# Patient Record
Sex: FEMALE | Race: WHITE | NOT HISPANIC OR LATINO | URBAN - METROPOLITAN AREA
[De-identification: names, ages, dates, MRNs, and addresses within clinical notes are randomized per-mention and may not be internally consistent; named-entity substitution may affect disease eponyms.]

---

## 2018-01-03 ENCOUNTER — INPATIENT (INPATIENT)
Facility: HOSPITAL | Age: 83
LOS: 9 days | Discharge: HOME | DRG: 25 | End: 2018-01-13
Attending: NEUROLOGICAL SURGERY | Admitting: NEUROLOGICAL SURGERY
Payer: COMMERCIAL

## 2018-01-03 VITALS
RESPIRATION RATE: 18 BRPM | HEART RATE: 70 BPM | SYSTOLIC BLOOD PRESSURE: 167 MMHG | OXYGEN SATURATION: 95 % | DIASTOLIC BLOOD PRESSURE: 75 MMHG | TEMPERATURE: 98 F

## 2018-01-03 DIAGNOSIS — I62.00 NONTRAUMATIC SUBDURAL HEMORRHAGE, UNSPECIFIED: ICD-10-CM

## 2018-01-03 LAB
ALBUMIN SERPL ELPH-MCNC: 3.9 G/DL — SIGNIFICANT CHANGE UP (ref 3.3–5)
ALP SERPL-CCNC: 116 U/L — SIGNIFICANT CHANGE UP (ref 40–120)
ALT FLD-CCNC: 10 U/L RC — SIGNIFICANT CHANGE UP (ref 10–45)
ANION GAP SERPL CALC-SCNC: 12 MMOL/L — SIGNIFICANT CHANGE UP (ref 5–17)
APTT BLD: 32.2 SEC — SIGNIFICANT CHANGE UP (ref 27.5–37.4)
AST SERPL-CCNC: 15 U/L — SIGNIFICANT CHANGE UP (ref 10–40)
BASOPHILS # BLD AUTO: 0 K/UL — SIGNIFICANT CHANGE UP (ref 0–0.2)
BASOPHILS NFR BLD AUTO: 0.4 % — SIGNIFICANT CHANGE UP (ref 0–2)
BILIRUB SERPL-MCNC: 0.6 MG/DL — SIGNIFICANT CHANGE UP (ref 0.2–1.2)
BLD GP AB SCN SERPL QL: NEGATIVE — SIGNIFICANT CHANGE UP
BUN SERPL-MCNC: 12 MG/DL — SIGNIFICANT CHANGE UP (ref 7–23)
CALCIUM SERPL-MCNC: 10 MG/DL — SIGNIFICANT CHANGE UP (ref 8.4–10.5)
CHLORIDE SERPL-SCNC: 100 MMOL/L — SIGNIFICANT CHANGE UP (ref 96–108)
CO2 SERPL-SCNC: 26 MMOL/L — SIGNIFICANT CHANGE UP (ref 22–31)
CREAT SERPL-MCNC: 0.75 MG/DL — SIGNIFICANT CHANGE UP (ref 0.5–1.3)
EOSINOPHIL # BLD AUTO: 0.1 K/UL — SIGNIFICANT CHANGE UP (ref 0–0.5)
EOSINOPHIL NFR BLD AUTO: 2.5 % — SIGNIFICANT CHANGE UP (ref 0–6)
GLUCOSE SERPL-MCNC: 228 MG/DL — HIGH (ref 70–99)
HCT VFR BLD CALC: 41.8 % — SIGNIFICANT CHANGE UP (ref 34.5–45)
HGB BLD-MCNC: 14 G/DL — SIGNIFICANT CHANGE UP (ref 11.5–15.5)
INR BLD: 1.05 RATIO — SIGNIFICANT CHANGE UP (ref 0.88–1.16)
LYMPHOCYTES # BLD AUTO: 1.6 K/UL — SIGNIFICANT CHANGE UP (ref 1–3.3)
LYMPHOCYTES # BLD AUTO: 33.8 % — SIGNIFICANT CHANGE UP (ref 13–44)
MCHC RBC-ENTMCNC: 31 PG — SIGNIFICANT CHANGE UP (ref 27–34)
MCHC RBC-ENTMCNC: 33.6 GM/DL — SIGNIFICANT CHANGE UP (ref 32–36)
MCV RBC AUTO: 92.4 FL — SIGNIFICANT CHANGE UP (ref 80–100)
MONOCYTES # BLD AUTO: 0.5 K/UL — SIGNIFICANT CHANGE UP (ref 0–0.9)
MONOCYTES NFR BLD AUTO: 10.6 % — SIGNIFICANT CHANGE UP (ref 2–14)
NEUTROPHILS # BLD AUTO: 2.6 K/UL — SIGNIFICANT CHANGE UP (ref 1.8–7.4)
NEUTROPHILS NFR BLD AUTO: 52.8 % — SIGNIFICANT CHANGE UP (ref 43–77)
PLATELET # BLD AUTO: 178 K/UL — SIGNIFICANT CHANGE UP (ref 150–400)
POTASSIUM SERPL-MCNC: 3.8 MMOL/L — SIGNIFICANT CHANGE UP (ref 3.5–5.3)
POTASSIUM SERPL-SCNC: 3.8 MMOL/L — SIGNIFICANT CHANGE UP (ref 3.5–5.3)
PROT SERPL-MCNC: 8 G/DL — SIGNIFICANT CHANGE UP (ref 6–8.3)
PROTHROM AB SERPL-ACNC: 11.4 SEC — SIGNIFICANT CHANGE UP (ref 9.8–12.7)
RBC # BLD: 4.53 M/UL — SIGNIFICANT CHANGE UP (ref 3.8–5.2)
RBC # FLD: 13.9 % — SIGNIFICANT CHANGE UP (ref 10.3–14.5)
RH IG SCN BLD-IMP: POSITIVE — SIGNIFICANT CHANGE UP
SODIUM SERPL-SCNC: 138 MMOL/L — SIGNIFICANT CHANGE UP (ref 135–145)
WBC # BLD: 4.8 K/UL — SIGNIFICANT CHANGE UP (ref 3.8–10.5)
WBC # FLD AUTO: 4.8 K/UL — SIGNIFICANT CHANGE UP (ref 3.8–10.5)

## 2018-01-03 PROCEDURE — 99285 EMERGENCY DEPT VISIT HI MDM: CPT

## 2018-01-03 PROCEDURE — 71045 X-RAY EXAM CHEST 1 VIEW: CPT | Mod: 26

## 2018-01-03 PROCEDURE — 70450 CT HEAD/BRAIN W/O DYE: CPT | Mod: 26

## 2018-01-03 RX ORDER — LACOSAMIDE 50 MG/1
250 TABLET ORAL EVERY 12 HOURS
Qty: 0 | Refills: 0 | Status: DISCONTINUED | OUTPATIENT
Start: 2018-01-03 | End: 2018-01-03

## 2018-01-03 RX ORDER — DESMOPRESSIN ACETATE 0.1 MG/1
0.2 TABLET ORAL ONCE
Qty: 0 | Refills: 0 | Status: COMPLETED | OUTPATIENT
Start: 2018-01-03 | End: 2018-01-03

## 2018-01-03 RX ORDER — LACOSAMIDE 50 MG/1
250 TABLET ORAL EVERY 12 HOURS
Qty: 0 | Refills: 0 | Status: DISCONTINUED | OUTPATIENT
Start: 2018-01-03 | End: 2018-01-04

## 2018-01-03 RX ORDER — DEXTROSE MONOHYDRATE, SODIUM CHLORIDE, AND POTASSIUM CHLORIDE 50; .745; 4.5 G/1000ML; G/1000ML; G/1000ML
1000 INJECTION, SOLUTION INTRAVENOUS
Qty: 0 | Refills: 0 | Status: DISCONTINUED | OUTPATIENT
Start: 2018-01-03 | End: 2018-01-03

## 2018-01-03 RX ORDER — SENNA PLUS 8.6 MG/1
2 TABLET ORAL AT BEDTIME
Qty: 0 | Refills: 0 | Status: DISCONTINUED | OUTPATIENT
Start: 2018-01-03 | End: 2018-01-04

## 2018-01-03 RX ORDER — INSULIN LISPRO 100/ML
VIAL (ML) SUBCUTANEOUS EVERY 6 HOURS
Qty: 0 | Refills: 0 | Status: DISCONTINUED | OUTPATIENT
Start: 2018-01-03 | End: 2018-01-04

## 2018-01-03 RX ORDER — DOCUSATE SODIUM 100 MG
100 CAPSULE ORAL DAILY
Qty: 0 | Refills: 0 | Status: DISCONTINUED | OUTPATIENT
Start: 2018-01-03 | End: 2018-01-04

## 2018-01-03 RX ADMIN — DESMOPRESSIN ACETATE 0.2 MILLIGRAM(S): 0.1 TABLET ORAL at 22:21

## 2018-01-03 NOTE — ED PROVIDER NOTE - NS ED ROS FT
No fever, no chills, no change in vision, no chest pain, no SOB, no cough, no abdominal pain, no nausea, no vomiting, no joint pain, no rashes, bl le weakness, no other focal neurologic complaints, no psychiatric issues, otherwise as HPI or negative

## 2018-01-03 NOTE — ED PROVIDER NOTE - PHYSICAL EXAMINATION
NAD, NCAT, MMM, Trachea midline, No midline spine  ttp throughout.  PERRL b/l, CTAB, Non-tachy, Normal perfusion, soft, NTND, No edema, No deformity of extremities,  CN grossly intact,, No focal motor or sensory deficits.

## 2018-01-03 NOTE — H&P ADULT - PMH
Atrial fibrillation  on aspirin (last taken 1/2/2018)  Cervical cancer  Stage I per the daughter s/p hysterectomy april 2000 (complicated by: adhesions / pna)  Diabetes mellitus    Endocarditis  2010 (treated w/ antibiotics in Q)  Hypertension

## 2018-01-03 NOTE — ED PROVIDER NOTE - OBJECTIVE STATEMENT
89F hx afib on asa and metoprolol transferred from [facility] for SDH right sided 2cm with 1.2cm rightward shift and mass effect.  Neuro and HD stable.  Pt initially c/b bl le weakness.  CTH and C-spine performed with above findings.

## 2018-01-03 NOTE — H&P ADULT - NSHPPHYSICALEXAM_GEN_ALL_CORE
Neuro: Awake, alert, oriented to self, place and month, pupils 2mm reactive bilaterally, following commands, moving all extremities x 4, no drift, lower extermities 4/5 secondary to effort  Cardiac: RRR on telemetry  Respiratory: No wheezing, no rhonchi  GI: abd soft, non-tender, mildly distended, positive bowel sounds  Skin: Intact Neuro: Awake, alert, oriented to self, place and month, pupils 2mm reactive bilaterally (rt pupil is surgical), following commands, moving all extremities x 4, no drift, upper/lower extermities 4/5 secondary to effort  Cardiac: RRR on telemetry  Respiratory: No wheezing, no rhonchi  GI: abd soft, non-tender, mildly distended, positive bowel sounds  Skin: Intact

## 2018-01-03 NOTE — ED PROVIDER NOTE - ATTENDING CONTRIBUTION TO CARE
89F hx afib on asa and metoprolol transferred from [facility] for SDH rigth sided 2cm with 1.2cm rightward shift and mass effect.  Neuro and HD stable.  Pt initially c/b bl le weakness. 89F hx afib on asa and metoprolol transferred from [facility] for SDH right sided 2cm with 1.2cm rightward shift and mass effect.  Neuro and HD stable.  Pt initially c/b bl le weakness.  CTH and C-spine performed with above findings.  VSS.  Primary survey intact.  GCS 15  (Attending - Adele) NAD, NCAT, MMM, Trachea midline, No midline cervical tpp throughout.  PERRL b/l, CTAB, Non-tachy, Normal perfusion, soft, NTND, No edema, No deformity of extremities,  CN grossly intact,, No focal motor or sensory deficits.     NSx consulted, wll give DDAVP for asa use, CXR, repeat CTH, ekg, reassess 89F hx afib on asa and metoprolol transferred from [facility] for SDH right sided 2cm with 1.2cm rightward shift and mass effect.  Neuro and HD stable.  Pt initially c/b bl le weakness.  CTH and C-spine performed with above findings.  VSS.  Primary survey intact.  GCS 15  (Attending - Adele) NAD, NCAT, MMM, Trachea midline, No midline cervical ttp throughout.  PERRL b/l, CTAB, Non-tachy, Normal perfusion, soft, NTND, No edema, No deformity of extremities,  CN grossly intact,, No focal motor or sensory deficits.     NSx consulted, wll give DDAVP for asa use, CXR, repeat CTH, ekg, reassess

## 2018-01-03 NOTE — ED PROVIDER NOTE - MEDICAL DECISION MAKING DETAILS
89F hx afib on asa and metoprolol transferred from [facility] for SDH right sided 2cm with 1.2cm rightward shift and mass effect.  Neuro and HD stable.  Pt initially c/b bl le weakness.  CTH and C-spine performed with above findings.  VSS.  Primary survey intact.  GCS 15  (Attending - Adele) NAD, NCAT, MMM, Trachea midline, No midline cervical tpp throughout.  PERRL b/l, CTAB, Non-tachy, Normal perfusion, soft, NTND, No edema, No deformity of extremities,  CN grossly intact,, No focal motor or sensory deficits.     NSx consulted, wll give DDAVP for asa use, CXR, repeat CTH, ekg, reassess
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
## 2018-01-03 NOTE — H&P ADULT - PSH
H/O: hysterectomy    History of tracheostomy  april 2000 as a complication from her hysterectomy - developed pna and was unable to be weaned off the vent

## 2018-01-03 NOTE — H&P ADULT - PROBLEM SELECTOR PLAN 1
1. CT Head stat  2. Admit to NSCU  3. DDAVP / Platelets  4. Keep NPO for pre-op for burrhole in am  5. Pre-op labs  6. Anti-epileptics

## 2018-01-04 DIAGNOSIS — Z90.710 ACQUIRED ABSENCE OF BOTH CERVIX AND UTERUS: Chronic | ICD-10-CM

## 2018-01-04 DIAGNOSIS — Z98.890 OTHER SPECIFIED POSTPROCEDURAL STATES: Chronic | ICD-10-CM

## 2018-01-04 DIAGNOSIS — I62.00 NONTRAUMATIC SUBDURAL HEMORRHAGE, UNSPECIFIED: ICD-10-CM

## 2018-01-04 LAB
ANION GAP SERPL CALC-SCNC: 12 MMOL/L — SIGNIFICANT CHANGE UP (ref 5–17)
ANION GAP SERPL CALC-SCNC: 14 MMOL/L — SIGNIFICANT CHANGE UP (ref 5–17)
BUN SERPL-MCNC: 10 MG/DL — SIGNIFICANT CHANGE UP (ref 7–23)
BUN SERPL-MCNC: 11 MG/DL — SIGNIFICANT CHANGE UP (ref 7–23)
CALCIUM SERPL-MCNC: 9 MG/DL — SIGNIFICANT CHANGE UP (ref 8.4–10.5)
CALCIUM SERPL-MCNC: 9.9 MG/DL — SIGNIFICANT CHANGE UP (ref 8.4–10.5)
CHLORIDE SERPL-SCNC: 102 MMOL/L — SIGNIFICANT CHANGE UP (ref 96–108)
CHLORIDE SERPL-SCNC: 107 MMOL/L — SIGNIFICANT CHANGE UP (ref 96–108)
CHOLEST SERPL-MCNC: 126 MG/DL — SIGNIFICANT CHANGE UP (ref 10–199)
CK MB CFR SERPL CALC: 2.4 NG/ML — SIGNIFICANT CHANGE UP (ref 0–3.8)
CK SERPL-CCNC: 60 U/L — SIGNIFICANT CHANGE UP (ref 25–170)
CO2 SERPL-SCNC: 24 MMOL/L — SIGNIFICANT CHANGE UP (ref 22–31)
CO2 SERPL-SCNC: 25 MMOL/L — SIGNIFICANT CHANGE UP (ref 22–31)
CREAT SERPL-MCNC: 0.62 MG/DL — SIGNIFICANT CHANGE UP (ref 0.5–1.3)
CREAT SERPL-MCNC: 0.87 MG/DL — SIGNIFICANT CHANGE UP (ref 0.5–1.3)
GLUCOSE BLDC GLUCOMTR-MCNC: 195 MG/DL — HIGH (ref 70–99)
GLUCOSE BLDC GLUCOMTR-MCNC: 210 MG/DL — HIGH (ref 70–99)
GLUCOSE BLDC GLUCOMTR-MCNC: 215 MG/DL — HIGH (ref 70–99)
GLUCOSE BLDC GLUCOMTR-MCNC: 87 MG/DL — SIGNIFICANT CHANGE UP (ref 70–99)
GLUCOSE SERPL-MCNC: 231 MG/DL — HIGH (ref 70–99)
GLUCOSE SERPL-MCNC: 95 MG/DL — SIGNIFICANT CHANGE UP (ref 70–99)
HBA1C BLD-MCNC: 6.2 % — HIGH (ref 4–5.6)
HCT VFR BLD CALC: 38.2 % — SIGNIFICANT CHANGE UP (ref 34.5–45)
HCT VFR BLD CALC: 39 % — SIGNIFICANT CHANGE UP (ref 34.5–45)
HDLC SERPL-MCNC: 68 MG/DL — SIGNIFICANT CHANGE UP (ref 40–125)
HGB BLD-MCNC: 12.9 G/DL — SIGNIFICANT CHANGE UP (ref 11.5–15.5)
HGB BLD-MCNC: 14.1 G/DL — SIGNIFICANT CHANGE UP (ref 11.5–15.5)
LIPID PNL WITH DIRECT LDL SERPL: 45 MG/DL — SIGNIFICANT CHANGE UP
MAGNESIUM SERPL-MCNC: 2 MG/DL — SIGNIFICANT CHANGE UP (ref 1.6–2.6)
MAGNESIUM SERPL-MCNC: 2.1 MG/DL — SIGNIFICANT CHANGE UP (ref 1.6–2.6)
MCHC RBC-ENTMCNC: 31.4 PG — SIGNIFICANT CHANGE UP (ref 27–34)
MCHC RBC-ENTMCNC: 33.5 PG — SIGNIFICANT CHANGE UP (ref 27–34)
MCHC RBC-ENTMCNC: 33.8 GM/DL — SIGNIFICANT CHANGE UP (ref 32–36)
MCHC RBC-ENTMCNC: 36.2 GM/DL — HIGH (ref 32–36)
MCV RBC AUTO: 92.6 FL — SIGNIFICANT CHANGE UP (ref 80–100)
MCV RBC AUTO: 92.8 FL — SIGNIFICANT CHANGE UP (ref 80–100)
PHOSPHATE SERPL-MCNC: 2.4 MG/DL — LOW (ref 2.5–4.5)
PHOSPHATE SERPL-MCNC: 2.8 MG/DL — SIGNIFICANT CHANGE UP (ref 2.5–4.5)
PLATELET # BLD AUTO: 171 K/UL — SIGNIFICANT CHANGE UP (ref 150–400)
PLATELET # BLD AUTO: 192 K/UL — SIGNIFICANT CHANGE UP (ref 150–400)
POTASSIUM SERPL-MCNC: 3.2 MMOL/L — LOW (ref 3.5–5.3)
POTASSIUM SERPL-MCNC: 3.7 MMOL/L — SIGNIFICANT CHANGE UP (ref 3.5–5.3)
POTASSIUM SERPL-SCNC: 3.2 MMOL/L — LOW (ref 3.5–5.3)
POTASSIUM SERPL-SCNC: 3.7 MMOL/L — SIGNIFICANT CHANGE UP (ref 3.5–5.3)
RBC # BLD: 4.12 M/UL — SIGNIFICANT CHANGE UP (ref 3.8–5.2)
RBC # BLD: 4.21 M/UL — SIGNIFICANT CHANGE UP (ref 3.8–5.2)
RBC # FLD: 13.6 % — SIGNIFICANT CHANGE UP (ref 10.3–14.5)
RBC # FLD: 14 % — SIGNIFICANT CHANGE UP (ref 10.3–14.5)
RH IG SCN BLD-IMP: POSITIVE — SIGNIFICANT CHANGE UP
SODIUM SERPL-SCNC: 140 MMOL/L — SIGNIFICANT CHANGE UP (ref 135–145)
SODIUM SERPL-SCNC: 144 MMOL/L — SIGNIFICANT CHANGE UP (ref 135–145)
TOTAL CHOLESTEROL/HDL RATIO MEASUREMENT: 1.9 RATIO — LOW (ref 3.3–7.1)
TRIGL SERPL-MCNC: 64 MG/DL — SIGNIFICANT CHANGE UP (ref 10–149)
TROPONIN T SERPL-MCNC: <0.01 NG/ML — SIGNIFICANT CHANGE UP (ref 0–0.06)
WBC # BLD: 4.4 K/UL — SIGNIFICANT CHANGE UP (ref 3.8–10.5)
WBC # BLD: 9.3 K/UL — SIGNIFICANT CHANGE UP (ref 3.8–10.5)
WBC # FLD AUTO: 4.4 K/UL — SIGNIFICANT CHANGE UP (ref 3.8–10.5)
WBC # FLD AUTO: 9.3 K/UL — SIGNIFICANT CHANGE UP (ref 3.8–10.5)

## 2018-01-04 PROCEDURE — 61312 CRNEC/CRNOT STTL XDRL/SDRL: CPT

## 2018-01-04 PROCEDURE — 99291 CRITICAL CARE FIRST HOUR: CPT

## 2018-01-04 PROCEDURE — 93010 ELECTROCARDIOGRAM REPORT: CPT | Mod: 76

## 2018-01-04 PROCEDURE — 99292 CRITICAL CARE ADDL 30 MIN: CPT

## 2018-01-04 RX ORDER — INSULIN LISPRO 100/ML
VIAL (ML) SUBCUTANEOUS EVERY 6 HOURS
Qty: 0 | Refills: 0 | Status: DISCONTINUED | OUTPATIENT
Start: 2018-01-04 | End: 2018-01-04

## 2018-01-04 RX ORDER — SENNA PLUS 8.6 MG/1
2 TABLET ORAL AT BEDTIME
Qty: 0 | Refills: 0 | Status: DISCONTINUED | OUTPATIENT
Start: 2018-01-04 | End: 2018-01-13

## 2018-01-04 RX ORDER — PIPERACILLIN AND TAZOBACTAM 4; .5 G/20ML; G/20ML
3.38 INJECTION, POWDER, LYOPHILIZED, FOR SOLUTION INTRAVENOUS EVERY 8 HOURS
Qty: 0 | Refills: 0 | Status: DISCONTINUED | OUTPATIENT
Start: 2018-01-04 | End: 2018-01-05

## 2018-01-04 RX ORDER — PIPERACILLIN AND TAZOBACTAM 4; .5 G/20ML; G/20ML
3.38 INJECTION, POWDER, LYOPHILIZED, FOR SOLUTION INTRAVENOUS EVERY 8 HOURS
Qty: 0 | Refills: 0 | Status: DISCONTINUED | OUTPATIENT
Start: 2018-01-04 | End: 2018-01-04

## 2018-01-04 RX ORDER — METOPROLOL TARTRATE 50 MG
50 TABLET ORAL DAILY
Qty: 0 | Refills: 0 | Status: DISCONTINUED | OUTPATIENT
Start: 2018-01-04 | End: 2018-01-04

## 2018-01-04 RX ORDER — DOCUSATE SODIUM 100 MG
100 CAPSULE ORAL DAILY
Qty: 0 | Refills: 0 | Status: DISCONTINUED | OUTPATIENT
Start: 2018-01-04 | End: 2018-01-13

## 2018-01-04 RX ORDER — VANCOMYCIN HCL 1 G
500 VIAL (EA) INTRAVENOUS EVERY 12 HOURS
Qty: 0 | Refills: 0 | Status: DISCONTINUED | OUTPATIENT
Start: 2018-01-04 | End: 2018-01-04

## 2018-01-04 RX ORDER — AMLODIPINE BESYLATE 2.5 MG/1
10 TABLET ORAL DAILY
Qty: 0 | Refills: 0 | Status: DISCONTINUED | OUTPATIENT
Start: 2018-01-04 | End: 2018-01-04

## 2018-01-04 RX ORDER — PANTOPRAZOLE SODIUM 20 MG/1
40 TABLET, DELAYED RELEASE ORAL
Qty: 0 | Refills: 0 | Status: DISCONTINUED | OUTPATIENT
Start: 2018-01-04 | End: 2018-01-09

## 2018-01-04 RX ORDER — LABETALOL HCL 100 MG
10 TABLET ORAL ONCE
Qty: 0 | Refills: 0 | Status: COMPLETED | OUTPATIENT
Start: 2018-01-04 | End: 2018-01-04

## 2018-01-04 RX ORDER — HYDRALAZINE HCL 50 MG
5 TABLET ORAL ONCE
Qty: 0 | Refills: 0 | Status: COMPLETED | OUTPATIENT
Start: 2018-01-04 | End: 2018-01-04

## 2018-01-04 RX ORDER — INSULIN LISPRO 100/ML
VIAL (ML) SUBCUTANEOUS
Qty: 0 | Refills: 0 | Status: DISCONTINUED | OUTPATIENT
Start: 2018-01-04 | End: 2018-01-13

## 2018-01-04 RX ORDER — ACETAMINOPHEN 500 MG
1000 TABLET ORAL ONCE
Qty: 0 | Refills: 0 | Status: COMPLETED | OUTPATIENT
Start: 2018-01-04 | End: 2018-01-04

## 2018-01-04 RX ORDER — FUROSEMIDE 40 MG
10 TABLET ORAL ONCE
Qty: 0 | Refills: 0 | Status: COMPLETED | OUTPATIENT
Start: 2018-01-04 | End: 2018-01-04

## 2018-01-04 RX ORDER — FUROSEMIDE 40 MG
40 TABLET ORAL
Qty: 0 | Refills: 0 | Status: DISCONTINUED | OUTPATIENT
Start: 2018-01-04 | End: 2018-01-04

## 2018-01-04 RX ORDER — HYDRALAZINE HCL 50 MG
10 TABLET ORAL ONCE
Qty: 0 | Refills: 0 | Status: COMPLETED | OUTPATIENT
Start: 2018-01-04 | End: 2018-01-04

## 2018-01-04 RX ORDER — POTASSIUM CHLORIDE 20 MEQ
10 PACKET (EA) ORAL
Qty: 0 | Refills: 0 | Status: COMPLETED | OUTPATIENT
Start: 2018-01-04 | End: 2018-01-05

## 2018-01-04 RX ORDER — SODIUM CHLORIDE 9 MG/ML
1000 INJECTION INTRAMUSCULAR; INTRAVENOUS; SUBCUTANEOUS
Qty: 0 | Refills: 0 | Status: DISCONTINUED | OUTPATIENT
Start: 2018-01-04 | End: 2018-01-04

## 2018-01-04 RX ORDER — METOPROLOL TARTRATE 50 MG
50 TABLET ORAL DAILY
Qty: 0 | Refills: 0 | Status: DISCONTINUED | OUTPATIENT
Start: 2018-01-04 | End: 2018-01-13

## 2018-01-04 RX ORDER — FUROSEMIDE 40 MG
40 TABLET ORAL
Qty: 0 | Refills: 0 | Status: DISCONTINUED | OUTPATIENT
Start: 2018-01-04 | End: 2018-01-05

## 2018-01-04 RX ORDER — PIPERACILLIN AND TAZOBACTAM 4; .5 G/20ML; G/20ML
3.38 INJECTION, POWDER, LYOPHILIZED, FOR SOLUTION INTRAVENOUS EVERY 12 HOURS
Qty: 0 | Refills: 0 | Status: DISCONTINUED | OUTPATIENT
Start: 2018-01-04 | End: 2018-01-04

## 2018-01-04 RX ORDER — VANCOMYCIN HCL 1 G
500 VIAL (EA) INTRAVENOUS EVERY 12 HOURS
Qty: 0 | Refills: 0 | Status: DISCONTINUED | OUTPATIENT
Start: 2018-01-04 | End: 2018-01-05

## 2018-01-04 RX ORDER — AMLODIPINE BESYLATE 2.5 MG/1
10 TABLET ORAL DAILY
Qty: 0 | Refills: 0 | Status: DISCONTINUED | OUTPATIENT
Start: 2018-01-04 | End: 2018-01-13

## 2018-01-04 RX ORDER — LOSARTAN POTASSIUM 100 MG/1
100 TABLET, FILM COATED ORAL DAILY
Qty: 0 | Refills: 0 | Status: DISCONTINUED | OUTPATIENT
Start: 2018-01-04 | End: 2018-01-13

## 2018-01-04 RX ORDER — LEVETIRACETAM 250 MG/1
500 TABLET, FILM COATED ORAL EVERY 12 HOURS
Qty: 0 | Refills: 0 | Status: DISCONTINUED | OUTPATIENT
Start: 2018-01-04 | End: 2018-01-05

## 2018-01-04 RX ORDER — LOSARTAN POTASSIUM 100 MG/1
100 TABLET, FILM COATED ORAL DAILY
Qty: 0 | Refills: 0 | Status: DISCONTINUED | OUTPATIENT
Start: 2018-01-04 | End: 2018-01-04

## 2018-01-04 RX ORDER — POTASSIUM PHOSPHATE, MONOBASIC POTASSIUM PHOSPHATE, DIBASIC 236; 224 MG/ML; MG/ML
15 INJECTION, SOLUTION INTRAVENOUS ONCE
Qty: 0 | Refills: 0 | Status: COMPLETED | OUTPATIENT
Start: 2018-01-04 | End: 2018-01-04

## 2018-01-04 RX ADMIN — PIPERACILLIN AND TAZOBACTAM 25 GRAM(S): 4; .5 INJECTION, POWDER, LYOPHILIZED, FOR SOLUTION INTRAVENOUS at 13:45

## 2018-01-04 RX ADMIN — Medication 400 MILLIGRAM(S): at 23:00

## 2018-01-04 RX ADMIN — POTASSIUM PHOSPHATE, MONOBASIC POTASSIUM PHOSPHATE, DIBASIC 62.5 MILLIMOLE(S): 236; 224 INJECTION, SOLUTION INTRAVENOUS at 02:17

## 2018-01-04 RX ADMIN — Medication 2: at 18:12

## 2018-01-04 RX ADMIN — Medication 10 MILLIGRAM(S): at 02:21

## 2018-01-04 RX ADMIN — PIPERACILLIN AND TAZOBACTAM 25 GRAM(S): 4; .5 INJECTION, POWDER, LYOPHILIZED, FOR SOLUTION INTRAVENOUS at 23:41

## 2018-01-04 RX ADMIN — Medication 40 MILLIGRAM(S): at 18:17

## 2018-01-04 RX ADMIN — SODIUM CHLORIDE 50 MILLILITER(S): 9 INJECTION INTRAMUSCULAR; INTRAVENOUS; SUBCUTANEOUS at 18:18

## 2018-01-04 RX ADMIN — Medication 4: at 00:49

## 2018-01-04 RX ADMIN — Medication 10 MILLIGRAM(S): at 13:43

## 2018-01-04 RX ADMIN — LOSARTAN POTASSIUM 100 MILLIGRAM(S): 100 TABLET, FILM COATED ORAL at 18:17

## 2018-01-04 RX ADMIN — Medication 100 MILLIGRAM(S): at 22:12

## 2018-01-04 RX ADMIN — Medication 10 MILLIGRAM(S): at 15:00

## 2018-01-04 RX ADMIN — LEVETIRACETAM 400 MILLIGRAM(S): 250 TABLET, FILM COATED ORAL at 17:47

## 2018-01-04 RX ADMIN — Medication 4: at 05:25

## 2018-01-04 RX ADMIN — Medication 5 MILLIGRAM(S): at 03:23

## 2018-01-04 RX ADMIN — PANTOPRAZOLE SODIUM 40 MILLIGRAM(S): 20 TABLET, DELAYED RELEASE ORAL at 17:47

## 2018-01-04 RX ADMIN — Medication 100 MILLIGRAM(S): at 17:46

## 2018-01-04 RX ADMIN — Medication 100 MILLIEQUIVALENT(S): at 23:30

## 2018-01-04 RX ADMIN — SENNA PLUS 2 TABLET(S): 8.6 TABLET ORAL at 22:12

## 2018-01-04 RX ADMIN — LACOSAMIDE 150 MILLIGRAM(S): 50 TABLET ORAL at 05:22

## 2018-01-04 NOTE — PROGRESS NOTE ADULT - ASSESSMENT
ASSESSMENT/PLAN:  Pt with h/o afb on ASA now with acute on subacute left SDH.   Neuro checks q 1hr. CT in am   VPA for sz prophylaxis  DDAVP/platelets  NPO for OR in am      [x] Patient is at high risk of neurologic deterioration/death due to: SDH, brain compression    Time seen:  Time spent: _35__ [x] critical care minutes

## 2018-01-04 NOTE — BRIEF OPERATIVE NOTE - CONDITION POST OP
----- Message from Marilyn K Vermeesch, MD sent at 12/22/2017  7:48 AM EST -----  Please call pt with labs.  Cholesterol panel is elevated, but so is HDL and this protects her heart. Her calcium level is high and I have ordered special labs that she needs to return for, cannot repeat on same blood.  Thyroid is in good range.  All   other labs good.  
Pt notified.  
guarded
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
## 2018-01-04 NOTE — PROGRESS NOTE ADULT - SUBJECTIVE AND OBJECTIVE BOX
Neurosurgery Postop  Patient seen and examined  Awake, alert, oriented to self    Follows commands  Left pupil reactive right surgical  BERGER, effort limited, with RUE at least 4/5   Drain holding suction     -Will continue subdural drain  -CTH in AM   -Trend H/H given coffee ground emesis in the OR
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
## 2018-01-04 NOTE — PROGRESS NOTE ADULT - SUBJECTIVE AND OBJECTIVE BOX
NSCU ATTENDING -- ADDITIONAL PROGRESS NOTE    Nighttime rounds were performed -- please refer to earlier Progress Note for HPI details.    T(C): 36.8 (01-04-18 @ 19:00), Max: 36.9 (01-04-18 @ 04:00)  HR: 86 (01-04-18 @ 22:00) (68 - 86)  BP: 134/57 (01-04-18 @ 22:00) (95/77 - 182/82)  RR: 22 (01-04-18 @ 22:00) (13 - 30)  SpO2: 100% (01-04-18 @ 22:00) (95% - 100%)  Wt(kg): --    Relevant labwork and imaging reviewed.    Patient remains critically ill.    SD drain in, post op day 0 from SDH evacaution.  Hypokalemic 3.2, potassium ordered.  Post-op H/H slightly down but unremarkable otherwise.  CT brain in AM, b/l duplex ordered due to history of malignancy.      Additional 30 minutes of critical care time.

## 2018-01-04 NOTE — BRIEF OPERATIVE NOTE - PROCEDURE
<<-----Click on this checkbox to enter Procedure Craniotomy and evacuation of blood clot  01/04/2018    Active  [id number]
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
## 2018-01-04 NOTE — PATIENT PROFILE ADULT. - TEACHING/LEARNING LEARNING PREFERENCES
skill demonstration/written material/verbal instruction/group instruction/audio/individual instruction
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
## 2018-01-04 NOTE — PROGRESS NOTE ADULT - ASSESSMENT
Summary:     NEURO:  q1h neuro checks  Vimpat for seizure ppx  CT this AM    CARDS:  Afib rate controlled, HTN on home meds  Goal -150  Hold Aspirin    PULM:  sat > 92%    RENAL:  continue lasix 40mg bid; goal net negative    GASTRO:  NPO for OR today.    HEME:  monitor H/H    Follow-up DVT US  DVT prophylaxis: SCDs, hold anticoagulation    ENDO:  blood glucose trending high  ISS, monitor fsg    ID:  afebrile    Code status:  Full code  Disposition:  ICU    This patient was at high risk of neurologic deterioration and/or death due to: herniation    Time spent:  45 minutes Summary:     NEURO: acute on chronic SDH  pre-op for denia hole this morning  Vimpat for seizure ppx      CARDS:  Afib rate controlled, HTN on home meds  Goal -150  Hold Aspirin    PULM:  sat > 92%    RENAL:  continue lasix 40mg bid; goal net negative    GASTRO:  NPO for OR today.    HEME:  monitor H/H    Follow-up DVT US  DVT prophylaxis: SCDs, hold anticoagulation    ENDO:  blood glucose trending high  ISS, monitor fsg    ID:  afebrile    Code status:  Full code  Disposition:  ICU    This patient was at high risk of neurologic deterioration and/or death due to: herniation    Time spent:  45 minutes
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
## 2018-01-05 LAB
GLUCOSE BLDC GLUCOMTR-MCNC: 164 MG/DL — HIGH (ref 70–99)
GLUCOSE BLDC GLUCOMTR-MCNC: 180 MG/DL — HIGH (ref 70–99)
GLUCOSE BLDC GLUCOMTR-MCNC: 243 MG/DL — HIGH (ref 70–99)
GLUCOSE BLDC GLUCOMTR-MCNC: 265 MG/DL — HIGH (ref 70–99)

## 2018-01-05 PROCEDURE — 93306 TTE W/DOPPLER COMPLETE: CPT | Mod: 26

## 2018-01-05 PROCEDURE — 71045 X-RAY EXAM CHEST 1 VIEW: CPT | Mod: 26

## 2018-01-05 PROCEDURE — 70450 CT HEAD/BRAIN W/O DYE: CPT | Mod: 26

## 2018-01-05 PROCEDURE — 99291 CRITICAL CARE FIRST HOUR: CPT

## 2018-01-05 RX ORDER — SODIUM CHLORIDE 9 MG/ML
1000 INJECTION, SOLUTION INTRAVENOUS
Qty: 0 | Refills: 0 | Status: DISCONTINUED | OUTPATIENT
Start: 2018-01-05 | End: 2018-01-13

## 2018-01-05 RX ORDER — INSULIN NPH HUM/REG INSULIN HM 70-30/ML
6 VIAL (ML) SUBCUTANEOUS
Qty: 0 | Refills: 0 | COMMUNITY

## 2018-01-05 RX ORDER — ENOXAPARIN SODIUM 100 MG/ML
40 INJECTION SUBCUTANEOUS
Qty: 0 | Refills: 0 | Status: DISCONTINUED | OUTPATIENT
Start: 2018-01-05 | End: 2018-01-10

## 2018-01-05 RX ORDER — DEXTROSE 50 % IN WATER 50 %
12.5 SYRINGE (ML) INTRAVENOUS ONCE
Qty: 0 | Refills: 0 | Status: DISCONTINUED | OUTPATIENT
Start: 2018-01-05 | End: 2018-01-13

## 2018-01-05 RX ORDER — GLUCAGON INJECTION, SOLUTION 0.5 MG/.1ML
1 INJECTION, SOLUTION SUBCUTANEOUS ONCE
Qty: 0 | Refills: 0 | Status: DISCONTINUED | OUTPATIENT
Start: 2018-01-05 | End: 2018-01-13

## 2018-01-05 RX ORDER — FUROSEMIDE 40 MG
10 TABLET ORAL ONCE
Qty: 0 | Refills: 0 | Status: COMPLETED | OUTPATIENT
Start: 2018-01-05 | End: 2018-01-05

## 2018-01-05 RX ORDER — AMLODIPINE BESYLATE 2.5 MG/1
40 TABLET ORAL
Qty: 0 | Refills: 0 | COMMUNITY

## 2018-01-05 RX ORDER — LACOSAMIDE 50 MG/1
100 TABLET ORAL
Qty: 0 | Refills: 0 | Status: DISCONTINUED | OUTPATIENT
Start: 2018-01-05 | End: 2018-01-07

## 2018-01-05 RX ORDER — FUROSEMIDE 40 MG
20 TABLET ORAL EVERY 12 HOURS
Qty: 0 | Refills: 0 | Status: DISCONTINUED | OUTPATIENT
Start: 2018-01-05 | End: 2018-01-13

## 2018-01-05 RX ORDER — LEVETIRACETAM 250 MG/1
500 TABLET, FILM COATED ORAL
Qty: 0 | Refills: 0 | Status: DISCONTINUED | OUTPATIENT
Start: 2018-01-05 | End: 2018-01-05

## 2018-01-05 RX ORDER — INSULIN GLARGINE 100 [IU]/ML
5 INJECTION, SOLUTION SUBCUTANEOUS AT BEDTIME
Qty: 0 | Refills: 0 | Status: DISCONTINUED | OUTPATIENT
Start: 2018-01-05 | End: 2018-01-06

## 2018-01-05 RX ORDER — INSULIN NPH HUM/REG INSULIN HM 70-30/ML
30 VIAL (ML) SUBCUTANEOUS
Qty: 0 | Refills: 0 | COMMUNITY

## 2018-01-05 RX ORDER — DEXTROSE 50 % IN WATER 50 %
25 SYRINGE (ML) INTRAVENOUS ONCE
Qty: 0 | Refills: 0 | Status: DISCONTINUED | OUTPATIENT
Start: 2018-01-05 | End: 2018-01-13

## 2018-01-05 RX ORDER — DEXTROSE 50 % IN WATER 50 %
1 SYRINGE (ML) INTRAVENOUS ONCE
Qty: 0 | Refills: 0 | Status: DISCONTINUED | OUTPATIENT
Start: 2018-01-05 | End: 2018-01-13

## 2018-01-05 RX ADMIN — LEVETIRACETAM 400 MILLIGRAM(S): 250 TABLET, FILM COATED ORAL at 05:46

## 2018-01-05 RX ADMIN — Medication 100 MILLIEQUIVALENT(S): at 00:40

## 2018-01-05 RX ADMIN — LEVETIRACETAM 500 MILLIGRAM(S): 250 TABLET, FILM COATED ORAL at 18:30

## 2018-01-05 RX ADMIN — LOSARTAN POTASSIUM 100 MILLIGRAM(S): 100 TABLET, FILM COATED ORAL at 05:50

## 2018-01-05 RX ADMIN — PANTOPRAZOLE SODIUM 40 MILLIGRAM(S): 20 TABLET, DELAYED RELEASE ORAL at 17:34

## 2018-01-05 RX ADMIN — Medication 100 MILLIGRAM(S): at 12:14

## 2018-01-05 RX ADMIN — Medication 40 MILLIGRAM(S): at 05:50

## 2018-01-05 RX ADMIN — Medication 2: at 17:34

## 2018-01-05 RX ADMIN — Medication 10 MILLIGRAM(S): at 03:25

## 2018-01-05 RX ADMIN — ENOXAPARIN SODIUM 40 MILLIGRAM(S): 100 INJECTION SUBCUTANEOUS at 17:33

## 2018-01-05 RX ADMIN — Medication 20 MILLIGRAM(S): at 17:34

## 2018-01-05 RX ADMIN — Medication 2: at 22:13

## 2018-01-05 RX ADMIN — PANTOPRAZOLE SODIUM 40 MILLIGRAM(S): 20 TABLET, DELAYED RELEASE ORAL at 06:40

## 2018-01-05 RX ADMIN — AMLODIPINE BESYLATE 10 MILLIGRAM(S): 2.5 TABLET ORAL at 05:50

## 2018-01-05 RX ADMIN — Medication 50 MILLIGRAM(S): at 05:50

## 2018-01-05 RX ADMIN — Medication 6: at 12:14

## 2018-01-05 RX ADMIN — Medication 4: at 09:25

## 2018-01-05 RX ADMIN — PIPERACILLIN AND TAZOBACTAM 25 GRAM(S): 4; .5 INJECTION, POWDER, LYOPHILIZED, FOR SOLUTION INTRAVENOUS at 06:40

## 2018-01-05 RX ADMIN — Medication 1000 MILLIGRAM(S): at 00:00

## 2018-01-05 RX ADMIN — SENNA PLUS 2 TABLET(S): 8.6 TABLET ORAL at 22:13

## 2018-01-05 RX ADMIN — Medication 100 MILLIGRAM(S): at 05:47

## 2018-01-05 RX ADMIN — INSULIN GLARGINE 5 UNIT(S): 100 INJECTION, SOLUTION SUBCUTANEOUS at 22:14

## 2018-01-05 RX ADMIN — Medication 100 MILLIEQUIVALENT(S): at 01:55

## 2018-01-05 NOTE — PROGRESS NOTE ADULT - SUBJECTIVE AND OBJECTIVE BOX
SUBJECTIVE:   Patient feels well overall, some headaches. more interactive with family    OVERNIGHT EVENTS:   drain put out 170 o/n and 50 cc during day     Vital Signs Last 24 Hrs  T(C): 37 (05 Jan 2018 15:00), Max: 37.7 (05 Jan 2018 07:00)  T(F): 98.6 (05 Jan 2018 15:00), Max: 99.9 (05 Jan 2018 07:00)  HR: 82 (05 Jan 2018 18:00) (77 - 91)  BP: 162/64 (05 Jan 2018 18:00) (102/42 - 185/68)  BP(mean): 92 (05 Jan 2018 18:00) (59 - 123)  RR: 22 (05 Jan 2018 18:00) (11 - 36)  SpO2: 100% (05 Jan 2018 18:00) (90% - 100%)    PHYSICAL EXAM:    General: No Acute Distress     Neurological: Awake, alert oriented to person, hospital, and year  Following Commands, left pupil reactive and right surgical, EOMI, Face Symmetrical, Speech Fluent, Moving all extremities, Muscle Strength UE > LE (will bend knees, wiggle toes), No Drift, Sensation to Light Touch Intact    Extremities: No calf tenderness     Incision: dressed     LABS:                        12.9   9.3   )-----------( 192      ( 04 Jan 2018 21:44 )             38.2    01-04    144  |  107  |  11  ----------------------------<  95  3.2<L>   |  25  |  0.87    Ca    9.0      04 Jan 2018 21:44  Phos  2.8     01-04  Mg     2.0     01-04    TPro  8.0  /  Alb  3.9  /  TBili  0.6  /  DBili  x   /  AST  15  /  ALT  10  /  AlkPhos  116  01-03  PT/INR - ( 03 Jan 2018 21:57 )   PT: 11.4 sec;   INR: 1.05 ratio         PTT - ( 03 Jan 2018 21:57 )  PTT:32.2 sec  Hemoglobin A1C, Whole Blood: 6.2 % (01-04 @ 07:22)      01-04 @ 07:01  -  01-05 @ 07:00  --------------------------------------------------------  IN: 1450 mL / OUT: 1045 mL / NET: 405 mL    01-05 @ 07:01  - 01-05 @ 18:44  --------------------------------------------------------  IN: 980 mL / OUT: 1125 mL / NET: -145 mL      DRAINS:     MEDICATIONS:  Antibiotics:    Neuro:  levETIRAcetam 500 milliGRAM(s) Oral two times a day    Cardiac:  amLODIPine   Tablet 10 milliGRAM(s) Oral daily  furosemide    Tablet 20 milliGRAM(s) Oral every 12 hours  losartan 100 milliGRAM(s) Oral daily  metoprolol succinate ER 50 milliGRAM(s) Oral daily    Pulm:    GI/:  docusate sodium 100 milliGRAM(s) Oral daily  pantoprazole  Injectable 40 milliGRAM(s) IV Push two times a day  senna 2 Tablet(s) Oral at bedtime    Other:   dextrose 5%. 1000 milliLiter(s) IV Continuous <Continuous>  dextrose 50% Injectable 12.5 Gram(s) IV Push once  dextrose 50% Injectable 25 Gram(s) IV Push once  dextrose 50% Injectable 25 Gram(s) IV Push once  dextrose Gel 1 Dose(s) Oral once PRN Blood Glucose LESS THAN 70 milliGRAM(s)/deciliter  enoxaparin Injectable 40 milliGRAM(s) SubCutaneous <User Schedule>  glucagon  Injectable 1 milliGRAM(s) IntraMuscular once PRN Glucose LESS THAN 70 milligrams/deciliter  insulin glargine Injectable (LANTUS) 5 Unit(s) SubCutaneous at bedtime  insulin lispro (HumaLOG) corrective regimen sliding scale   SubCutaneous Before meals and at bedtime    DIET: [] Regular [] CCD [] Renal [] Puree [] Dysphagia [] Tube Feeds:     IMAGING:   CTH shows decreased SDH and good placement of drain. Decreased shift.

## 2018-01-05 NOTE — DIETITIAN INITIAL EVALUATION ADULT. - ADHERENCE
per patient's son at bedside, patient "eats everything", unclear if patient followed any therapeutic diet PTA

## 2018-01-05 NOTE — DIETITIAN INITIAL EVALUATION ADULT. - OTHER INFO
Patient seen for initial nutrition assessment. Per patient's son at bedside, patient with no food allergies and was tolerating regular food PTA. No reports of GI distress, BM 1//4 and 1/5 per documentation. Patient's son reports patient took insulin PTA, but unsure of what type of insulin or typical blood glucose level readings. Patient consumed 100% of breakfast tray.
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
## 2018-01-05 NOTE — DIETITIAN INITIAL EVALUATION ADULT. - NS AS NUTRI INTERV MEALS SNACK
encourage PO Intake, RD to provide patient food preferences when requested, recommend changing to consistent CHO with evening snack

## 2018-01-05 NOTE — PROGRESS NOTE ADULT - ASSESSMENT
· Assessment		  Summary: 89y F with acute on chronic SDH now POD 1 s/p left craniotomy    NEURO: acute on chronic SDH   Keppra for seizure ppx  CT this AM    CARDS:  Afib rate controlled, HTN on home meds  Goal -150  Hold Aspirin    PULM:  sat > 92%    RENAL:  continue lasix 40mg bid; goal net negative    GASTRO:  continue protonix bid for possible coffee ground emesis yesterday  DASH diet    HEME:  monitor H/H    Follow-up DVT US  DVT prophylaxis: SCDs, lovenox    ENDO:  ISS, monitor fsg    ID:  afebrile; on vanc/zosyn    Code status:  Full code  Disposition:  ICU    This patient was at high risk of neurologic deterioration and/or death due to: herniation    Time spent:  45 minutes

## 2018-01-05 NOTE — PROGRESS NOTE ADULT - ASSESSMENT
Postop day 1 from craniotomy for loculated cSDH, doing well  -continue AEDs  -continue subdural drain, consider d/cing in AM depending on drain output
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
## 2018-01-05 NOTE — DIETITIAN INITIAL EVALUATION ADULT. - ENERGY NEEDS
height: 5'2" weight: 159 pounds BMI: 29 IBW: 110 pounds (+/-10%)  pertinent info: patient admitted with chronic subdural hematoma, s/p craniotomy for evacuation  skin: no edema, no pressure injuries per documentation

## 2018-01-05 NOTE — PROGRESS NOTE ADULT - ASSESSMENT
89F POD 1 s/p L craniotomy for SDH.   - CTH in AM  - Monitor drain output   - q. 1 neuro checks   - BLE dopplers pending

## 2018-01-05 NOTE — PROGRESS NOTE ADULT - SUBJECTIVE AND OBJECTIVE BOX
Patient Seen and Examined.     Overnight Events:   None    T(C): 37 (01-05-18 @ 04:00), Max: 37 (01-05-18 @ 04:00)  HR: 88 (01-05-18 @ 04:00) (68 - 88)  BP: 113/46 (01-05-18 @ 04:00) (95/77 - 175/64)  RR: 26 (01-05-18 @ 04:00) (13 - 36)  SpO2: 99% (01-05-18 @ 04:00) (97% - 100%)    Exam:   Awake, Alert, AOX3  PERRL, EOMI, Face equal, Tongue m/l  5/5 throughout, no drift  SILT    amLODIPine   Tablet 10 milliGRAM(s) Oral daily  docusate sodium 100 milliGRAM(s) Oral daily  furosemide    Tablet 40 milliGRAM(s) Oral two times a day  furosemide   Injectable 10 milliGRAM(s) IntraMuscular once  insulin lispro (HumaLOG) corrective regimen sliding scale   SubCutaneous Before meals and at bedtime  levETIRAcetam  IVPB 500 milliGRAM(s) IV Intermittent every 12 hours  losartan 100 milliGRAM(s) Oral daily  metoprolol succinate ER 50 milliGRAM(s) Oral daily  pantoprazole  Injectable 40 milliGRAM(s) IV Push two times a day  piperacillin/tazobactam IVPB. 3.375 Gram(s) IV Intermittent every 8 hours  senna 2 Tablet(s) Oral at bedtime  vancomycin  IVPB 500 milliGRAM(s) IV Intermittent every 12 hours                            12.9   9.3   )-----------( 192      ( 04 Jan 2018 21:44 )             38.2     01-04    144  |  107  |  11  ----------------------------<  95  3.2<L>   |  25  |  0.87    Ca    9.0      04 Jan 2018 21:44  Phos  2.8     01-04  Mg     2.0     01-04    TPro  8.0  /  Alb  3.9  /  TBili  0.6  /  DBili  x   /  AST  15  /  ALT  10  /  AlkPhos  116  01-03    PT/INR - ( 03 Jan 2018 21:57 )   PT: 11.4 sec;   INR: 1.05 ratio         PTT - ( 03 Jan 2018 21:57 )  PTT:32.2 sec    Imaging:   < from: CT Head No Cont (01.03.18 @ 22:54) >  IMPRESSION:   Acute/subacute on chronic left holohemispheric subdural hemorrhage with   1.2 cm midline shift to the right. Tiny right parietal convexity subacute   subdural hemorrhage. No parenchymal hemorrhage or evidence of acute   territorial infarct.    < end of copied text >

## 2018-01-06 LAB
ANION GAP SERPL CALC-SCNC: 12 MMOL/L — SIGNIFICANT CHANGE UP (ref 5–17)
BUN SERPL-MCNC: 10 MG/DL — SIGNIFICANT CHANGE UP (ref 7–23)
CALCIUM SERPL-MCNC: 8.9 MG/DL — SIGNIFICANT CHANGE UP (ref 8.4–10.5)
CHLORIDE SERPL-SCNC: 101 MMOL/L — SIGNIFICANT CHANGE UP (ref 96–108)
CO2 SERPL-SCNC: 26 MMOL/L — SIGNIFICANT CHANGE UP (ref 22–31)
CREAT SERPL-MCNC: 0.77 MG/DL — SIGNIFICANT CHANGE UP (ref 0.5–1.3)
GLUCOSE BLDC GLUCOMTR-MCNC: 156 MG/DL — HIGH (ref 70–99)
GLUCOSE BLDC GLUCOMTR-MCNC: 174 MG/DL — HIGH (ref 70–99)
GLUCOSE BLDC GLUCOMTR-MCNC: 182 MG/DL — HIGH (ref 70–99)
GLUCOSE BLDC GLUCOMTR-MCNC: 254 MG/DL — HIGH (ref 70–99)
GLUCOSE SERPL-MCNC: 171 MG/DL — HIGH (ref 70–99)
HCT VFR BLD CALC: 35.1 % — SIGNIFICANT CHANGE UP (ref 34.5–45)
HGB BLD-MCNC: 12.1 G/DL — SIGNIFICANT CHANGE UP (ref 11.5–15.5)
MAGNESIUM SERPL-MCNC: 1.8 MG/DL — SIGNIFICANT CHANGE UP (ref 1.6–2.6)
MCHC RBC-ENTMCNC: 32.3 PG — SIGNIFICANT CHANGE UP (ref 27–34)
MCHC RBC-ENTMCNC: 34.5 GM/DL — SIGNIFICANT CHANGE UP (ref 32–36)
MCV RBC AUTO: 93.5 FL — SIGNIFICANT CHANGE UP (ref 80–100)
PHOSPHATE SERPL-MCNC: 2.2 MG/DL — LOW (ref 2.5–4.5)
PLATELET # BLD AUTO: 173 K/UL — SIGNIFICANT CHANGE UP (ref 150–400)
POTASSIUM SERPL-MCNC: 3.3 MMOL/L — LOW (ref 3.5–5.3)
POTASSIUM SERPL-SCNC: 3.3 MMOL/L — LOW (ref 3.5–5.3)
RBC # BLD: 3.76 M/UL — LOW (ref 3.8–5.2)
RBC # FLD: 13.9 % — SIGNIFICANT CHANGE UP (ref 10.3–14.5)
SODIUM SERPL-SCNC: 139 MMOL/L — SIGNIFICANT CHANGE UP (ref 135–145)
WBC # BLD: 9 K/UL — SIGNIFICANT CHANGE UP (ref 3.8–10.5)
WBC # FLD AUTO: 9 K/UL — SIGNIFICANT CHANGE UP (ref 3.8–10.5)

## 2018-01-06 PROCEDURE — 70450 CT HEAD/BRAIN W/O DYE: CPT | Mod: 26

## 2018-01-06 PROCEDURE — 99232 SBSQ HOSP IP/OBS MODERATE 35: CPT

## 2018-01-06 RX ORDER — ACETAMINOPHEN 500 MG
1000 TABLET ORAL ONCE
Qty: 0 | Refills: 0 | Status: COMPLETED | OUTPATIENT
Start: 2018-01-06 | End: 2018-01-06

## 2018-01-06 RX ORDER — POTASSIUM CHLORIDE 20 MEQ
20 PACKET (EA) ORAL
Qty: 0 | Refills: 0 | Status: COMPLETED | OUTPATIENT
Start: 2018-01-06 | End: 2018-01-06

## 2018-01-06 RX ORDER — LABETALOL HCL 100 MG
10 TABLET ORAL ONCE
Qty: 0 | Refills: 0 | Status: COMPLETED | OUTPATIENT
Start: 2018-01-06 | End: 2018-01-06

## 2018-01-06 RX ORDER — INSULIN NPH HUM/REG INSULIN HM 70-30/ML
25 VIAL (ML) SUBCUTANEOUS
Qty: 0 | Refills: 0 | Status: DISCONTINUED | OUTPATIENT
Start: 2018-01-06 | End: 2018-01-09

## 2018-01-06 RX ORDER — POTASSIUM PHOSPHATE, MONOBASIC POTASSIUM PHOSPHATE, DIBASIC 236; 224 MG/ML; MG/ML
15 INJECTION, SOLUTION INTRAVENOUS ONCE
Qty: 0 | Refills: 0 | Status: COMPLETED | OUTPATIENT
Start: 2018-01-06 | End: 2018-01-06

## 2018-01-06 RX ORDER — INSULIN NPH HUM/REG INSULIN HM 70-30/ML
6 VIAL (ML) SUBCUTANEOUS
Qty: 0 | Refills: 0 | Status: DISCONTINUED | OUTPATIENT
Start: 2018-01-06 | End: 2018-01-09

## 2018-01-06 RX ADMIN — Medication 2: at 09:21

## 2018-01-06 RX ADMIN — Medication 6: at 17:51

## 2018-01-06 RX ADMIN — Medication 6 UNIT(S): at 17:51

## 2018-01-06 RX ADMIN — Medication 20 MILLIGRAM(S): at 17:17

## 2018-01-06 RX ADMIN — PANTOPRAZOLE SODIUM 40 MILLIGRAM(S): 20 TABLET, DELAYED RELEASE ORAL at 17:17

## 2018-01-06 RX ADMIN — Medication 100 MILLIGRAM(S): at 11:56

## 2018-01-06 RX ADMIN — LOSARTAN POTASSIUM 100 MILLIGRAM(S): 100 TABLET, FILM COATED ORAL at 05:24

## 2018-01-06 RX ADMIN — ENOXAPARIN SODIUM 40 MILLIGRAM(S): 100 INJECTION SUBCUTANEOUS at 17:17

## 2018-01-06 RX ADMIN — PANTOPRAZOLE SODIUM 40 MILLIGRAM(S): 20 TABLET, DELAYED RELEASE ORAL at 05:24

## 2018-01-06 RX ADMIN — AMLODIPINE BESYLATE 10 MILLIGRAM(S): 2.5 TABLET ORAL at 05:25

## 2018-01-06 RX ADMIN — Medication 20 MILLIEQUIVALENT(S): at 01:23

## 2018-01-06 RX ADMIN — Medication 400 MILLIGRAM(S): at 01:30

## 2018-01-06 RX ADMIN — Medication 1000 MILLIGRAM(S): at 01:45

## 2018-01-06 RX ADMIN — Medication 20 MILLIEQUIVALENT(S): at 04:02

## 2018-01-06 RX ADMIN — LACOSAMIDE 100 MILLIGRAM(S): 50 TABLET ORAL at 17:17

## 2018-01-06 RX ADMIN — LACOSAMIDE 100 MILLIGRAM(S): 50 TABLET ORAL at 05:25

## 2018-01-06 RX ADMIN — Medication 2: at 11:56

## 2018-01-06 RX ADMIN — Medication 2: at 22:50

## 2018-01-06 RX ADMIN — POTASSIUM PHOSPHATE, MONOBASIC POTASSIUM PHOSPHATE, DIBASIC 62.5 MILLIMOLE(S): 236; 224 INJECTION, SOLUTION INTRAVENOUS at 01:18

## 2018-01-06 RX ADMIN — Medication 10 MILLIGRAM(S): at 02:15

## 2018-01-06 RX ADMIN — Medication 20 MILLIEQUIVALENT(S): at 05:25

## 2018-01-06 RX ADMIN — Medication 50 MILLIGRAM(S): at 05:25

## 2018-01-06 RX ADMIN — Medication 20 MILLIGRAM(S): at 05:25

## 2018-01-06 NOTE — OCCUPATIONAL THERAPY INITIAL EVALUATION ADULT - PLANNED THERAPY INTERVENTIONS, OT EVAL
parent/caregiver training.../cognitive, visual perceptual/fine motor coordination training/neuromuscular re-education/transfer training/ADL retraining/balance training/bed mobility training/strengthening

## 2018-01-06 NOTE — PHYSICAL THERAPY INITIAL EVALUATION ADULT - DIAGNOSIS, PT EVAL
decreased functional mobility secondary to decreased strength, impaired balance, decreased endurance

## 2018-01-06 NOTE — PROGRESS NOTE ADULT - SUBJECTIVE AND OBJECTIVE BOX
Patient Seen and Examined.     Overnight Events:   None    T(C): 37.1 (01-06-18 @ 03:00), Max: 37.7 (01-05-18 @ 07:00)  HR: 88 (01-06-18 @ 03:00) (76 - 92)  BP: 139/61 (01-06-18 @ 03:00) (113/46 - 185/68)  RR: 22 (01-06-18 @ 03:00) (11 - 26)  SpO2: 100% (01-06-18 @ 03:00) (90% - 100%)    Exam:   Awake, Alert, AOX3  PERRL, EOMI, Face equal, Tongue m/l  5/5 throughout, no drift  SILT    amLODIPine   Tablet 10 milliGRAM(s) Oral daily  dextrose 5%. 1000 milliLiter(s) IV Continuous <Continuous>  dextrose 50% Injectable 12.5 Gram(s) IV Push once  dextrose 50% Injectable 25 Gram(s) IV Push once  dextrose 50% Injectable 25 Gram(s) IV Push once  dextrose Gel 1 Dose(s) Oral once PRN  docusate sodium 100 milliGRAM(s) Oral daily  enoxaparin Injectable 40 milliGRAM(s) SubCutaneous <User Schedule>  furosemide    Tablet 20 milliGRAM(s) Oral every 12 hours  glucagon  Injectable 1 milliGRAM(s) IntraMuscular once PRN  insulin glargine Injectable (LANTUS) 5 Unit(s) SubCutaneous at bedtime  insulin lispro (HumaLOG) corrective regimen sliding scale   SubCutaneous Before meals and at bedtime  lacosamide 100 milliGRAM(s) Oral two times a day  losartan 100 milliGRAM(s) Oral daily  metoprolol succinate ER 50 milliGRAM(s) Oral daily  pantoprazole  Injectable 40 milliGRAM(s) IV Push two times a day  potassium chloride    Tablet ER 20 milliEquivalent(s) Oral every 2 hours  senna 2 Tablet(s) Oral at bedtime                            12.1   9.0   )-----------( 173      ( 06 Jan 2018 00:08 )             35.1     01-06    139  |  101  |  10  ----------------------------<  171<H>  3.3<L>   |  26  |  0.77    Ca    8.9      06 Jan 2018 00:08  Phos  2.2     01-06  Mg     1.8     01-06          Imaging:
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
## 2018-01-06 NOTE — CHART NOTE - NSCHARTNOTEFT_GEN_A_CORE
SD VIRGIL drain was taken off of suction and removed using standard technique. Two staples were placed. no complications, patient tolerated well.

## 2018-01-06 NOTE — PHYSICAL THERAPY INITIAL EVALUATION ADULT - CRITERIA FOR SKILLED THERAPEUTIC INTERVENTIONS
rehab potential/therapy frequency/predicted duration of therapy intervention/functional limitations in following categories/anticipated discharge recommendation/impairments found
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
## 2018-01-06 NOTE — PHYSICAL THERAPY INITIAL EVALUATION ADULT - ADDITIONAL COMMENTS
1/5 CT head:  Since 1/3/2018 a left posterior frontal small craniotomy defect has been placed with a subdural drain. There is a small amount of residual subdural collection which is significantly smaller when compared with the prior exam with almost complete resolution of midline shift.  1/5 XR chest: The heart is enlarged. No pleural effusion could be identified on the current study. Prominent right hilum the left lung is clear. Calcified aortic knob. Degenerative changes of the thoracic spine. The bones appear to be demineralized.

## 2018-01-06 NOTE — PROGRESS NOTE ADULT - ASSESSMENT
· Assessment		  Summary: 89y F with acute on chronic SDH now POD 2 s/p left craniotomy    NEURO: acute on chronic SDH s/p crainotomy, VIRGIL drain out  Keppra for seizure ppx  Out of bed to chair, dressing changes  Stable for transfer to the floors.  Monitor Na - BMP in the AM      CARDS:  Afib rate controlled, HTN on home meds  Goal -150  Hold Aspirin    PULM:  sat > 92%    RENAL:  continue lasix 40mg bid; goal net negative    GASTRO:  continue protonix bid for possible coffee ground emesis yesterday  DASH diet    HEME:  monitor H/H    Follow-up DVT US  DVT prophylaxis: SCDs, lovenox    ENDO:  ISS, switch to her home dose of Novalin 25 in AM today (since she recived 5 units of lantus last night), and the 30 units standing daily from the following day after) and 6 units qHS.     ID:  afebrile; on vanc/zosyn    Code status:  Full code  Disposition:  ICU    This patient was at high risk of neurologic deterioration and/or death due to: herniation    Time spent:  45 minutes · Assessment		  Summary: 89y F with acute on chronic SDH now POD 2 s/p left craniotomy    NEURO: acute on chronic SDH s/p crainotomy, VIRGIL drain removed today  Keppra for seizure ppx x7 days  Out of bed to chair, dressing changes  Stable for transfer to the floors.  Monitor Na - BMP in the AM, avoid drop in sodium      CARDS:  Afib rate controlled, HTN on home meds  Goal -150  Hold Aspirin for now    PULM:  sat > 92%    RENAL:  continue lasix 20mg bid; her home medication, moniotr for hypokalemia    GASTRO:  continue protonix bid for possible coffee ground emesis yesterday, if hgb remains stable, and no recurent episodes can DC tomorrow  DASH diet    HEME:  monitor H/H    Follow-up DVT US  DVT prophylaxis: SCDs, lovenox 40 qhs starting tomorrow    ENDO:  ISS, switch to her home dose of Novalin 25 in AM today (since she recived 5 units of lantus last night), and the 30 units standing daily from the following day after) and 6 units qHS.     ID:  afebrile; no abx     Code status:  Full code  Disposition:  ICU    high complexity

## 2018-01-07 DIAGNOSIS — I48.91 UNSPECIFIED ATRIAL FIBRILLATION: ICD-10-CM

## 2018-01-07 DIAGNOSIS — E11.9 TYPE 2 DIABETES MELLITUS WITHOUT COMPLICATIONS: ICD-10-CM

## 2018-01-07 DIAGNOSIS — I10 ESSENTIAL (PRIMARY) HYPERTENSION: ICD-10-CM

## 2018-01-07 DIAGNOSIS — I38 ENDOCARDITIS, VALVE UNSPECIFIED: ICD-10-CM

## 2018-01-07 DIAGNOSIS — Z29.9 ENCOUNTER FOR PROPHYLACTIC MEASURES, UNSPECIFIED: ICD-10-CM

## 2018-01-07 DIAGNOSIS — I62.03 NONTRAUMATIC CHRONIC SUBDURAL HEMORRHAGE: ICD-10-CM

## 2018-01-07 LAB
ANION GAP SERPL CALC-SCNC: 10 MMOL/L — SIGNIFICANT CHANGE UP (ref 5–17)
BUN SERPL-MCNC: 8 MG/DL — SIGNIFICANT CHANGE UP (ref 7–23)
CALCIUM SERPL-MCNC: 10.1 MG/DL — SIGNIFICANT CHANGE UP (ref 8.4–10.5)
CHLORIDE SERPL-SCNC: 99 MMOL/L — SIGNIFICANT CHANGE UP (ref 96–108)
CO2 SERPL-SCNC: 28 MMOL/L — SIGNIFICANT CHANGE UP (ref 22–31)
CREAT SERPL-MCNC: 0.71 MG/DL — SIGNIFICANT CHANGE UP (ref 0.5–1.3)
GLUCOSE BLDC GLUCOMTR-MCNC: 113 MG/DL — HIGH (ref 70–99)
GLUCOSE BLDC GLUCOMTR-MCNC: 122 MG/DL — HIGH (ref 70–99)
GLUCOSE BLDC GLUCOMTR-MCNC: 163 MG/DL — HIGH (ref 70–99)
GLUCOSE BLDC GLUCOMTR-MCNC: 207 MG/DL — HIGH (ref 70–99)
GLUCOSE SERPL-MCNC: 231 MG/DL — HIGH (ref 70–99)
HCT VFR BLD CALC: 37.1 % — SIGNIFICANT CHANGE UP (ref 34.5–45)
HGB BLD-MCNC: 12.7 G/DL — SIGNIFICANT CHANGE UP (ref 11.5–15.5)
MCHC RBC-ENTMCNC: 32.3 PG — SIGNIFICANT CHANGE UP (ref 27–34)
MCHC RBC-ENTMCNC: 34.4 GM/DL — SIGNIFICANT CHANGE UP (ref 32–36)
MCV RBC AUTO: 94 FL — SIGNIFICANT CHANGE UP (ref 80–100)
PLATELET # BLD AUTO: 160 K/UL — SIGNIFICANT CHANGE UP (ref 150–400)
POTASSIUM SERPL-MCNC: 4.2 MMOL/L — SIGNIFICANT CHANGE UP (ref 3.5–5.3)
POTASSIUM SERPL-SCNC: 4.2 MMOL/L — SIGNIFICANT CHANGE UP (ref 3.5–5.3)
RBC # BLD: 3.94 M/UL — SIGNIFICANT CHANGE UP (ref 3.8–5.2)
RBC # FLD: 13.4 % — SIGNIFICANT CHANGE UP (ref 10.3–14.5)
SODIUM SERPL-SCNC: 137 MMOL/L — SIGNIFICANT CHANGE UP (ref 135–145)
WBC # BLD: 7.4 K/UL — SIGNIFICANT CHANGE UP (ref 3.8–10.5)
WBC # FLD AUTO: 7.4 K/UL — SIGNIFICANT CHANGE UP (ref 3.8–10.5)

## 2018-01-07 PROCEDURE — 99233 SBSQ HOSP IP/OBS HIGH 50: CPT

## 2018-01-07 PROCEDURE — 71045 X-RAY EXAM CHEST 1 VIEW: CPT | Mod: 26

## 2018-01-07 PROCEDURE — 70450 CT HEAD/BRAIN W/O DYE: CPT | Mod: 26

## 2018-01-07 RX ORDER — HYDRALAZINE HCL 50 MG
5 TABLET ORAL
Qty: 0 | Refills: 0 | Status: DISCONTINUED | OUTPATIENT
Start: 2018-01-07 | End: 2018-01-13

## 2018-01-07 RX ORDER — FUROSEMIDE 40 MG
10 TABLET ORAL ONCE
Qty: 0 | Refills: 0 | Status: COMPLETED | OUTPATIENT
Start: 2018-01-07 | End: 2018-01-08

## 2018-01-07 RX ORDER — INSULIN NPH HUM/REG INSULIN HM 70-30/ML
12 VIAL (ML) SUBCUTANEOUS
Qty: 0 | Refills: 0 | Status: COMPLETED | OUTPATIENT
Start: 2018-01-07 | End: 2018-01-07

## 2018-01-07 RX ORDER — LACOSAMIDE 50 MG/1
50 TABLET ORAL ONCE
Qty: 0 | Refills: 0 | Status: DISCONTINUED | OUTPATIENT
Start: 2018-01-07 | End: 2018-01-07

## 2018-01-07 RX ORDER — LACOSAMIDE 50 MG/1
150 TABLET ORAL
Qty: 0 | Refills: 0 | Status: DISCONTINUED | OUTPATIENT
Start: 2018-01-07 | End: 2018-01-13

## 2018-01-07 RX ORDER — OXYCODONE AND ACETAMINOPHEN 5; 325 MG/1; MG/1
1 TABLET ORAL EVERY 4 HOURS
Qty: 0 | Refills: 0 | Status: DISCONTINUED | OUTPATIENT
Start: 2018-01-07 | End: 2018-01-08

## 2018-01-07 RX ADMIN — OXYCODONE AND ACETAMINOPHEN 1 TABLET(S): 5; 325 TABLET ORAL at 05:30

## 2018-01-07 RX ADMIN — Medication 20 MILLIGRAM(S): at 17:42

## 2018-01-07 RX ADMIN — ENOXAPARIN SODIUM 40 MILLIGRAM(S): 100 INJECTION SUBCUTANEOUS at 17:41

## 2018-01-07 RX ADMIN — Medication 12 UNIT(S): at 10:00

## 2018-01-07 RX ADMIN — Medication 4: at 08:30

## 2018-01-07 RX ADMIN — AMLODIPINE BESYLATE 10 MILLIGRAM(S): 2.5 TABLET ORAL at 05:33

## 2018-01-07 RX ADMIN — SENNA PLUS 2 TABLET(S): 8.6 TABLET ORAL at 21:12

## 2018-01-07 RX ADMIN — LACOSAMIDE 100 MILLIGRAM(S): 50 TABLET ORAL at 17:42

## 2018-01-07 RX ADMIN — LOSARTAN POTASSIUM 100 MILLIGRAM(S): 100 TABLET, FILM COATED ORAL at 05:32

## 2018-01-07 RX ADMIN — PANTOPRAZOLE SODIUM 40 MILLIGRAM(S): 20 TABLET, DELAYED RELEASE ORAL at 05:33

## 2018-01-07 RX ADMIN — LACOSAMIDE 50 MILLIGRAM(S): 50 TABLET ORAL at 21:12

## 2018-01-07 RX ADMIN — Medication 5 MILLIGRAM(S): at 23:23

## 2018-01-07 RX ADMIN — LACOSAMIDE 100 MILLIGRAM(S): 50 TABLET ORAL at 05:33

## 2018-01-07 RX ADMIN — Medication 50 MILLIGRAM(S): at 05:33

## 2018-01-07 RX ADMIN — Medication 20 MILLIGRAM(S): at 05:33

## 2018-01-07 RX ADMIN — Medication 100 MILLIGRAM(S): at 11:46

## 2018-01-07 RX ADMIN — Medication 6 UNIT(S): at 17:42

## 2018-01-07 RX ADMIN — Medication 5 MILLIGRAM(S): at 00:23

## 2018-01-07 RX ADMIN — Medication 2: at 11:50

## 2018-01-07 RX ADMIN — OXYCODONE AND ACETAMINOPHEN 1 TABLET(S): 5; 325 TABLET ORAL at 15:36

## 2018-01-07 RX ADMIN — PANTOPRAZOLE SODIUM 40 MILLIGRAM(S): 20 TABLET, DELAYED RELEASE ORAL at 17:45

## 2018-01-07 NOTE — PROGRESS NOTE ADULT - SUBJECTIVE AND OBJECTIVE BOX
SUBJECTIVE: Patient resting comfortably in chair    OVERNIGHT EVENTS: none    Vital Signs Last 24 Hrs  T(C): 36.6 (07 Jan 2018 11:00), Max: 37.7 (07 Jan 2018 05:13)  T(F): 97.9 (07 Jan 2018 11:00), Max: 99.9 (07 Jan 2018 05:13)  HR: 80 (07 Jan 2018 11:00) (76 - 108)  BP: 151/72 (07 Jan 2018 11:00) (129/66 - 164/76)  BP(mean): --  RR: 18 (07 Jan 2018 11:00) (18 - 18)  SpO2: 98% (07 Jan 2018 11:00) (98% - 100%)                          12.7   7.4   )-----------( 160      ( 07 Jan 2018 05:50 )             37.1    01-07    137  |  99  |  8   ----------------------------<  231<H>  4.2   |  28  |  0.71    Ca    10.1      07 Jan 2018 05:50  Phos  2.2     01-06  Mg     1.8     01-06    PHYSICAL EXAM:    General: No Acute Distress     Neurological: Awake, alert oriented to person, place, expressive aphasia, Following Commands, PERRL, EOMI, Face Symmetrical, Speech Fluent, Moving all extremities, Right drift, right upper and lower extremity 4/5, left upper and lower extremity 5/5, Sensation to Light Touch Intact    Pulmonary: Clear to Auscultation, No Rales, No Rhonchi, No Wheezes     Cardiovascular: S1, S2, Regular Rate and Rhythm     Gastrointestinal: Soft, Nontender, Nondistended     Extremities: No calf tenderness
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
## 2018-01-07 NOTE — CONSULT NOTE ADULT - PROBLEM SELECTOR RECOMMENDATION 5
s/p treatment with antibiotics in 2010, no active issues at present  - continue monitoring clinically

## 2018-01-07 NOTE — PROGRESS NOTE ADULT - ASSESSMENT
89 year old female, with past medical history of Atrial fibrillation on Aspirin 81 mg (last taken 1/2/2018) brought to [facility] by her daughter (Juana Manuel) due progressive headaches that began 6 days prior and found to have left chronic subdural hematoma with 1.2cm MLS.     s/p Left craniotomy for evacuation of subdural hematoma on 1/4/18    -Neuro: exam change aphasia and right drift. Will obtain urgent CT head. Neurochecks q4h. Continue vimpat.  -Glucose control NPH 70/30 25 units qAM, 6 units QHS  -HTN control hydralazine, norvasc, toprol  -Diet CCD  -DVT prophylaxis : Lovenox  -PT: SHERITA

## 2018-01-07 NOTE — CONSULT NOTE ADULT - PROBLEM SELECTOR RECOMMENDATION 4
Continue toprol xl 50mg po daily, losartan 100mg po daily, lasix 20-mg po bid  - continue hydralazine pushes PRN

## 2018-01-07 NOTE — CONSULT NOTE ADULT - PROBLEM SELECTOR RECOMMENDATION 6
- ppx: hold given SDH, may continue SCDs  - diet: consistent carb w evening snack  - dispo: neurosurgery floor - ppx: continue lovenox per neurosurgery  - diet: consistent carb w evening snack  - dispo: neurosurgery floor

## 2018-01-07 NOTE — CONSULT NOTE ADULT - PROBLEM SELECTOR RECOMMENDATION 9
s/p L burrhole evacuation and drain placement. Drain now removed, patient stable.  - continue AEDs for seizure prophylaxis lacosamide 100mg po bid  - continue care as per neurosurgery  - continue neurochecks

## 2018-01-07 NOTE — CONSULT NOTE ADULT - PROBLEM SELECTOR RECOMMENDATION 3
Continue NPH insulin 70/30 25 units QAm w breakfast, 6 units QHS  - continue sliding scale insulin with fingerstick monitoring

## 2018-01-08 DIAGNOSIS — R47.01 APHASIA: ICD-10-CM

## 2018-01-08 DIAGNOSIS — R50.9 FEVER, UNSPECIFIED: ICD-10-CM

## 2018-01-08 DIAGNOSIS — G93.40 ENCEPHALOPATHY, UNSPECIFIED: ICD-10-CM

## 2018-01-08 DIAGNOSIS — R41.82 ALTERED MENTAL STATUS, UNSPECIFIED: ICD-10-CM

## 2018-01-08 DIAGNOSIS — R53.1 WEAKNESS: ICD-10-CM

## 2018-01-08 LAB
ANION GAP SERPL CALC-SCNC: 13 MMOL/L — SIGNIFICANT CHANGE UP (ref 5–17)
ANION GAP SERPL CALC-SCNC: 16 MMOL/L — SIGNIFICANT CHANGE UP (ref 5–17)
APPEARANCE UR: ABNORMAL
BILIRUB UR-MCNC: NEGATIVE — SIGNIFICANT CHANGE UP
BUN SERPL-MCNC: 12 MG/DL — SIGNIFICANT CHANGE UP (ref 7–23)
BUN SERPL-MCNC: 15 MG/DL — SIGNIFICANT CHANGE UP (ref 7–23)
CALCIUM SERPL-MCNC: 10.2 MG/DL — SIGNIFICANT CHANGE UP (ref 8.4–10.5)
CALCIUM SERPL-MCNC: 10.6 MG/DL — HIGH (ref 8.4–10.5)
CHLORIDE SERPL-SCNC: 95 MMOL/L — LOW (ref 96–108)
CHLORIDE SERPL-SCNC: 96 MMOL/L — SIGNIFICANT CHANGE UP (ref 96–108)
CO2 SERPL-SCNC: 25 MMOL/L — SIGNIFICANT CHANGE UP (ref 22–31)
CO2 SERPL-SCNC: 26 MMOL/L — SIGNIFICANT CHANGE UP (ref 22–31)
COLOR SPEC: YELLOW — SIGNIFICANT CHANGE UP
CREAT SERPL-MCNC: 0.75 MG/DL — SIGNIFICANT CHANGE UP (ref 0.5–1.3)
CREAT SERPL-MCNC: 0.86 MG/DL — SIGNIFICANT CHANGE UP (ref 0.5–1.3)
DIFF PNL FLD: ABNORMAL
GLUCOSE BLDC GLUCOMTR-MCNC: 151 MG/DL — HIGH (ref 70–99)
GLUCOSE BLDC GLUCOMTR-MCNC: 171 MG/DL — HIGH (ref 70–99)
GLUCOSE BLDC GLUCOMTR-MCNC: 176 MG/DL — HIGH (ref 70–99)
GLUCOSE BLDC GLUCOMTR-MCNC: 188 MG/DL — HIGH (ref 70–99)
GLUCOSE SERPL-MCNC: 133 MG/DL — HIGH (ref 70–99)
GLUCOSE SERPL-MCNC: 181 MG/DL — HIGH (ref 70–99)
GLUCOSE UR QL: NEGATIVE — SIGNIFICANT CHANGE UP
HCT VFR BLD CALC: 36.4 % — SIGNIFICANT CHANGE UP (ref 34.5–45)
HCT VFR BLD CALC: 38 % — SIGNIFICANT CHANGE UP (ref 34.5–45)
HGB BLD-MCNC: 12.3 G/DL — SIGNIFICANT CHANGE UP (ref 11.5–15.5)
HGB BLD-MCNC: 12.4 G/DL — SIGNIFICANT CHANGE UP (ref 11.5–15.5)
KETONES UR-MCNC: ABNORMAL
LEUKOCYTE ESTERASE UR-ACNC: NEGATIVE — SIGNIFICANT CHANGE UP
MCHC RBC-ENTMCNC: 30 PG — SIGNIFICANT CHANGE UP (ref 27–34)
MCHC RBC-ENTMCNC: 31.4 PG — SIGNIFICANT CHANGE UP (ref 27–34)
MCHC RBC-ENTMCNC: 32.6 GM/DL — SIGNIFICANT CHANGE UP (ref 32–36)
MCHC RBC-ENTMCNC: 33.7 GM/DL — SIGNIFICANT CHANGE UP (ref 32–36)
MCV RBC AUTO: 91.8 FL — SIGNIFICANT CHANGE UP (ref 80–100)
MCV RBC AUTO: 93.1 FL — SIGNIFICANT CHANGE UP (ref 80–100)
NITRITE UR-MCNC: NEGATIVE — SIGNIFICANT CHANGE UP
PH UR: 5.5 — SIGNIFICANT CHANGE UP (ref 5–8)
PLATELET # BLD AUTO: 165 K/UL — SIGNIFICANT CHANGE UP (ref 150–400)
PLATELET # BLD AUTO: 175 K/UL — SIGNIFICANT CHANGE UP (ref 150–400)
POTASSIUM SERPL-MCNC: 3.9 MMOL/L — SIGNIFICANT CHANGE UP (ref 3.5–5.3)
POTASSIUM SERPL-MCNC: 4.2 MMOL/L — SIGNIFICANT CHANGE UP (ref 3.5–5.3)
POTASSIUM SERPL-SCNC: 3.9 MMOL/L — SIGNIFICANT CHANGE UP (ref 3.5–5.3)
POTASSIUM SERPL-SCNC: 4.2 MMOL/L — SIGNIFICANT CHANGE UP (ref 3.5–5.3)
PROT UR-MCNC: 30 MG/DL
RBC # BLD: 3.91 M/UL — SIGNIFICANT CHANGE UP (ref 3.8–5.2)
RBC # BLD: 4.14 M/UL — SIGNIFICANT CHANGE UP (ref 3.8–5.2)
RBC # FLD: 13.6 % — SIGNIFICANT CHANGE UP (ref 10.3–14.5)
RBC # FLD: 14.5 % — SIGNIFICANT CHANGE UP (ref 10.3–14.5)
SODIUM SERPL-SCNC: 134 MMOL/L — LOW (ref 135–145)
SODIUM SERPL-SCNC: 137 MMOL/L — SIGNIFICANT CHANGE UP (ref 135–145)
SP GR SPEC: 1.01 — SIGNIFICANT CHANGE UP (ref 1.01–1.02)
UROBILINOGEN FLD QL: NEGATIVE — SIGNIFICANT CHANGE UP
WBC # BLD: 5.66 K/UL — SIGNIFICANT CHANGE UP (ref 3.8–10.5)
WBC # BLD: 7.3 K/UL — SIGNIFICANT CHANGE UP (ref 3.8–10.5)
WBC # FLD AUTO: 5.66 K/UL — SIGNIFICANT CHANGE UP (ref 3.8–10.5)
WBC # FLD AUTO: 7.3 K/UL — SIGNIFICANT CHANGE UP (ref 3.8–10.5)

## 2018-01-08 PROCEDURE — 99233 SBSQ HOSP IP/OBS HIGH 50: CPT

## 2018-01-08 PROCEDURE — 93970 EXTREMITY STUDY: CPT | Mod: 26

## 2018-01-08 RX ORDER — TRAMADOL HYDROCHLORIDE 50 MG/1
25 TABLET ORAL EVERY 4 HOURS
Qty: 0 | Refills: 0 | Status: DISCONTINUED | OUTPATIENT
Start: 2018-01-08 | End: 2018-01-13

## 2018-01-08 RX ORDER — DEXTROSE MONOHYDRATE, SODIUM CHLORIDE, AND POTASSIUM CHLORIDE 50; .745; 4.5 G/1000ML; G/1000ML; G/1000ML
1000 INJECTION, SOLUTION INTRAVENOUS
Qty: 0 | Refills: 0 | Status: DISCONTINUED | OUTPATIENT
Start: 2018-01-08 | End: 2018-01-08

## 2018-01-08 RX ORDER — ACETAMINOPHEN 500 MG
1000 TABLET ORAL ONCE
Qty: 0 | Refills: 0 | Status: COMPLETED | OUTPATIENT
Start: 2018-01-08 | End: 2018-01-08

## 2018-01-08 RX ADMIN — Medication 10 MILLIGRAM(S): at 01:11

## 2018-01-08 RX ADMIN — LOSARTAN POTASSIUM 100 MILLIGRAM(S): 100 TABLET, FILM COATED ORAL at 06:35

## 2018-01-08 RX ADMIN — LACOSAMIDE 150 MILLIGRAM(S): 50 TABLET ORAL at 17:52

## 2018-01-08 RX ADMIN — Medication 100 MILLIGRAM(S): at 12:47

## 2018-01-08 RX ADMIN — Medication: at 12:47

## 2018-01-08 RX ADMIN — Medication: at 08:59

## 2018-01-08 RX ADMIN — ENOXAPARIN SODIUM 40 MILLIGRAM(S): 100 INJECTION SUBCUTANEOUS at 17:52

## 2018-01-08 RX ADMIN — SENNA PLUS 2 TABLET(S): 8.6 TABLET ORAL at 21:30

## 2018-01-08 RX ADMIN — PANTOPRAZOLE SODIUM 40 MILLIGRAM(S): 20 TABLET, DELAYED RELEASE ORAL at 17:53

## 2018-01-08 RX ADMIN — Medication 20 MILLIGRAM(S): at 06:33

## 2018-01-08 RX ADMIN — PANTOPRAZOLE SODIUM 40 MILLIGRAM(S): 20 TABLET, DELAYED RELEASE ORAL at 06:32

## 2018-01-08 RX ADMIN — LACOSAMIDE 150 MILLIGRAM(S): 50 TABLET ORAL at 06:30

## 2018-01-08 RX ADMIN — Medication 2: at 22:02

## 2018-01-08 RX ADMIN — Medication 20 MILLIGRAM(S): at 17:52

## 2018-01-08 RX ADMIN — Medication 2: at 17:53

## 2018-01-08 RX ADMIN — Medication 50 MILLIGRAM(S): at 06:32

## 2018-01-08 RX ADMIN — Medication 400 MILLIGRAM(S): at 23:46

## 2018-01-08 RX ADMIN — AMLODIPINE BESYLATE 10 MILLIGRAM(S): 2.5 TABLET ORAL at 06:35

## 2018-01-08 RX ADMIN — Medication 6 UNIT(S): at 17:52

## 2018-01-08 NOTE — PROGRESS NOTE ADULT - SUBJECTIVE AND OBJECTIVE BOX
Patient seen and examined  Was aphasic w/ acute R weakness overnight  Started after patient got Percocet    Now awake, alert, still somewhat aphasic  Oriented x 2 w/ choices  FC  RUE 4/5     Planning for MRI tonight  Neurology consult appreciated   Possible VEEG tomorrow  Continue Vimpat in interim   No UTI

## 2018-01-08 NOTE — PROVIDER CONTACT NOTE (OTHER) - SITUATION
Pt.'s neuro status changed, followed only one command, drift on the right arm, right leg no effort against gravity
89 y/o F. Adm 1/3/18 for H/A, confusion
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
## 2018-01-08 NOTE — CONSULT NOTE ADULT - PROBLEM SELECTOR RECOMMENDATION 2
AC currently contraindicated given SDH s/p evacuation. EKG with sinus rhythm this admission  - continue monitoring clinically  - continue toprol xl 50mg po daily  -
PT/OT

## 2018-01-08 NOTE — PROVIDER CONTACT NOTE (OTHER) - REASON
Pt has new onset of expressive aphasia and r. drift
Pt.'s neuro status changed, followed only one command, drift on the right arm, right leg no effort against gravity

## 2018-01-08 NOTE — CONSULT NOTE ADULT - ATTENDING COMMENTS
Seen and examined on rounds with housestaff.  L SDH s/p drainage with R hemiparesis and aphasia.  C/w Vimpat 150 mg BID as AED.  F/u EEG to ensure no underlying seizures.

## 2018-01-08 NOTE — PROGRESS NOTE ADULT - SUBJECTIVE AND OBJECTIVE BOX
CHIEF COMPLAINT: patient in NAD, alert, making grunting noises but non verbal (daughter at bedside reports this as baseline)   following some simple commands and grossly BERGER but overall week and poor effort    Vital Signs Last 24 Hrs  T(C): 37.6 (2018 15:29), Max: 38 (2018 23:23)  T(F): 99.6 (2018 15:29), Max: 100.4 (2018 23:23)  HR: 78 (2018 15:29) (60 - 100)  BP: 168/68 (2018 15:29) (139/70 - 177/77)  BP(mean): --  RR: 18 (2018 15:29) (18 - 22)  SpO2: 100% (2018 15:29) (97% - 100%)    PHYSICAL EXAM:    General: No Acute Distress     Neurological: Awake, alert, in NAD, making grunting noises but non verbal (daughter at bedside reports this as baseline)   following some simple commands and grossly BERGER but overall weak and poor effort    Pulmonary: Clear to Auscultation, No Rales, No Rhonchi, No Wheezes     Cardiovascular: S1, S2, Regular Rate and Rhythm     Gastrointestinal: Soft, Nontender, Nondistended     Extremities: No calf tenderness     Incision: +staples c/d/i, thick bloody scab over incision    LABS:                        12.4   5.66  )-----------( 175      ( 2018 07:16 )             38.0        137  |  96  |  15  ----------------------------<  181<H>  4.2   |  25  |  0.86    Ca    10.6<H>      2018 07:22  Urine Microscopic-Add On (NC) (18 @ 07:03)    Bacteria: Few /HPF    Epithelial Cells: Occasional /HPF    White Blood Cell - Urine: 2-5 /HPF    Hyaline Casts: 2-5    Hemoglobin A1C, Whole Blood: 6.2 % ( @ 07:22)       @ 07:01  -   @ 07:00  --------------------------------------------------------  IN: 540 mL / OUT: 0 mL / NET: 540 mL     @ 07: @ 16:39  --------------------------------------------------------  IN: 0 mL / OUT: 0 mL / NET: 0 mL        MEDICATIONS:  Anticoagulation:  enoxaparin Injectable 40 milliGRAM(s) SubCutaneous <User Schedule>    Endo:  dextrose 50% Injectable 12.5 Gram(s) IV Push once  dextrose 50% Injectable 25 Gram(s) IV Push once  dextrose 50% Injectable 25 Gram(s) IV Push once  dextrose Gel 1 Dose(s) Oral once PRN  glucagon  Injectable 1 milliGRAM(s) IntraMuscular once PRN  insulin lispro (HumaLOG) corrective regimen sliding scale   SubCutaneous Before meals and at bedtime  insulin NPH/regular 70/30 Injectable 25 Unit(s) SubCutaneous before breakfast  insulin NPH/regular 70/30 Injectable 6 Unit(s) SubCutaneous before dinner    Neuro:  acetaminophen  IVPB. 1000 milliGRAM(s) IV Intermittent once PRN Moderate Pain (4 - 6)  lacosamide 150 milliGRAM(s) Oral two times a day  traMADol 25 milliGRAM(s) Oral every 4 hours PRN Moderate Pain (4 - 6)    Cardiac:  amLODIPine   Tablet 10 milliGRAM(s) Oral daily  furosemide    Tablet 20 milliGRAM(s) Oral every 12 hours  hydrALAZINE Injectable 5 milliGRAM(s) IV Push every 3 hours PRN HTN  losartan 100 milliGRAM(s) Oral daily  metoprolol succinate ER 50 milliGRAM(s) Oral daily    GI/:  docusate sodium 100 milliGRAM(s) Oral daily  pantoprazole  Injectable 40 milliGRAM(s) IV Push two times a day  senna 2 Tablet(s) Oral at bedtime    Other:   dextrose 5%. 1000 milliLiter(s) IV Continuous <Continuous>    DIET: [] Regular [x] CCD [] Renal [] Puree [] Dysphagia [] Tube Feeds:     IMAGIN < from: [facility] Duplex Lower Ext Vein Scan, Bilat (18 @ 10:10) >  No evidence of bilateral lower extremity deep venous thrombosis.  < end of copied text >\    2< from: Xray Chest 1 View AP- PORTABLE-Urgent (18 @ 22:38) >     Cardiomegaly  < end of copied text >    3< from: CT Head No Cont (18 @ 16:56) >    Residual subdural is again seen and unchanged when underlying     for differences in technique..  < end of copied text >

## 2018-01-08 NOTE — PROGRESS NOTE ADULT - PROBLEM SELECTOR PLAN 7
h/o infectious endocarditis s/p treatment with antibiotics in 2010, no active issues at present  - continue monitoring clinically.

## 2018-01-08 NOTE — PROVIDER CONTACT NOTE (OTHER) - ASSESSMENT
Pt. 's speech is aphasic, garbled and hyphonic
Pt has worsening expressive aphasia, and a slight RUE/RLE drift

## 2018-01-08 NOTE — CONSULT NOTE ADULT - PROBLEM SELECTOR RECOMMENDATION 9
MRI brain w/o contrast  VEEG  Vimpat recently increased from 100 mg BID to 150 mg BID by primary team; continue current dose for now pending EEG results MRI brain w/o contrast  VEEG  Vimpat recently increased from 100 mg BID to 150 mg BID by primary team; continue current dose for now pending EEG results  Would recommend labs- LFTs, ammonia, infectious work up as per primary team. Consider resending UA/culture

## 2018-01-08 NOTE — PROGRESS NOTE ADULT - ASSESSMENT
89m hx afib not on AC, HTN, DM, endocarditis treated in 2010, cervical ca s/p hysterectomy in 2000, prior PNA unable to be weaned off ventilator s/p trach that is now removed, transferred from OSH for chronic subdural hematoma evacuation

## 2018-01-08 NOTE — PROGRESS NOTE ADULT - SUBJECTIVE AND OBJECTIVE BOX
Patient is a 89y old  Female who presents with a chief complaint of left sided chronic SDH (2018 23:56)      SUBJECTIVE / OVERNIGHT EVENTS: As per daughter, patient was very confused last night, not following commands. Today, patient more awake, following simple commands. PT with low grade temp overnight.     MEDICATIONS  (STANDING):  amLODIPine   Tablet 10 milliGRAM(s) Oral daily  dextrose 5%. 1000 milliLiter(s) (50 mL/Hr) IV Continuous <Continuous>  dextrose 50% Injectable 12.5 Gram(s) IV Push once  dextrose 50% Injectable 25 Gram(s) IV Push once  dextrose 50% Injectable 25 Gram(s) IV Push once  docusate sodium 100 milliGRAM(s) Oral daily  enoxaparin Injectable 40 milliGRAM(s) SubCutaneous <User Schedule>  furosemide    Tablet 20 milliGRAM(s) Oral every 12 hours  insulin lispro (HumaLOG) corrective regimen sliding scale   SubCutaneous Before meals and at bedtime  insulin NPH/regular 70/30 Injectable 25 Unit(s) SubCutaneous before breakfast  insulin NPH/regular 70/30 Injectable 6 Unit(s) SubCutaneous before dinner  lacosamide 150 milliGRAM(s) Oral two times a day  losartan 100 milliGRAM(s) Oral daily  metoprolol succinate ER 50 milliGRAM(s) Oral daily  pantoprazole  Injectable 40 milliGRAM(s) IV Push two times a day  senna 2 Tablet(s) Oral at bedtime    MEDICATIONS  (PRN):  acetaminophen  IVPB. 1000 milliGRAM(s) IV Intermittent once PRN Moderate Pain (4 - 6)  dextrose Gel 1 Dose(s) Oral once PRN Blood Glucose LESS THAN 70 milliGRAM(s)/deciliter  glucagon  Injectable 1 milliGRAM(s) IntraMuscular once PRN Glucose LESS THAN 70 milligrams/deciliter  hydrALAZINE Injectable 5 milliGRAM(s) IV Push every 3 hours PRN HTN  traMADol 25 milliGRAM(s) Oral every 4 hours PRN Moderate Pain (4 - 6)      Vital Signs Last 24 Hrs  T(C): 36.6 (2018 08:57), Max: 38 (2018 23:23)  T(F): 97.9 (2018 08:57), Max: 100.4 (2018 23:23)  HR: 60 (2018 08:57) (60 - 100)  BP: 169/66 (2018 08:57) (139/70 - 177/77)  BP(mean): --  RR: 18 (2018 08:57) (18 - 22)  SpO2: 98% (2018 08:57) (97% - 100%)  CAPILLARY BLOOD GLUCOSE      POCT Blood Glucose.: 188 mg/dL (2018 08:48)  POCT Blood Glucose.: 113 mg/dL (2018 21:13)  POCT Blood Glucose.: 122 mg/dL (2018 17:31)    I&O's Summary    2018 07:01  -  2018 07:00  --------------------------------------------------------  IN: 540 mL / OUT: 0 mL / NET: 540 mL        PHYSICAL EXAM:  GENERAL: NAD   HEAD:  Atraumatic, Normocephalic  EYES: PERRLA, conjunctiva and sclera clear  NECK: Supple, No JVD  CHEST/LUNG: Clear to auscultation bilaterally; No wheeze  HEART: Regular rate and rhythm; +SM.   ABDOMEN: Soft, Nontender, Nondistended; Bowel sounds present  EXTREMITIES:  2+ Peripheral Pulses, No clubbing, cyanosis, or edema  NEUROLOGY: Opens eyes, non-verbal, follows simple commands.   SKIN: No rashes or lesions    LABS:                        12.4   5.66  )-----------( 175      ( 2018 07:16 )             38.0     -    137  |  96  |  15  ----------------------------<  181<H>  4.2   |  25  |  0.86    Ca    10.6<H>      2018 07:22            Urinalysis Basic - ( 2018 07:03 )    Color: Yellow / Appearance: SL Turbid / S.013 / pH: x  Gluc: x / Ketone: Trace  / Bili: Negative / Urobili: Negative   Blood: x / Protein: 30 mg/dL / Nitrite: Negative   Leuk Esterase: Negative / RBC: x / WBC 2-5 /HPF   Sq Epi: x / Non Sq Epi: Occasional /HPF / Bacteria: Few /HPF        RADIOLOGY & ADDITIONAL TESTS:    Imaging Personally Reviewed:  CXR reviewed: clear lungs     Consultant(s) Notes Reviewed:      Care Discussed with Consultants/Other Providers: Spoke with neurosurgery team, will follow up infectious w/up

## 2018-01-08 NOTE — CONSULT NOTE ADULT - ASSESSMENT
88 yo right handed female with history of atrial fibrillation, insulin-dependent DM, hypertension presenting with HA, confusion, and right sided weakness who was found to have a left chronic subdural hematoma s/p evacuation on 1/4/18.  Neurology consulted for aphasia noted since her surgery.  On exam is poorly cooperative, appears confused, globally aphasic with no focal CN deficits.  Unable to assess strength but appears that RUE is weaker than LUE.  CTH shows stable residual subdural hematoma s/p evacuation.  Differential- stroke vs seizure (subclinical) vs metabolic 89 yo right handed female with history of atrial fibrillation, insulin-dependent DM, hypertension presenting with HA, confusion, and right sided weakness who was found to have a left chronic subdural hematoma s/p left craniotomy with SDH evacuation on 1/4/18 and removal of subdural drain on 1/6/18.  Neurology consulted for aphasia noted since her surgery.  On exam is poorly cooperative, appears confused, globally aphasic with no focal CN deficits.  Unable to assess strength but RUE appears weaker than LUE.  CTH on 1/7 shows stable residual subdural hematoma s/p evacuation.  Differential- stroke vs seizure (subclinical) vs metabolic 88 yo right handed female with history of atrial fibrillation, insulin-dependent DM, hypertension presenting with HA, confusion, and right sided weakness who was found to have a left chronic subdural hematoma s/p left craniotomy with SDH evacuation on 1/4/18 and removal of subdural drain on 1/6/18.  Neurology consulted for aphasia noted since her surgery.  On exam is poorly cooperative, appears confused, globally aphasic with no focal CN deficits.  Unable to assess strength but RUE appears weaker than LUE.  CTH on 1/7 shows stable residual subdural hematoma s/p evacuation.  Differential- stroke vs seizure (subclinical) vs metabolic 88 yo right handed female with history of atrial fibrillation (on aspirin), insulin-dependent DM, hypertension presenting with HA, confusion, and right sided weakness who was found to have a left subdural hematoma s/p left craniotomy with SDH evacuation on 1/4/18 and removal of subdural drain on 1/6/18.  Neurology consulted for aphasia noted since her surgery.  On exam is poorly cooperative, appears confused, globally aphasic with no focal CN deficits.  Unable to assess strength but RUE appears weaker than LUE.  CTH on 1/7 shows stable residual subdural hematoma s/p evacuation.  Differential- stroke vs seizure (subclinical) 89 yo right handed female with history of atrial fibrillation (on aspirin), insulin-dependent DM, hypertension presenting with HA, confusion, and right sided weakness who was found to have a left subdural hematoma s/p left craniotomy with SDH evacuation on 1/4/18 and removal of subdural drain on 1/6/18.  Neurology consulted for aphasia noted since her surgery.  On exam is poorly cooperative, appears confused, globally aphasic with no focal CN deficits.  Motor exam limited but RUE appears weaker than LUE.  CTH on 1/7 shows stable residual subdural hematoma s/p evacuation.  Differential- stroke vs seizure (subclinical) vs metabolic vs infectious (+Fever last night)

## 2018-01-08 NOTE — CONSULT NOTE ADULT - SUBJECTIVE AND OBJECTIVE BOX
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 100.4 (07 Jan 2018 23:23)  HR: 60 (08 Jan 2018 08:57) (60 - 100)  BP: 169/66 (08 Jan 2018 08:57) (139/70 - 177/77)  BP(mean): --  RR: 18 (08 Jan 2018 08:57) (18 - 22)  SpO2: 98% (08 Jan 2018 08:57) (97% - 100%)    General Exam:   General appearance: Sleeping, requires frequent stimulation to wake up but poorly cooperative with exam    Neurological Exam:  Mental Status: No verbal output; does not respond to yes or no questions      Cranial Nerves: PERRL, EOMI, no nystagmus.  No facial asymmetry.  Tongue midline.    Motor:   Tone: normal.                  Strength:   Limited secondary to cooperation; can hold arms against gravity but not legs; moves RUE less than LUE           Dysmetria: Unable to cooperate with finger to nose or heel to shin testing  Tremor: No resting tremor.  No myoclonus.    Sensation: Unable to assess fully; withdraws to pain    Deep Tendon Reflexes: 1+ bilateral biceps, triceps, brachioradialis, knee and ankle  Toes flexor bilaterally    Gait: Deferred      01-08    137  |  96  |  15  ----------------------------<  181<H>  4.2   |  25  |  0.86    Ca    10.6<H>      08 Jan 2018 07:22      01-08    137  |  96  |  15  ----------------------------<  181<H>  4.2   |  25  |  0.86    Ca    10.6<H>      08 Jan 2018 07:22                            12.4   5.66  )-----------( 175      ( 08 Jan 2018 07:16 )             38.0       Radiology    < from: CT Head No Cont (01.07.18 @ 16:56) >  EXAM:  CT BRAIN                            PROCEDURE DATE:  01/07/2018            INTERPRETATION:  History: Status post surgery for subdural evacuation.    Multiple axial sections were performed from base of skull to vertex   without contrast enhancement. Coronal and sagittal reconstructions were   performed.    This exam is compared with noncontrast head CT performed on January 6, 2018.    Postop changes compatible high left posterior frontal parietal anatomy is   again seen.    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 This finding measures   approximately 2.1 cm and widest diameter and previously measured   approximately 2.2 cm and widest diameter. There is decreased extra-axial   air identified in this region. Localized mass effect is again seen. Mild   mass effect on the left lateral ventricle is again seen. No significant   shift or herniation is seen.    Parenchymal volume loss and chronic microvascular changes are identified.    The visualized paranasal sinuses mastoid and middle ear regions appear   clear.    Impression: Residual subdural is again seen and unchanged when underlying   for differences in technique..                    LARRY AMIN M.D., ATTENDING RADIOLOGIST  This document has been electronically signed. Jan 7 2018  5:39PM    < end of copied text >

## 2018-01-09 LAB
ALBUMIN SERPL ELPH-MCNC: 3.2 G/DL — LOW (ref 3.3–5)
ALP SERPL-CCNC: 86 U/L — SIGNIFICANT CHANGE UP (ref 40–120)
ALT FLD-CCNC: 11 U/L RC — SIGNIFICANT CHANGE UP (ref 10–45)
AMMONIA BLD-MCNC: 38 UMOL/L — SIGNIFICANT CHANGE UP (ref 11–55)
ANION GAP SERPL CALC-SCNC: 10 MMOL/L — SIGNIFICANT CHANGE UP (ref 5–17)
AST SERPL-CCNC: 16 U/L — SIGNIFICANT CHANGE UP (ref 10–40)
BASOPHILS # BLD AUTO: 0 K/UL — SIGNIFICANT CHANGE UP (ref 0–0.2)
BASOPHILS NFR BLD AUTO: 0.4 % — SIGNIFICANT CHANGE UP (ref 0–2)
BILIRUB SERPL-MCNC: 0.6 MG/DL — SIGNIFICANT CHANGE UP (ref 0.2–1.2)
BUN SERPL-MCNC: 16 MG/DL — SIGNIFICANT CHANGE UP (ref 7–23)
CALCIUM SERPL-MCNC: 10.1 MG/DL — SIGNIFICANT CHANGE UP (ref 8.4–10.5)
CHLORIDE SERPL-SCNC: 99 MMOL/L — SIGNIFICANT CHANGE UP (ref 96–108)
CO2 SERPL-SCNC: 30 MMOL/L — SIGNIFICANT CHANGE UP (ref 22–31)
CREAT SERPL-MCNC: 0.76 MG/DL — SIGNIFICANT CHANGE UP (ref 0.5–1.3)
EOSINOPHIL # BLD AUTO: 0.1 K/UL — SIGNIFICANT CHANGE UP (ref 0–0.5)
EOSINOPHIL NFR BLD AUTO: 2.6 % — SIGNIFICANT CHANGE UP (ref 0–6)
GLUCOSE BLDC GLUCOMTR-MCNC: 121 MG/DL — HIGH (ref 70–99)
GLUCOSE BLDC GLUCOMTR-MCNC: 138 MG/DL — HIGH (ref 70–99)
GLUCOSE BLDC GLUCOMTR-MCNC: 174 MG/DL — HIGH (ref 70–99)
GLUCOSE BLDC GLUCOMTR-MCNC: 48 MG/DL — LOW (ref 70–99)
GLUCOSE BLDC GLUCOMTR-MCNC: 52 MG/DL — LOW (ref 70–99)
GLUCOSE BLDC GLUCOMTR-MCNC: 75 MG/DL — SIGNIFICANT CHANGE UP (ref 70–99)
GLUCOSE SERPL-MCNC: 147 MG/DL — HIGH (ref 70–99)
HCT VFR BLD CALC: 36.4 % — SIGNIFICANT CHANGE UP (ref 34.5–45)
HGB BLD-MCNC: 12.2 G/DL — SIGNIFICANT CHANGE UP (ref 11.5–15.5)
LYMPHOCYTES # BLD AUTO: 1.7 K/UL — SIGNIFICANT CHANGE UP (ref 1–3.3)
LYMPHOCYTES # BLD AUTO: 31.7 % — SIGNIFICANT CHANGE UP (ref 13–44)
MCHC RBC-ENTMCNC: 31.4 PG — SIGNIFICANT CHANGE UP (ref 27–34)
MCHC RBC-ENTMCNC: 33.6 GM/DL — SIGNIFICANT CHANGE UP (ref 32–36)
MCV RBC AUTO: 93.4 FL — SIGNIFICANT CHANGE UP (ref 80–100)
MONOCYTES # BLD AUTO: 0.9 K/UL — SIGNIFICANT CHANGE UP (ref 0–0.9)
MONOCYTES NFR BLD AUTO: 16.2 % — HIGH (ref 2–14)
NEUTROPHILS # BLD AUTO: 2.7 K/UL — SIGNIFICANT CHANGE UP (ref 1.8–7.4)
NEUTROPHILS NFR BLD AUTO: 49.1 % — SIGNIFICANT CHANGE UP (ref 43–77)
PLAT MORPH BLD: NORMAL — SIGNIFICANT CHANGE UP
PLATELET # BLD AUTO: 163 K/UL — SIGNIFICANT CHANGE UP (ref 150–400)
POTASSIUM SERPL-MCNC: 3.7 MMOL/L — SIGNIFICANT CHANGE UP (ref 3.5–5.3)
POTASSIUM SERPL-SCNC: 3.7 MMOL/L — SIGNIFICANT CHANGE UP (ref 3.5–5.3)
PROT SERPL-MCNC: 7 G/DL — SIGNIFICANT CHANGE UP (ref 6–8.3)
RBC # BLD: 3.89 M/UL — SIGNIFICANT CHANGE UP (ref 3.8–5.2)
RBC # FLD: 13.4 % — SIGNIFICANT CHANGE UP (ref 10.3–14.5)
RBC BLD AUTO: SIGNIFICANT CHANGE UP
SODIUM SERPL-SCNC: 139 MMOL/L — SIGNIFICANT CHANGE UP (ref 135–145)
WBC # BLD: 5.4 K/UL — SIGNIFICANT CHANGE UP (ref 3.8–10.5)
WBC # FLD AUTO: 5.4 K/UL — SIGNIFICANT CHANGE UP (ref 3.8–10.5)

## 2018-01-09 PROCEDURE — 99233 SBSQ HOSP IP/OBS HIGH 50: CPT

## 2018-01-09 PROCEDURE — 70450 CT HEAD/BRAIN W/O DYE: CPT | Mod: 26

## 2018-01-09 PROCEDURE — 70551 MRI BRAIN STEM W/O DYE: CPT | Mod: 26

## 2018-01-09 RX ORDER — POLYETHYLENE GLYCOL 3350 17 G/17G
17 POWDER, FOR SOLUTION ORAL EVERY 12 HOURS
Qty: 0 | Refills: 0 | Status: DISCONTINUED | OUTPATIENT
Start: 2018-01-09 | End: 2018-01-13

## 2018-01-09 RX ORDER — DEXTROSE 50 % IN WATER 50 %
12.5 SYRINGE (ML) INTRAVENOUS ONCE
Qty: 0 | Refills: 0 | Status: COMPLETED | OUTPATIENT
Start: 2018-01-09 | End: 2018-01-09

## 2018-01-09 RX ORDER — POTASSIUM CHLORIDE 20 MEQ
20 PACKET (EA) ORAL ONCE
Qty: 0 | Refills: 0 | Status: COMPLETED | OUTPATIENT
Start: 2018-01-09 | End: 2018-01-09

## 2018-01-09 RX ADMIN — SENNA PLUS 2 TABLET(S): 8.6 TABLET ORAL at 21:57

## 2018-01-09 RX ADMIN — Medication 20 MILLIGRAM(S): at 17:55

## 2018-01-09 RX ADMIN — Medication 2: at 09:08

## 2018-01-09 RX ADMIN — ENOXAPARIN SODIUM 40 MILLIGRAM(S): 100 INJECTION SUBCUTANEOUS at 17:55

## 2018-01-09 RX ADMIN — LOSARTAN POTASSIUM 100 MILLIGRAM(S): 100 TABLET, FILM COATED ORAL at 05:23

## 2018-01-09 RX ADMIN — AMLODIPINE BESYLATE 10 MILLIGRAM(S): 2.5 TABLET ORAL at 05:23

## 2018-01-09 RX ADMIN — Medication 25 UNIT(S): at 09:08

## 2018-01-09 RX ADMIN — Medication 50 MILLIGRAM(S): at 05:22

## 2018-01-09 RX ADMIN — Medication 20 MILLIGRAM(S): at 05:23

## 2018-01-09 RX ADMIN — PANTOPRAZOLE SODIUM 40 MILLIGRAM(S): 20 TABLET, DELAYED RELEASE ORAL at 05:22

## 2018-01-09 RX ADMIN — Medication 20 MILLIEQUIVALENT(S): at 13:11

## 2018-01-09 RX ADMIN — LACOSAMIDE 150 MILLIGRAM(S): 50 TABLET ORAL at 05:23

## 2018-01-09 RX ADMIN — LACOSAMIDE 150 MILLIGRAM(S): 50 TABLET ORAL at 17:55

## 2018-01-09 RX ADMIN — Medication 12.5 GRAM(S): at 17:34

## 2018-01-09 NOTE — PROGRESS NOTE ADULT - SUBJECTIVE AND OBJECTIVE BOX
Patient is a 89y old  Female who presents with a chief complaint of left sided chronic SDH (2018 23:56)      SUBJECTIVE / OVERNIGHT EVENTS: Pt more awake, following commands, does not appear to be in pain.     MEDICATIONS  (STANDING):  amLODIPine   Tablet 10 milliGRAM(s) Oral daily  dextrose 5%. 1000 milliLiter(s) (50 mL/Hr) IV Continuous <Continuous>  dextrose 50% Injectable 12.5 Gram(s) IV Push once  dextrose 50% Injectable 25 Gram(s) IV Push once  dextrose 50% Injectable 25 Gram(s) IV Push once  docusate sodium 100 milliGRAM(s) Oral daily  enoxaparin Injectable 40 milliGRAM(s) SubCutaneous <User Schedule>  furosemide    Tablet 20 milliGRAM(s) Oral every 12 hours  insulin lispro (HumaLOG) corrective regimen sliding scale   SubCutaneous Before meals and at bedtime  insulin NPH/regular 70/30 Injectable 25 Unit(s) SubCutaneous before breakfast  insulin NPH/regular 70/30 Injectable 6 Unit(s) SubCutaneous before dinner  lacosamide 150 milliGRAM(s) Oral two times a day  losartan 100 milliGRAM(s) Oral daily  metoprolol succinate ER 50 milliGRAM(s) Oral daily  potassium chloride    Tablet ER 20 milliEquivalent(s) Oral once  senna 2 Tablet(s) Oral at bedtime    MEDICATIONS  (PRN):  dextrose Gel 1 Dose(s) Oral once PRN Blood Glucose LESS THAN 70 milliGRAM(s)/deciliter  glucagon  Injectable 1 milliGRAM(s) IntraMuscular once PRN Glucose LESS THAN 70 milligrams/deciliter  hydrALAZINE Injectable 5 milliGRAM(s) IV Push every 3 hours PRN HTN  traMADol 25 milliGRAM(s) Oral every 4 hours PRN Moderate Pain (4 - 6)      Vital Signs Last 24 Hrs  T(C): 37.1 (2018 07:30), Max: 37.6 (2018 15:29)  T(F): 98.8 (2018 07:30), Max: 99.6 (2018 15:29)  HR: 66 (2018 07:30) (63 - 85)  BP: 165/74 (2018 07:30) (123/55 - 168/68)  BP(mean): --  RR: 18 (2018 07:30) (18 - 20)  SpO2: 100% (2018 07:30) (98% - 100%)  CAPILLARY BLOOD GLUCOSE      POCT Blood Glucose.: 174 mg/dL (2018 08:39)  POCT Blood Glucose.: 176 mg/dL (2018 21:20)  POCT Blood Glucose.: 171 mg/dL (2018 17:23)  POCT Blood Glucose.: 151 mg/dL (2018 12:42)    I&O's Summary    2018 07:01  -  2018 07:00  --------------------------------------------------------  IN: 120 mL / OUT: 0 mL / NET: 120 mL        PHYSICAL EXAM:  GENERAL: NAD   HEAD:  Atraumatic, Normocephalic  EYES: conjunctiva and sclera clear  NECK: Supple, No JVD  CHEST/LUNG: Clear to auscultation bilaterally; No wheeze  HEART: Regular rate and rhythm; +SM.   ABDOMEN: Soft, Nontender, Nondistended; Bowel sounds present  EXTREMITIES:  2+ Peripheral Pulses, No clubbing, cyanosis, or edema  NEUROLOGY: Awake and alert, aphasic, follows commands.   SKIN: No rashes or lesions    LABS:                        12.2   5.4   )-----------( 163      ( 2018 05:23 )             36.4     01-09    139  |  99  |  16  ----------------------------<  147<H>  3.7   |  30  |  0.76    Ca    10.1      2018 05:23    TPro  7.0  /  Alb  3.2<L>  /  TBili  0.6  /  DBili  x   /  AST  16  /  ALT  11  /  AlkPhos  86  01-09          Urinalysis Basic - ( 2018 07:03 )    Color: Yellow / Appearance: SL Turbid / S.013 / pH: x  Gluc: x / Ketone: Trace  / Bili: Negative / Urobili: Negative   Blood: x / Protein: 30 mg/dL / Nitrite: Negative   Leuk Esterase: Negative / RBC: x / WBC 2-5 /HPF   Sq Epi: x / Non Sq Epi: Occasional /HPF / Bacteria: Few /HPF        RADIOLOGY & ADDITIONAL TESTS:    Imaging Personally Reviewed:    Consultant(s) Notes Reviewed:      Care Discussed with Consultants/Other Providers:

## 2018-01-09 NOTE — CHART NOTE - NSCHARTNOTEFT_GEN_A_CORE
patient hypoglycemic.  1 amp of d50 given, monitor fs until above 100.  hold nph as patient is not eating well.
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
## 2018-01-09 NOTE — PROGRESS NOTE ADULT - SUBJECTIVE AND OBJECTIVE BOX
SUBJECTIVE: patient improved slightly per daughter who is at bedside.  much more alert and fc but still has impaired speech.    Vital Signs Last 24 Hrs  T(C): 37.1 (2018 07:30), Max: 37.6 (2018 15:29)  T(F): 98.8 (2018 07:30), Max: 99.6 (2018 15:29)  HR: 66 (2018 07:30) (63 - 85)  BP: 165/74 (2018 07:30) (123/55 - 168/68)  BP(mean): --  RR: 18 (2018 07:30) (18 - 20)  SpO2: 100% (2018 07:30) (98% - 100%)    PHYSICAL EXAM:    Constitutional: No Acute Distress     Neurological: AOx1, garbled speech, fc on all 4, right 4/5 left 5/5 with right facial                                                   Pulmonary: Clear to Auscultation, No rales, No rhonchi, No wheezes     Cardiovascular: S1, S2, Regular rate and rhythm     Gastrointestinal: Soft, Non-tender, Non-distended     Extremities: No calf tenderness     Incision: c/d/i   LABS:                        12.2   5.4   )-----------( 163      ( 2018 05:23 )             36.4        139  |  99  |  16  ----------------------------<  147<H>  3.7   |  30  |  0.76    Ca    10.1      2018 05:23    TPro  7.0  /  Alb  3.2<L>  /  TBili  0.6  /  DBili  x   /  AST  16  /  ALT  11  /  AlkPhos  86    Hemoglobin A1C, Whole Blood: 6.2 % (18 @ 07:22)       @ 07:01  -   @ 07:00  --------------------------------------------------------  IN: 120 mL / OUT: 0 mL / NET: 120 mL      DRAINS:     MEDICATIONS:  Anticoagulation:   enoxaparin Injectable 40 milliGRAM(s) SubCutaneous <User Schedule>    Antibiotics:    Endo:  dextrose 50% Injectable 12.5 Gram(s) IV Push once  dextrose 50% Injectable 25 Gram(s) IV Push once  dextrose 50% Injectable 25 Gram(s) IV Push once  dextrose Gel 1 Dose(s) Oral once PRN  glucagon  Injectable 1 milliGRAM(s) IntraMuscular once PRN  insulin lispro (HumaLOG) corrective regimen sliding scale   SubCutaneous Before meals and at bedtime  insulin NPH/regular 70/30 Injectable 25 Unit(s) SubCutaneous before breakfast  insulin NPH/regular 70/30 Injectable 6 Unit(s) SubCutaneous before dinner    Neuro:  lacosamide 150 milliGRAM(s) Oral two times a day  traMADol 25 milliGRAM(s) Oral every 4 hours PRN Moderate Pain (4 - 6)    Cardiac:  amLODIPine   Tablet 10 milliGRAM(s) Oral daily  furosemide    Tablet 20 milliGRAM(s) Oral every 12 hours  hydrALAZINE Injectable 5 milliGRAM(s) IV Push every 3 hours PRN HTN  losartan 100 milliGRAM(s) Oral daily  metoprolol succinate ER 50 milliGRAM(s) Oral daily    Pulm:    GI/:  docusate sodium 100 milliGRAM(s) Oral daily  senna 2 Tablet(s) Oral at bedtime    Other:   dextrose 5%. 1000 milliLiter(s) IV Continuous <Continuous>  potassium chloride    Tablet ER 20 milliEquivalent(s) Oral once    DIET: ccd with snack     IMAGIN/8 le doppler negative

## 2018-01-10 DIAGNOSIS — R13.10 DYSPHAGIA, UNSPECIFIED: ICD-10-CM

## 2018-01-10 LAB
ALBUMIN SERPL ELPH-MCNC: 3.6 G/DL — SIGNIFICANT CHANGE UP (ref 3.3–5)
ALP SERPL-CCNC: 94 U/L — SIGNIFICANT CHANGE UP (ref 40–120)
ALT FLD-CCNC: 10 U/L RC — SIGNIFICANT CHANGE UP (ref 10–45)
ANION GAP SERPL CALC-SCNC: 14 MMOL/L — SIGNIFICANT CHANGE UP (ref 5–17)
AST SERPL-CCNC: 18 U/L — SIGNIFICANT CHANGE UP (ref 10–40)
BILIRUB SERPL-MCNC: 0.5 MG/DL — SIGNIFICANT CHANGE UP (ref 0.2–1.2)
BUN SERPL-MCNC: 14 MG/DL — SIGNIFICANT CHANGE UP (ref 7–23)
CALCIUM SERPL-MCNC: 10.5 MG/DL — SIGNIFICANT CHANGE UP (ref 8.4–10.5)
CHLORIDE SERPL-SCNC: 97 MMOL/L — SIGNIFICANT CHANGE UP (ref 96–108)
CO2 SERPL-SCNC: 28 MMOL/L — SIGNIFICANT CHANGE UP (ref 22–31)
CREAT SERPL-MCNC: 0.75 MG/DL — SIGNIFICANT CHANGE UP (ref 0.5–1.3)
GLUCOSE BLDC GLUCOMTR-MCNC: 181 MG/DL — HIGH (ref 70–99)
GLUCOSE BLDC GLUCOMTR-MCNC: 207 MG/DL — HIGH (ref 70–99)
GLUCOSE BLDC GLUCOMTR-MCNC: 215 MG/DL — HIGH (ref 70–99)
GLUCOSE BLDC GLUCOMTR-MCNC: 217 MG/DL — HIGH (ref 70–99)
GLUCOSE SERPL-MCNC: 156 MG/DL — HIGH (ref 70–99)
HCT VFR BLD CALC: 38.6 % — SIGNIFICANT CHANGE UP (ref 34.5–45)
HGB BLD-MCNC: 12.8 G/DL — SIGNIFICANT CHANGE UP (ref 11.5–15.5)
MCHC RBC-ENTMCNC: 30.7 PG — SIGNIFICANT CHANGE UP (ref 27–34)
MCHC RBC-ENTMCNC: 33.1 GM/DL — SIGNIFICANT CHANGE UP (ref 32–36)
MCV RBC AUTO: 93 FL — SIGNIFICANT CHANGE UP (ref 80–100)
PLATELET # BLD AUTO: 167 K/UL — SIGNIFICANT CHANGE UP (ref 150–400)
POTASSIUM SERPL-MCNC: 4.2 MMOL/L — SIGNIFICANT CHANGE UP (ref 3.5–5.3)
POTASSIUM SERPL-SCNC: 4.2 MMOL/L — SIGNIFICANT CHANGE UP (ref 3.5–5.3)
PROT SERPL-MCNC: 7.8 G/DL — SIGNIFICANT CHANGE UP (ref 6–8.3)
RBC # BLD: 4.15 M/UL — SIGNIFICANT CHANGE UP (ref 3.8–5.2)
RBC # FLD: 13.4 % — SIGNIFICANT CHANGE UP (ref 10.3–14.5)
SODIUM SERPL-SCNC: 139 MMOL/L — SIGNIFICANT CHANGE UP (ref 135–145)
TSH SERPL-MCNC: 1.3 UIU/ML — SIGNIFICANT CHANGE UP (ref 0.27–4.2)
WBC # BLD: 5.9 K/UL — SIGNIFICANT CHANGE UP (ref 3.8–10.5)
WBC # FLD AUTO: 5.9 K/UL — SIGNIFICANT CHANGE UP (ref 3.8–10.5)

## 2018-01-10 PROCEDURE — 95951: CPT | Mod: 26

## 2018-01-10 PROCEDURE — 99233 SBSQ HOSP IP/OBS HIGH 50: CPT

## 2018-01-10 RX ADMIN — Medication 4: at 13:55

## 2018-01-10 RX ADMIN — Medication 4: at 22:45

## 2018-01-10 RX ADMIN — LOSARTAN POTASSIUM 100 MILLIGRAM(S): 100 TABLET, FILM COATED ORAL at 05:00

## 2018-01-10 RX ADMIN — Medication 20 MILLIGRAM(S): at 17:24

## 2018-01-10 RX ADMIN — LACOSAMIDE 150 MILLIGRAM(S): 50 TABLET ORAL at 05:00

## 2018-01-10 RX ADMIN — LACOSAMIDE 150 MILLIGRAM(S): 50 TABLET ORAL at 17:24

## 2018-01-10 RX ADMIN — Medication 2: at 09:06

## 2018-01-10 RX ADMIN — Medication 20 MILLIGRAM(S): at 05:01

## 2018-01-10 RX ADMIN — POLYETHYLENE GLYCOL 3350 17 GRAM(S): 17 POWDER, FOR SOLUTION ORAL at 05:07

## 2018-01-10 RX ADMIN — Medication 4: at 17:23

## 2018-01-10 RX ADMIN — AMLODIPINE BESYLATE 10 MILLIGRAM(S): 2.5 TABLET ORAL at 05:01

## 2018-01-10 RX ADMIN — Medication 50 MILLIGRAM(S): at 05:00

## 2018-01-10 NOTE — PROGRESS NOTE ADULT - SUBJECTIVE AND OBJECTIVE BOX
CHIEF COMPLAINT: patient in NAD, much more alert, conversed with family earlier on phone, more verbal today however garbled speech  following some simple commands and grossly BERGER with right side weakness.     Vital Signs Last 24 Hrs  T(C): 37.5 (10 Chuck 2018 07:50), Max: 37.7 (2018 23:08)  T(F): 99.5 (10 Chuck 2018 07:50), Max: 99.8 (2018 23:08)  HR: 88 (10 Chuck 2018 07:50) (69 - 88)  BP: 144/67 (10 Chuck 2018 07:50) (137/71 - 169/77)  BP(mean): --  RR: 20 (10 Chuck 2018 07:50) (18 - 20)  SpO2: 100% (10 Chuck 2018 07:50) (99% - 100%)      PHYSICAL EXAM:    General: No Acute Distress     Neurological: patient in NAD, much more alert, tells me her name but nothing else, conversed with family earlier on phone per son at bedside; more verbal today however garbled speech  following some simple commands and grossly BERGER with right side weakness; left side 5/5 +right facial droop    Pulmonary: Clear to Auscultation, No Rales, No Rhonchi, No Wheezes     Cardiovascular: S1, S2, Regular Rate and Rhythm     Gastrointestinal: Soft, Nontender, Nondistended     Extremities: No calf tenderness     Incision: +staples c/d/i, thick bloody scab over incision    LABS:                                    12.8   5.9   )-----------( 167      ( 10 Chuck 2018 06:14 )             38.6   01-10    139  |  97  |  14  ----------------------------<  156<H>  4.2   |  28  |  0.75    Ca    10.5      10 Chuck 2018 06:14    TPro  7.8  /  Alb  3.6  /  TBili  0.5  /  DBili  x   /  AST  18  /  ALT  10  /  AlkPhos  94  01-10      MEDICATIONS  (STANDING):  amLODIPine   Tablet 10 milliGRAM(s) Oral daily  dextrose 5%. 1000 milliLiter(s) (50 mL/Hr) IV Continuous <Continuous>  dextrose 50% Injectable 12.5 Gram(s) IV Push once  dextrose 50% Injectable 25 Gram(s) IV Push once  dextrose 50% Injectable 25 Gram(s) IV Push once  docusate sodium 100 milliGRAM(s) Oral daily  enoxaparin Injectable 40 milliGRAM(s) SubCutaneous <User Schedule>  furosemide    Tablet 20 milliGRAM(s) Oral every 12 hours  insulin lispro (HumaLOG) corrective regimen sliding scale   SubCutaneous Before meals and at bedtime  lacosamide 150 milliGRAM(s) Oral two times a day  losartan 100 milliGRAM(s) Oral daily  metoprolol succinate ER 50 milliGRAM(s) Oral daily  polyethylene glycol 3350 17 Gram(s) Oral every 12 hours  senna 2 Tablet(s) Oral at bedtime    MEDICATIONS  (PRN):  dextrose Gel 1 Dose(s) Oral once PRN Blood Glucose LESS THAN 70 milliGRAM(s)/deciliter  glucagon  Injectable 1 milliGRAM(s) IntraMuscular once PRN Glucose LESS THAN 70 milligrams/deciliter  hydrALAZINE Injectable 5 milliGRAM(s) IV Push every 3 hours PRN HTN  traMADol 25 milliGRAM(s) Oral every 4 hours PRN Moderate Pain (4 - 6)      DIET: [] Regular [x] CCD [] Renal [] Puree [] Dysphagia [] Tube Feeds:     IMAGIN< from: MR Head No Cont (18 @ 20:17) >  No evidence of acute or subacute infarction.  Redemonstrated left-sided holohemispheric subdural hematoma measuring 1.2   cm in greatest depth, unchanged from most recent prior head CT.  Additional small subdural hemorrhages including adjacent to the right   occipital lobe measuring 0.4 cm in greatest depth, and in the   interhemispheric fissure measuring 0.7 cm in greatest depth. There is a   rightward midline shift measuring 0.6 cm, unchanged from most recent   prior head CT.  < end of copied text >    2 < from: VA Duplex Lower Ext Vein Scan, Bilat (18 @ 10:10) >  No evidence of bilateral lower extremity deep venous thrombosis.  < end of copied text >\    3< from: Xray Chest 1 View AP- PORTABLE-Urgent (18 @ 22:38) >     Cardiomegaly  < end of copied text >    4< from: CT Head No Cont (18 @ 16:56) >    Residual subdural is again seen and unchanged when underlying     for differences in technique..  < end of copied text >

## 2018-01-10 NOTE — PROGRESS NOTE ADULT - SUBJECTIVE AND OBJECTIVE BOX
Patient seen and examined this AM'    At that time awake, alert, expressive aphasia   FC  PERRL  L facial  BERGER well, no drift    Later in the day more conversant w/ garbled speech    No infarct on MRI. Some acute hemorrhage, but small. SDH is stable. Aphasia may be related to seizures.   -will hold DVT ppx tonight and resume tomorrow given small acute component   -24 hour EEG   -Continue vimpat.

## 2018-01-10 NOTE — PROGRESS NOTE ADULT - PROBLEM SELECTOR PLAN 8
- ppx: continue lovenox per neurosurgery  - diet: resumed consistent carb w evening snack

## 2018-01-10 NOTE — PROGRESS NOTE ADULT - SUBJECTIVE AND OBJECTIVE BOX
Patient is a 89y old  Female who presents with a chief complaint of left sided chronic SDH (03 Jan 2018 23:56)      SUBJECTIVE / OVERNIGHT EVENTS: No new events.     MEDICATIONS  (STANDING):  amLODIPine   Tablet 10 milliGRAM(s) Oral daily  dextrose 5%. 1000 milliLiter(s) (50 mL/Hr) IV Continuous <Continuous>  dextrose 50% Injectable 12.5 Gram(s) IV Push once  dextrose 50% Injectable 25 Gram(s) IV Push once  dextrose 50% Injectable 25 Gram(s) IV Push once  docusate sodium 100 milliGRAM(s) Oral daily  enoxaparin Injectable 40 milliGRAM(s) SubCutaneous <User Schedule>  furosemide    Tablet 20 milliGRAM(s) Oral every 12 hours  insulin lispro (HumaLOG) corrective regimen sliding scale   SubCutaneous Before meals and at bedtime  lacosamide 150 milliGRAM(s) Oral two times a day  losartan 100 milliGRAM(s) Oral daily  metoprolol succinate ER 50 milliGRAM(s) Oral daily  polyethylene glycol 3350 17 Gram(s) Oral every 12 hours  senna 2 Tablet(s) Oral at bedtime    MEDICATIONS  (PRN):  dextrose Gel 1 Dose(s) Oral once PRN Blood Glucose LESS THAN 70 milliGRAM(s)/deciliter  glucagon  Injectable 1 milliGRAM(s) IntraMuscular once PRN Glucose LESS THAN 70 milligrams/deciliter  hydrALAZINE Injectable 5 milliGRAM(s) IV Push every 3 hours PRN HTN  traMADol 25 milliGRAM(s) Oral every 4 hours PRN Moderate Pain (4 - 6)      Vital Signs Last 24 Hrs  T(C): 36.8 (10 Chuck 2018 11:54), Max: 37.7 (09 Jan 2018 23:08)  T(F): 98.2 (10 Chuck 2018 11:54), Max: 99.8 (09 Jan 2018 23:08)  HR: 79 (10 Chuck 2018 11:54) (69 - 88)  BP: 143/76 (10 Chuck 2018 11:54) (137/71 - 169/77)  BP(mean): --  RR: 20 (10 Chuck 2018 11:54) (18 - 20)  SpO2: 98% (10 Chuck 2018 11:54) (98% - 100%)  CAPILLARY BLOOD GLUCOSE      POCT Blood Glucose.: 215 mg/dL (10 Chuck 2018 13:01)  POCT Blood Glucose.: 181 mg/dL (10 Chuck 2018 08:27)  POCT Blood Glucose.: 121 mg/dL (09 Jan 2018 21:26)  POCT Blood Glucose.: 138 mg/dL (09 Jan 2018 17:42)  POCT Blood Glucose.: 48 mg/dL (09 Jan 2018 17:27)  POCT Blood Glucose.: 52 mg/dL (09 Jan 2018 17:26)    I&O's Summary    09 Jan 2018 07:01  -  10 Chuck 2018 07:00  --------------------------------------------------------  IN: 660 mL / OUT: 950 mL / NET: -290 mL        PHYSICAL EXAM:  GENERAL: NAD   HEAD:  Atraumatic, Normocephalic  EYES: conjunctiva and sclera clear  NECK: Supple, No JVD  CHEST/LUNG: Clear to auscultation bilaterally; No wheeze  HEART: Regular rate and rhythm; +SM.   ABDOMEN: Soft, Nontender, Nondistended; Bowel sounds present  EXTREMITIES:  2+ Peripheral Pulses, No clubbing, cyanosis, or edema  NEUROLOGY: Awake and alert, aphasic, follows commands.   SKIN: No rashes or lesions      LABS:                        12.8   5.9   )-----------( 167      ( 10 Chuck 2018 06:14 )             38.6     01-10    139  |  97  |  14  ----------------------------<  156<H>  4.2   |  28  |  0.75    Ca    10.5      10 Chuck 2018 06:14    TPro  7.8  /  Alb  3.6  /  TBili  0.5  /  DBili  x   /  AST  18  /  ALT  10  /  AlkPhos  94  01-10              RADIOLOGY & ADDITIONAL TESTS:    Imaging Personally Reviewed:  MRI head:   No evidence of acute or subacute infarction.    Redemonstrated left-sided holohemispheric subdural hematoma measuring 1.2   cm in greatest depth, unchanged from most recent prior head CT.    Additional small subdural hemorrhages including adjacent to the right   occipital lobe measuring 0.4 cm in greatest depth, and in the   interhemispheric fissure measuring 0.7 cm in greatest depth. There is a   rightward midline shift measuring 0.6 cm, unchanged from most recent   prior head CT.    Consultant(s) Notes Reviewed:      Care Discussed with Consultants/Other Providers:
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
## 2018-01-11 LAB
GLUCOSE BLDC GLUCOMTR-MCNC: 175 MG/DL — HIGH (ref 70–99)
GLUCOSE BLDC GLUCOMTR-MCNC: 177 MG/DL — HIGH (ref 70–99)
GLUCOSE BLDC GLUCOMTR-MCNC: 192 MG/DL — HIGH (ref 70–99)
GLUCOSE BLDC GLUCOMTR-MCNC: 261 MG/DL — HIGH (ref 70–99)
GLUCOSE BLDC GLUCOMTR-MCNC: 283 MG/DL — HIGH (ref 70–99)

## 2018-01-11 PROCEDURE — 99233 SBSQ HOSP IP/OBS HIGH 50: CPT

## 2018-01-11 PROCEDURE — 95951: CPT | Mod: 26

## 2018-01-11 RX ORDER — INSULIN LISPRO 100/ML
VIAL (ML) SUBCUTANEOUS AT BEDTIME
Qty: 0 | Refills: 0 | Status: DISCONTINUED | OUTPATIENT
Start: 2018-01-11 | End: 2018-01-13

## 2018-01-11 RX ORDER — INSULIN NPH HUM/REG INSULIN HM 70-30/ML
3 VIAL (ML) SUBCUTANEOUS
Qty: 0 | Refills: 0 | Status: DISCONTINUED | OUTPATIENT
Start: 2018-01-11 | End: 2018-01-12

## 2018-01-11 RX ORDER — ENOXAPARIN SODIUM 100 MG/ML
40 INJECTION SUBCUTANEOUS
Qty: 0 | Refills: 0 | Status: DISCONTINUED | OUTPATIENT
Start: 2018-01-11 | End: 2018-01-13

## 2018-01-11 RX ADMIN — AMLODIPINE BESYLATE 10 MILLIGRAM(S): 2.5 TABLET ORAL at 06:31

## 2018-01-11 RX ADMIN — Medication 100 MILLIGRAM(S): at 14:09

## 2018-01-11 RX ADMIN — Medication 50 MILLIGRAM(S): at 06:30

## 2018-01-11 RX ADMIN — Medication 20 MILLIGRAM(S): at 18:05

## 2018-01-11 RX ADMIN — Medication 6: at 18:06

## 2018-01-11 RX ADMIN — Medication 6: at 14:07

## 2018-01-11 RX ADMIN — Medication 20 MILLIGRAM(S): at 06:31

## 2018-01-11 RX ADMIN — ENOXAPARIN SODIUM 40 MILLIGRAM(S): 100 INJECTION SUBCUTANEOUS at 18:09

## 2018-01-11 RX ADMIN — LOSARTAN POTASSIUM 100 MILLIGRAM(S): 100 TABLET, FILM COATED ORAL at 06:31

## 2018-01-11 RX ADMIN — LACOSAMIDE 150 MILLIGRAM(S): 50 TABLET ORAL at 06:31

## 2018-01-11 RX ADMIN — LACOSAMIDE 150 MILLIGRAM(S): 50 TABLET ORAL at 18:09

## 2018-01-11 RX ADMIN — Medication 3 UNIT(S): at 18:05

## 2018-01-11 RX ADMIN — Medication 2: at 09:14

## 2018-01-11 RX ADMIN — POLYETHYLENE GLYCOL 3350 17 GRAM(S): 17 POWDER, FOR SOLUTION ORAL at 18:11

## 2018-01-11 NOTE — SPEECH LANGUAGE PATHOLOGY EVALUATION - SLP PATIENT/FAMILY GOALS STATEMENT
hx cont 1/9/17 MRI: IMPRESSION:  No evidence of acute or subacute infarction.  Redemonstrated left-sided holohemispheric subdural hematoma measuring 1.2 cm in greatest depth, unchanged from most recent prior head CT.  Additional small subdural hemorrhages including adjacent to the right occipital lobe measuring 0.4 cm in greatest depth, and in the interhemispheric fissure measuring 0.7 cm in greatest depth. There is a rightward midline shift measuring 0.6 cm, unchanged from most recent prior head CT.

## 2018-01-11 NOTE — PROGRESS NOTE ADULT - SUBJECTIVE AND OBJECTIVE BOX
Patient is a 89y old  Female who presents with a chief complaint of left sided chronic SDH (03 Jan 2018 23:56)      SUBJECTIVE / OVERNIGHT EVENTS: No new events.     MEDICATIONS  (STANDING):  amLODIPine   Tablet 10 milliGRAM(s) Oral daily  dextrose 5%. 1000 milliLiter(s) (50 mL/Hr) IV Continuous <Continuous>  dextrose 50% Injectable 12.5 Gram(s) IV Push once  dextrose 50% Injectable 25 Gram(s) IV Push once  dextrose 50% Injectable 25 Gram(s) IV Push once  docusate sodium 100 milliGRAM(s) Oral daily  furosemide    Tablet 20 milliGRAM(s) Oral every 12 hours  insulin lispro (HumaLOG) corrective regimen sliding scale   SubCutaneous Before meals and at bedtime  lacosamide 150 milliGRAM(s) Oral two times a day  losartan 100 milliGRAM(s) Oral daily  metoprolol succinate ER 50 milliGRAM(s) Oral daily  polyethylene glycol 3350 17 Gram(s) Oral every 12 hours  senna 2 Tablet(s) Oral at bedtime    MEDICATIONS  (PRN):  dextrose Gel 1 Dose(s) Oral once PRN Blood Glucose LESS THAN 70 milliGRAM(s)/deciliter  glucagon  Injectable 1 milliGRAM(s) IntraMuscular once PRN Glucose LESS THAN 70 milligrams/deciliter  hydrALAZINE Injectable 5 milliGRAM(s) IV Push every 3 hours PRN HTN  traMADol 25 milliGRAM(s) Oral every 4 hours PRN Moderate Pain (4 - 6)      Vital Signs Last 24 Hrs  T(C): 37.3 (11 Jan 2018 07:32), Max: 37.9 (11 Jan 2018 04:22)  T(F): 99.1 (11 Jan 2018 07:32), Max: 100.2 (11 Jan 2018 04:22)  HR: 65 (11 Jan 2018 07:32) (51 - 83)  BP: 138/70 (11 Jan 2018 07:32) (137/71 - 164/72)  BP(mean): --  RR: 20 (11 Jan 2018 07:32) (18 - 22)  SpO2: 99% (11 Jan 2018 07:32) (98% - 100%)  CAPILLARY BLOOD GLUCOSE      POCT Blood Glucose.: 175 mg/dL (11 Jan 2018 08:29)  POCT Blood Glucose.: 192 mg/dL (11 Jan 2018 01:05)  POCT Blood Glucose.: 217 mg/dL (10 Chuck 2018 21:54)  POCT Blood Glucose.: 207 mg/dL (10 Chuck 2018 17:13)  POCT Blood Glucose.: 215 mg/dL (10 Chuck 2018 13:01)    I&O's Summary    10 Chuck 2018 07:01  -  11 Jan 2018 07:00  --------------------------------------------------------  IN: 880 mL / OUT: 300 mL / NET: 580 mL        PHYSICAL EXAM:  GENERAL: NAD   HEAD:  Incision c/d/i   EYES: conjunctiva and sclera clear  NECK: Supple, No JVD  CHEST/LUNG: Clear to auscultation bilaterally; No wheeze  HEART: Regular rate and rhythm; +SM.   ABDOMEN: Soft, Nontender, Nondistended; Bowel sounds present  EXTREMITIES:  2+ Peripheral Pulses, No clubbing, cyanosis, or edema  NEUROLOGY: Awake and alert, aphasic, follows commands.   SKIN: No rashes or lesions    LABS:                        12.8   5.9   )-----------( 167      ( 10 Chuck 2018 06:14 )             38.6     01-10    139  |  97  |  14  ----------------------------<  156<H>  4.2   |  28  |  0.75    Ca    10.5      10 Chuck 2018 06:14    TPro  7.8  /  Alb  3.6  /  TBili  0.5  /  DBili  x   /  AST  18  /  ALT  10  /  AlkPhos  94  01-10              RADIOLOGY & ADDITIONAL TESTS:    Imaging Personally Reviewed:     Consultant(s) Notes Reviewed:      Care Discussed with Consultants/Other Providers:
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
## 2018-01-11 NOTE — CHART NOTE - NSCHARTNOTEFT_GEN_A_CORE
Pt seen for nutrition follow up on 4 Cohen. Hospital course noted, pt admitted with chronic SDH S/P left craniotomy and denia hole evacuation with drain placement (procedure on 1/4, drain removed 1/6). As per RN, patient had trouble sleeping last night and notably lethargic at this time. Pt interviewed with daughter at bedside who was able to provide additional subjective information since initial evaluation. She states that pt's UBW is around 175-178 pounds with no recent change in weight.  Daughter states patient is consuming 50-75% of meal tray, with improved intake if daughter is around to feed her. Daughter states that pt tolerating current diet consistency (pureed) well with no difficulties chewing or swallowing at this time. Daughter confirms that pt on Novolin 2x per day PTA and checks her finger sticks twice per day (AM fasting of 120 mg/dl and before dinner 180-200 mg/dl) at home. Pt denies nausea, vomiting, or constipation at this time but with 2 loose BMs today. Pt offers no food preferences at this time and daughter amenable to reviewing type 2 diabetes dietary recommendations as well as menu ordering procedure.    Source: Patient [X]    Family [X]     other [X] - Reviewed pt's medical chart, daughter at bedside, RN.    Diet :  Consistent carbohydrate (No snacks), pureed     Patient reports [ ] nausea  [ ] vomiting [ ] diarrhea [ ] constipation  [ ]chewing problems [ ] swallowing issues  [X] other: none     PO intake:  < 50% [ ] 50-75% [x]   % [ ]  other :     Source for PO intake [X] Patient [X] family [ ] chart [ ] staff [ ] other    Current Weight: 158.7 pounds (1/3, dosing). No new weights in medical chart. Weight obtained from bed scale during interview 175.3 pounds.     Pertinent Medications: MEDICATIONS  (STANDING):  amLODIPine   Tablet 10 milliGRAM(s) Oral daily  dextrose 5%. 1000 milliLiter(s) (50 mL/Hr) IV Continuous <Continuous>  dextrose 50% Injectable 12.5 Gram(s) IV Push once  dextrose 50% Injectable 25 Gram(s) IV Push once  dextrose 50% Injectable 25 Gram(s) IV Push once  docusate sodium 100 milliGRAM(s) Oral daily  furosemide    Tablet 20 milliGRAM(s) Oral every 12 hours  insulin lispro (HumaLOG) corrective regimen sliding scale   SubCutaneous Before meals and at bedtime  insulin NPH/regular 70/30 Injectable 3 Unit(s) SubCutaneous two times a day before meals  lacosamide 150 milliGRAM(s) Oral two times a day  losartan 100 milliGRAM(s) Oral daily  metoprolol succinate ER 50 milliGRAM(s) Oral daily  polyethylene glycol 3350 17 Gram(s) Oral every 12 hours  senna 2 Tablet(s) Oral at bedtime    MEDICATIONS  (PRN):  dextrose Gel 1 Dose(s) Oral once PRN Blood Glucose LESS THAN 70 milliGRAM(s)/deciliter  glucagon  Injectable 1 milliGRAM(s) IntraMuscular once PRN Glucose LESS THAN 70 milligrams/deciliter  hydrALAZINE Injectable 5 milliGRAM(s) IV Push every 3 hours PRN HTN  traMADol 25 milliGRAM(s) Oral every 4 hours PRN Moderate Pain (4 - 6)    Pertinent Labs:  Point of care testing BG (1/11): 175-192 mg/dl, (1/10) - 181-217 mg/dl, (1/9) -  mg/dl, Serum glucose 156 (1/10), HgA1C 6.2% (1/4)    Skin: No edema or pressure ulcers noted.    Estimated Needs:   [X] no change since previous assessment  [ ] recalculated:       Previous Nutrition Diagnosis:      [X] Increased Nutrient Needs       Nutrition Diagnosis is [X] ongoing  [ ] resolved [ ] not applicable - being addressed with ??   Interventions:     Recommend    [ ] Change Diet To:    [ ] Nutrition Supplement    [ ] Nutrition Support    [X] Other: Continue consistent carbohydrate (no snack), pureed diet as it remains appropriate at this time. Continue to encourage po intake and obtain/implement food preferences as provided. Pt declines oral nutrition supplements at this time but expressed interest in ordering from the alternate menu. Brief nutrition education provided on managing blood glucose levels, adherence to consistent carbohydrate diet, and importance of sufficient protein/kcal for wound healing.       Monitoring and Evaluation:     [X] PO intake [X] Tolerance to diet prescription [X] weights [X] follow up per protocol    [X] other: Pt seen for nutrition follow up on 4 Cohen. Hospital course noted, pt admitted with chronic SDH S/P left craniotomy and denia hole evacuation with drain placement (procedure on 1/4, drain removed 1/6). As per RN, patient had trouble sleeping last night and notably lethargic at this time. Pt interviewed with daughter at bedside who was able to provide additional subjective information since initial evaluation. She states that pt's UBW is around 175-178 pounds with no recent change in weight.  Daughter states patient is consuming 50-75% of meal tray, with improved intake if daughter is around to feed her. Daughter states that pt tolerating current diet consistency (pureed) well with no difficulties chewing or swallowing at this time. Daughter confirms that pt on Novolin 2x per day PTA and checks her finger sticks twice per day (AM fasting of 120 mg/dl and before dinner 180-200 mg/dl) at home. Pt denies nausea, vomiting, or constipation at this time but with 2 loose BMs today. Pt offers no food preferences at this time and daughter amenable to reviewing type 2 diabetes dietary recommendations as well as menu ordering procedure.    Source: Patient [X]    Family [X]     other [X] - Reviewed pt's medical chart, daughter at bedside, RN.    Diet :  Consistent carbohydrate (No snacks), pureed     Patient reports [ ] nausea  [ ] vomiting [ ] diarrhea [ ] constipation  [ ]chewing problems [ ] swallowing issues  [X] other: none     PO intake:  < 50% [ ] 50-75% [x]   % [ ]  other :     Source for PO intake [X] Patient [X] family [ ] chart [ ] staff [ ] other    Current Weight: 158.7 pounds (1/3, dosing). No new weights in medical chart. Weight obtained from bed scale during interview 175.3 pounds.     Pertinent Medications: MEDICATIONS  (STANDING):  amLODIPine   Tablet 10 milliGRAM(s) Oral daily  dextrose 5%. 1000 milliLiter(s) (50 mL/Hr) IV Continuous <Continuous>  dextrose 50% Injectable 12.5 Gram(s) IV Push once  dextrose 50% Injectable 25 Gram(s) IV Push once  dextrose 50% Injectable 25 Gram(s) IV Push once  docusate sodium 100 milliGRAM(s) Oral daily  furosemide    Tablet 20 milliGRAM(s) Oral every 12 hours  insulin lispro (HumaLOG) corrective regimen sliding scale   SubCutaneous Before meals and at bedtime  insulin NPH/regular 70/30 Injectable 3 Unit(s) SubCutaneous two times a day before meals  lacosamide 150 milliGRAM(s) Oral two times a day  losartan 100 milliGRAM(s) Oral daily  metoprolol succinate ER 50 milliGRAM(s) Oral daily  polyethylene glycol 3350 17 Gram(s) Oral every 12 hours  senna 2 Tablet(s) Oral at bedtime    MEDICATIONS  (PRN):  dextrose Gel 1 Dose(s) Oral once PRN Blood Glucose LESS THAN 70 milliGRAM(s)/deciliter  glucagon  Injectable 1 milliGRAM(s) IntraMuscular once PRN Glucose LESS THAN 70 milligrams/deciliter  hydrALAZINE Injectable 5 milliGRAM(s) IV Push every 3 hours PRN HTN  traMADol 25 milliGRAM(s) Oral every 4 hours PRN Moderate Pain (4 - 6)    Pertinent Labs:  Point of care testing BG (1/11): 175-192 mg/dl, (1/10) - 181-217 mg/dl, (1/9) -  mg/dl, Serum glucose 156 (1/10), HgA1C 6.2% (1/4)    Skin: No edema or pressure ulcers noted.    Estimated Needs:   [X] no change since previous assessment  [ ] recalculated:       Previous Nutrition Diagnosis:      [X] Increased Nutrient Needs       Nutrition Diagnosis is [X] ongoing  [ ] resolved [ ] not applicable   Interventions:     Recommend    [ ] Change Diet To:    [ ] Nutrition Supplement    [ ] Nutrition Support    [X] Other: Continue consistent carbohydrate (no snack), pureed diet as it remains appropriate at this time. As medically feasible, consider diet upgrade to optimize po intake. Continue to encourage po intake and obtain/implement food preferences as provided. Pt declines oral nutrition supplements at this time but expressed interest in ordering from the alternate menu. Brief verbal and written nutrition education provided on managing blood glucose levels, adherence to consistent carbohydrate diet, and importance of sufficient protein/kcal for wound healing.       Monitoring and Evaluation:     [X] PO intake [X] Tolerance to diet prescription [X] weights [X] follow up per protocol    [] other: Pt seen for nutrition follow up on 4 Cohen. Hospital course noted, pt admitted with chronic SDH S/P left craniotomy and denia hole evacuation with drain placement (procedure on 1/4, drain removed 1/6). As per RN, patient had trouble sleeping last night and notably lethargic at this time. Pt interviewed with daughter at bedside who was able to provide additional subjective information since initial evaluation. She states that pt's UBW is around 175-178 pounds with no recent change in weight.  Daughter states patient is consuming 50-75% of meal tray, with improved intake if daughter is around to feed her. Daughter states that pt tolerating current diet consistency (pureed) well with no difficulties chewing or swallowing at this time. Daughter confirms that pt on Novolin 2x per day PTA and checks her finger sticks twice per day (AM fasting of 120 mg/dl and before dinner 180-200 mg/dl) at home. Pt denies nausea, vomiting, or constipation at this time but with 2 loose BMs today. Pt offers no food preferences at this time and daughter amenable to reviewing type 2 diabetes dietary recommendations as well as menu ordering procedure.    Source: Patient [X]    Family [X]     other [X] - Reviewed pt's medical chart, daughter at bedside, RN.    Diet :  Consistent carbohydrate (No snacks), pureed     Patient reports [ ] nausea  [ ] vomiting [ ] diarrhea [ ] constipation  [ ]chewing problems [ ] swallowing issues  [X] other: none     PO intake:  < 50% [ ] 50-75% [x]   % [ ]  other :     Source for PO intake [X] Patient [X] family [ ] chart [ ] staff [ ] other    Current Weight: 158.7 pounds (1/3, dosing). No new weights in medical chart. Weight obtained from bed scale during interview 175.3 pounds.     Pertinent Medications: MEDICATIONS  (STANDING):  amLODIPine   Tablet 10 milliGRAM(s) Oral daily  dextrose 5%. 1000 milliLiter(s) (50 mL/Hr) IV Continuous <Continuous>  dextrose 50% Injectable 12.5 Gram(s) IV Push once  dextrose 50% Injectable 25 Gram(s) IV Push once  dextrose 50% Injectable 25 Gram(s) IV Push once  docusate sodium 100 milliGRAM(s) Oral daily  furosemide    Tablet 20 milliGRAM(s) Oral every 12 hours  insulin lispro (HumaLOG) corrective regimen sliding scale   SubCutaneous Before meals and at bedtime  insulin NPH/regular 70/30 Injectable 3 Unit(s) SubCutaneous two times a day before meals  lacosamide 150 milliGRAM(s) Oral two times a day  losartan 100 milliGRAM(s) Oral daily  metoprolol succinate ER 50 milliGRAM(s) Oral daily  polyethylene glycol 3350 17 Gram(s) Oral every 12 hours  senna 2 Tablet(s) Oral at bedtime    MEDICATIONS  (PRN):  dextrose Gel 1 Dose(s) Oral once PRN Blood Glucose LESS THAN 70 milliGRAM(s)/deciliter  glucagon  Injectable 1 milliGRAM(s) IntraMuscular once PRN Glucose LESS THAN 70 milligrams/deciliter  hydrALAZINE Injectable 5 milliGRAM(s) IV Push every 3 hours PRN HTN  traMADol 25 milliGRAM(s) Oral every 4 hours PRN Moderate Pain (4 - 6)    Pertinent Labs:  Point of care testing BG (1/11): 175-192 mg/dl, (1/10) - 181-217 mg/dl, (1/9) -  mg/dl, Serum glucose 156 (1/10), HgA1C 6.2% (1/4)    Skin: No edema or pressure ulcers noted.    Estimated Needs:   [X] no change since previous assessment  [ ] recalculated:       Previous Nutrition Diagnosis:      [X] Increased Nutrient Needs       Nutrition Diagnosis is [X] ongoing  [ ] resolved [ ] not applicable     New Nutrition Diagnosis: [X] Inadequate oral intake (acute) related to altered mental status, decreased appetite as evidenced by pt's daughter reports 50-75% of meals/snacks consumed.   Interventions:     Recommend    [ ] Change Diet To:    [ ] Nutrition Supplement    [ ] Nutrition Support    [X] Other: Continue consistent carbohydrate (no snack), pureed diet as it remains appropriate at this time. As medically feasible, consider diet upgrade to optimize po intake. Continue to encourage po intake and obtain/implement food preferences as provided. Pt's daughter declines oral nutrition supplements at this time but expressed interest in ordering from the alternate menu. Brief verbal and written nutrition education provided on managing blood glucose levels, adherence to consistent carbohydrate diet, and importance of sufficient protein/kcal for wound healing.       Monitoring and Evaluation:     [X] PO intake [X] Tolerance to diet prescription [X] weights [X] follow up per protocol    [] other:

## 2018-01-11 NOTE — SPEECH LANGUAGE PATHOLOGY EVALUATION - SLP PERTINENT HISTORY OF CURRENT PROBLEM
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

## 2018-01-11 NOTE — EEG REPORT - NS EEG TEXT BOX
[facility]   COMPREHENSIVE EPILEPSY CENTER   REPORT OF CONTINUOUS VIDEO EEG     [facility]: [address] [place] [address] [address], [place] [address], Ph#: [phone]  [facility]: [id number], [place] [address], Ph#: [phone]  Office: [address] [place] [address], [facility] [address], [place] [address] Ph#: [phone]    Patient Name: BRANDIE STARK  Age and : 89y ([birth year])  MRN #: [id number]  Location: [place] [address]    Study Date: 1/10/18 read of initial 20minutes recording    _____________________________________________________________  TECHNICAL INFORMATION    EEG Placement and Labeling of Electrodes:  The EEG was performed utilizing 20 channel referential EEG connections (coronal over temporal over parasagittal montage) using all standard 10-20 electrode placements with EKG, with additional electrodes placed in the inferior temporal region using the modified 10-10 montage electrode placements for elective admissions, or if deemed necessary. Recording was at a sampling rate of 256 samples per second per channel. Time synchronized digital video recording was done simultaneously with EEG recording. A low light infrared camera was used for low light recording.     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 Concern for seizure.     PERTINENT MEDICATION:  MEDICATIONS  (STANDING):  amLODIPine   Tablet 10 milliGRAM(s) Oral daily  dextrose 5%. 1000 milliLiter(s) (50 mL/Hr) IV Continuous <Continuous>  dextrose 50% Injectable 12.5 Gram(s) IV Push once  dextrose 50% Injectable 25 Gram(s) IV Push once  dextrose 50% Injectable 25 Gram(s) IV Push once  docusate sodium 100 milliGRAM(s) Oral daily  furosemide    Tablet 20 milliGRAM(s) Oral every 12 hours  insulin lispro (HumaLOG) corrective regimen sliding scale   SubCutaneous Before meals and at bedtime  lacosamide 150 milliGRAM(s) Oral two times a day  losartan 100 milliGRAM(s) Oral daily  metoprolol succinate ER 50 milliGRAM(s) Oral daily  polyethylene glycol 3350 17 Gram(s) Oral every 12 hours  senna 2 Tablet(s) Oral at bedtime    _____________________________________________________________  STUDY INTERPRETATION    Background:  The background was continuous, spontaneously variable, reactive, and predominantly consisted of theta activities.  During wakefulness, the posteriorly dominant rhythm consisted of symmetric, well-modulated 6 Hz activity, with an amplitude to 30 uV, that attenuated to eye opening.      Sleep:  Stage II sleep transients were not recorded.    Non-epileptiform Paroxysmal Abnormalities:  None    Focal Slowing:  There may be minor additional left hemispheric slowing, Further data is required.     Interictal Epileptiform Abnormalities:   None    Ictal Abnormalities:   No clinical events.  No electrographic seizures.    Artifacts:  Intermittent myogenic and movement artifacts were noted. There were several episodes when the patient aroused and started moving around. The EEG during that time was not interpretable due to artifacts. But these events were less likely seizures since the EEG following the episodes was unchanged to prior. Further data will help to evaluate.     ECG:  The heart rate on single channel ECG was predominantly between 60--70 BPM.    _____________________________________________________________  EEG CLASSIFICATION/SUMMARY:    Abnormal EEG in an drowsy/encephalopathic patient, due to:  1) Mild diffuse slowing  2) possible mild additional left hemispheric slowing (further data is needed)    _____________________________________________________________  CLINICAL IMPRESSION:    The recording in an encephalopathic / drowsy patient is consistent with:   1) Moderate diffuse or multifocal cerebral dysfunction.  2) possible signs of structural or functional cerebral abnormality over the left hemisphere. But this needs to be re-evaluated once more data is available.   No clear epileptic pattern or seizure seen. The EEG during periods of unrest/moving around could not be interpreted due to artifact.     Steven Block MD PhD  Attending Physician, [facility] Epilepsy [place]
[facility]   COMPREHENSIVE EPILEPSY CENTER   REPORT OF CONTINUOUS VIDEO EEG     [facility]: [address] [place] [address] [address], [place] [address], Ph#: [phone]  [facility]: [id number], [place] [address], Ph#: [phone]  Office: [address] [place] [address], [place] [address], [place] [address] Ph#: [phone]    Patient Name: BRANDIE STARK  Age and : 89y ([birth year])  MRN #: [id number]  Location: [address]     Study Date: 1/10/18 - 18    _____________________________________________________________  TECHNICAL INFORMATION    EEG Placement and Labeling of Electrodes:  The EEG was performed utilizing 20 channel referential EEG connections (coronal over temporal over parasagittal montage) using all standard 10-20 electrode placements with EKG, with additional electrodes placed in the inferior temporal region using the modified 10-10 montage electrode placements for elective admissions, or if deemed necessary. Recording was at a sampling rate of 256 samples per second per channel. Time synchronized digital video recording was done simultaneously with EEG recording. A low light infrared camera was used for low light recording.     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 Concern for seizure.     PERTINENT MEDICATION:  MEDICATIONS  (STANDING):  amLODIPine   Tablet 10 milliGRAM(s) Oral daily  furosemide    Tablet 20 milliGRAM(s) Oral every 12 hours  insulin lispro (HumaLOG) corrective regimen sliding scale   SubCutaneous Before meals and at bedtime  lacosamide 150 milliGRAM(s) Oral two times a day  losartan 100 milliGRAM(s) Oral daily  metoprolol succinate ER 50 milliGRAM(s) Oral daily  _____________________________________________________________  STUDY INTERPRETATION    Background:  The background was continuous, spontaneously variable, reactive, and predominantly consisted of theta activities and small delta. During wakefulness, the posteriorly dominant rhythm consisted of symmetric, well-modulated 6 Hz activity, with an amplitude to 30 uV, that attenuated to eye opening.      Sleep:  Stage II sleep transients were not recorded.    Non-epileptiform Paroxysmal Abnormalities:  None    Focal Slowing:  Frequent intermittent, polymorphic, left hemispheric theta and delta slowing, diffuse left but most prominent over fronto-central.     Interictal Epileptiform Abnormalities:   None    Ictal Abnormalities:   No clinical events.  No electrographic seizures.    Artifacts:  Intermittent myogenic and movement artifacts were noted. There were several episodes when the patient aroused and started moving around. The EEG during that time was not interpretable due to artifacts. But these events were less likely seizures since the EEG following the episodes was unchanged to prior. Further data will help to evaluate.     ECG:  The heart rate on single channel ECG was predominantly between 70-90 BPM.    _____________________________________________________________  EEG CLASSIFICATION/SUMMARY:    Abnormal EEG in an drowsy/encephalopathic patient, due to:  1) Mild diffuse slowing  2) additional mild left hemispheric slowing     _____________________________________________________________  CLINICAL IMPRESSION:    The recording in an encephalopathic / drowsy patient is consistent with:   1) Moderate diffuse or multifocal cerebral dysfunction.  2) Mild additional signs of structural or functional cerebral abnormality over the left hemisphere, fronto-central predominant.   No epileptiform activity noted.     Steven Block MD PhD  Attending Physician, [facility] Epilepsy [place]

## 2018-01-11 NOTE — SPEECH LANGUAGE PATHOLOGY EVALUATION - SLP DIAGNOSIS
Pt presents with aphasia characterized by significant speech and receptive/expressive language deficits which may be superimposed upon by encephalopathy at this time. Pt inconsistently followed one step directions and was unable to establish a consistent yes/no response.  Pt remained primarily non-verbal although she attempted verbalizations infrequently which were deemed to be unintelligible vs jargon.  Pt's daughter reports infrequent production of a true word.  Pt inconsistently demonstrated object function although she was unable to match objects in a f=2.

## 2018-01-11 NOTE — SPEECH LANGUAGE PATHOLOGY EVALUATION - SLP PROGNOSIS
hx cont: 1/9/17 CT Head: IMPRESSION: No significant interval change from 1/7/2018.  Stable left elbow hemispheric mixed density subdural hematoma measures 1 cm in greatest depth.  Stable rightward midline shift of 5 mm.  No hydrocephalus.  MD f/u:  Encephalopathy acute.  Plan: - likely metabolic due to SDH and probable seizure, mental status has significantly improved;  repeat CT head 1/7 with unchanged residual subdural hematoma.  Pt Hypoglycemic in pm.  1/10/17 EEG: The recording in an encephalopathic / drowsy patient is consistent with: 1) Moderate diffuse or multifocal cerebral dysfunction.  2) possible signs of structural or functional cerebral abnormality over the left hemisphere. But this needs to be re-evaluated once more data is available.  No clear epileptic pattern or seizure seen. The EEG during periods of unrest/moving around could not be interpreted due to artifact.

## 2018-01-11 NOTE — SPEECH LANGUAGE PATHOLOGY EVALUATION - SLP EXECUTIVE FUNCTIONS
Underlying cognitive deficits suspected; could not formally assess given underlying linguistic deficits.

## 2018-01-11 NOTE — PROGRESS NOTE ADULT - SUBJECTIVE AND OBJECTIVE BOX
HPI:  Patient is an 89 year old female with multiple medical problems who was brought to outside hospital because of six days of headaches as well as confusion.  Was discharged home and then presented to [facility] and CT head was done which showed left subdural hematoma.  Patient transferred to [facility] for further management.    OVERNIGHT EVENTS:  No acute events overnight.  Still on VEEG, neuro status stable.  Tolerating diet, being fed with assistance.  On home medications.     Vital Signs Last 24 Hrs  T(C): 37.3 (11 Jan 2018 07:32), Max: 37.9 (11 Jan 2018 04:22)  T(F): 99.1 (11 Jan 2018 07:32), Max: 100.2 (11 Jan 2018 04:22)  HR: 65 (11 Jan 2018 07:32) (51 - 83)  BP: 138/70 (11 Jan 2018 07:32) (137/71 - 164/72)  BP(mean): --  RR: 20 (11 Jan 2018 07:32) (18 - 22)  SpO2: 99% (11 Jan 2018 07:32) (98% - 100%)      PHYSICAL EXAM:  Neurological:     Cardiovascular: +s1, s2  Respiratory: clear to auscultation b/l  Gastrointestinal: soft, non-distended, non-tender  Genitourinary: +voiding  Incision/Wound:     TUBES/LINES:  [x] none    DIET:  [x] pureed, consistent carbohydrate    LABS:                        12.8   5.9   )-----------( 167      ( 10 Chuck 2018 06:14 )             38.6     01-10    139  |  97  |  14  ----------------------------<  156<H>  4.2   |  28  |  0.75    Ca    10.5      10 Chuck 2018 06:14    TPro  7.8  /  Alb  3.6  /  TBili  0.5  /  DBili  x   /  AST  18  /  ALT  10  /  AlkPhos  94  01-10        CAPILLARY BLOOD GLUCOSE      POCT Blood Glucose.: 175 mg/dL (11 Jan 2018 08:29)  POCT Blood Glucose.: 192 mg/dL (11 Jan 2018 01:05)  POCT Blood Glucose.: 217 mg/dL (10 Chuck 2018 21:54)  POCT Blood Glucose.: 207 mg/dL (10 Chuck 2018 17:13)  POCT Blood Glucose.: 215 mg/dL (10 Chuck 2018 13:01)      Allergies    No Known Allergies        MEDICATIONS:  Antibiotics: none    Neuro:  lacosamide 150 milliGRAM(s) Oral two times a day  traMADol 25 milliGRAM(s) Oral every 4 hours PRN    Anticoagulation: none    OTHER:  amLODIPine   Tablet 10 milliGRAM(s) Oral daily  dextrose 50% Injectable 12.5 Gram(s) IV Push once  dextrose 50% Injectable 25 Gram(s) IV Push once  dextrose 50% Injectable 25 Gram(s) IV Push once  dextrose Gel 1 Dose(s) Oral once PRN  docusate sodium 100 milliGRAM(s) Oral daily  furosemide    Tablet 20 milliGRAM(s) Oral every 12 hours  glucagon  Injectable 1 milliGRAM(s) IntraMuscular once PRN  hydrALAZINE Injectable 5 milliGRAM(s) IV Push every 3 hours PRN  insulin lispro (HumaLOG) corrective regimen sliding scale   SubCutaneous Before meals and at bedtime  losartan 100 milliGRAM(s) Oral daily  metoprolol succinate ER 50 milliGRAM(s) Oral daily  polyethylene glycol 3350 17 Gram(s) Oral every 12 hours  senna 2 Tablet(s) Oral at bedtime    IVF:  dextrose 5%. 1000 milliLiter(s) IV Continuous <Continuous>    CULTURES:  Culture Results:   No growth to date. (01-08 @ 06:22)  Culture Results:   No growth to date. (01-08 @ 06:22)    RADIOLOGY & ADDITIONAL TESTS:  VEEG (1/10): no seizures noted HPI:  Patient is an 89 year old female with multiple medical problems who was brought to outside hospital because of six days of headaches as well as confusion.  Was discharged home and then presented to [facility] and CT head was done which showed left subdural hematoma.  Patient transferred to [facility] for further management.    OVERNIGHT EVENTS:  No acute events overnight.  Still on VEEG, neuro status stable.  Tolerating diet, being fed with assistance.  On home medications.     Vital Signs Last 24 Hrs  T(C): 37.3 (11 Jan 2018 07:32), Max: 37.9 (11 Jan 2018 04:22)  T(F): 99.1 (11 Jan 2018 07:32), Max: 100.2 (11 Jan 2018 04:22)  HR: 65 (11 Jan 2018 07:32) (51 - 83)  BP: 138/70 (11 Jan 2018 07:32) (137/71 - 164/72)  BP(mean): --  RR: 20 (11 Jan 2018 07:32) (18 - 22)  SpO2: 99% (11 Jan 2018 07:32) (98% - 100%)      PHYSICAL EXAM:  Neurological: awake, alert, says her name, answers yes to some questions, gargled speech, right pupil surgical and reactive, left pupil 3mm and reactive, eomi, moves RUE/RLE w/ 4/5 strength throughout, moves LUE/LLE 5/5 strength throughout    Cardiovascular: +s1, s2  Respiratory: clear to auscultation b/l  Gastrointestinal: soft, non-distended, non-tender  Genitourinary: +voiding  Incision/Wound: left crani/prior drain incisions c/d/i w/ staples    TUBES/LINES:  [x] none    DIET:  [x] pureed, consistent carbohydrate    LABS:                        12.8   5.9   )-----------( 167      ( 10 Chuck 2018 06:14 )             38.6     01-10    139  |  97  |  14  ----------------------------<  156<H>  4.2   |  28  |  0.75    Ca    10.5      10 Chuck 2018 06:14    TPro  7.8  /  Alb  3.6  /  TBili  0.5  /  DBili  x   /  AST  18  /  ALT  10  /  AlkPhos  94  01-10        CAPILLARY BLOOD GLUCOSE      POCT Blood Glucose.: 175 mg/dL (11 Jan 2018 08:29)  POCT Blood Glucose.: 192 mg/dL (11 Jan 2018 01:05)  POCT Blood Glucose.: 217 mg/dL (10 Chuck 2018 21:54)  POCT Blood Glucose.: 207 mg/dL (10 Chuck 2018 17:13)  POCT Blood Glucose.: 215 mg/dL (10 Chuck 2018 13:01)      Allergies    No Known Allergies        MEDICATIONS:  Antibiotics: none    Neuro:  lacosamide 150 milliGRAM(s) Oral two times a day  traMADol 25 milliGRAM(s) Oral every 4 hours PRN    Anticoagulation: none    OTHER:  amLODIPine   Tablet 10 milliGRAM(s) Oral daily  dextrose 50% Injectable 12.5 Gram(s) IV Push once  dextrose 50% Injectable 25 Gram(s) IV Push once  dextrose 50% Injectable 25 Gram(s) IV Push once  dextrose Gel 1 Dose(s) Oral once PRN  docusate sodium 100 milliGRAM(s) Oral daily  furosemide    Tablet 20 milliGRAM(s) Oral every 12 hours  glucagon  Injectable 1 milliGRAM(s) IntraMuscular once PRN  hydrALAZINE Injectable 5 milliGRAM(s) IV Push every 3 hours PRN  insulin lispro (HumaLOG) corrective regimen sliding scale   SubCutaneous Before meals and at bedtime  losartan 100 milliGRAM(s) Oral daily  metoprolol succinate ER 50 milliGRAM(s) Oral daily  polyethylene glycol 3350 17 Gram(s) Oral every 12 hours  senna 2 Tablet(s) Oral at bedtime    IVF:  dextrose 5%. 1000 milliLiter(s) IV Continuous <Continuous>    CULTURES:  Culture Results:   No growth to date. (01-08 @ 06:22)  Culture Results:   No growth to date. (01-08 @ 06:22)    RADIOLOGY & ADDITIONAL TESTS:  VEEG (1/10): no seizures noted

## 2018-01-11 NOTE — PROGRESS NOTE ADULT - ASSESSMENT
HPI:  Patient is an 89 year old female with multiple medical problems who was brought to outside hospital because of six days of headaches as well as confusion.  Was discharged home and then presented to [facility] and CT head was done which showed left subdural hematoma.  Patient transferred to [facility] for further management.    PROCEDURE: s/p left craniotomy for evacuation of subdural hematoma on 1/4/2018  POD#7    PLAN:  -vimpat for seizure prophylaxis  -home medications  -continue VEEG, if no improvement in exam possible head CT  -low dose nph restarted, HISS for glucose control  -tramadol for pain control  -senna, colace, and miralax for bowel regimen  -hold SQL/SQH due to SDH, SCDs for DVT prophylaxis  -pureed, consistent carbohydrate diet, assistance w/ feeding  -out of bed with assistance  -pt/ot - subacute rehab upon discharge  -am labs    Spectra #[id number]

## 2018-01-12 LAB
ANION GAP SERPL CALC-SCNC: 8 MMOL/L — SIGNIFICANT CHANGE UP (ref 5–17)
BUN SERPL-MCNC: 15 MG/DL — SIGNIFICANT CHANGE UP (ref 7–23)
CALCIUM SERPL-MCNC: 9.5 MG/DL — SIGNIFICANT CHANGE UP (ref 8.4–10.5)
CHLORIDE SERPL-SCNC: 93 MMOL/L — LOW (ref 96–108)
CO2 SERPL-SCNC: 31 MMOL/L — SIGNIFICANT CHANGE UP (ref 22–31)
CREAT SERPL-MCNC: 0.75 MG/DL — SIGNIFICANT CHANGE UP (ref 0.5–1.3)
GLUCOSE BLDC GLUCOMTR-MCNC: 236 MG/DL — HIGH (ref 70–99)
GLUCOSE BLDC GLUCOMTR-MCNC: 249 MG/DL — HIGH (ref 70–99)
GLUCOSE BLDC GLUCOMTR-MCNC: 254 MG/DL — HIGH (ref 70–99)
GLUCOSE BLDC GLUCOMTR-MCNC: 256 MG/DL — HIGH (ref 70–99)
GLUCOSE SERPL-MCNC: 301 MG/DL — HIGH (ref 70–99)
HCT VFR BLD CALC: 32.5 % — LOW (ref 34.5–45)
HGB BLD-MCNC: 11.2 G/DL — LOW (ref 11.5–15.5)
MCHC RBC-ENTMCNC: 31.1 PG — SIGNIFICANT CHANGE UP (ref 27–34)
MCHC RBC-ENTMCNC: 34.3 GM/DL — SIGNIFICANT CHANGE UP (ref 32–36)
MCV RBC AUTO: 90.6 FL — SIGNIFICANT CHANGE UP (ref 80–100)
PLATELET # BLD AUTO: 184 K/UL — SIGNIFICANT CHANGE UP (ref 150–400)
POTASSIUM SERPL-MCNC: 4 MMOL/L — SIGNIFICANT CHANGE UP (ref 3.5–5.3)
POTASSIUM SERPL-SCNC: 4 MMOL/L — SIGNIFICANT CHANGE UP (ref 3.5–5.3)
RBC # BLD: 3.59 M/UL — LOW (ref 3.8–5.2)
RBC # FLD: 12.9 % — SIGNIFICANT CHANGE UP (ref 10.3–14.5)
SODIUM SERPL-SCNC: 132 MMOL/L — LOW (ref 135–145)
WBC # BLD: 5.8 K/UL — SIGNIFICANT CHANGE UP (ref 3.8–10.5)
WBC # FLD AUTO: 5.8 K/UL — SIGNIFICANT CHANGE UP (ref 3.8–10.5)

## 2018-01-12 PROCEDURE — 99233 SBSQ HOSP IP/OBS HIGH 50: CPT

## 2018-01-12 RX ORDER — HUMAN INSULIN 100 [IU]/ML
12 INJECTION, SUSPENSION SUBCUTANEOUS
Qty: 0 | Refills: 0 | Status: DISCONTINUED | OUTPATIENT
Start: 2018-01-12 | End: 2018-01-13

## 2018-01-12 RX ORDER — HUMAN INSULIN 100 [IU]/ML
4 INJECTION, SUSPENSION SUBCUTANEOUS AT BEDTIME
Qty: 0 | Refills: 0 | Status: DISCONTINUED | OUTPATIENT
Start: 2018-01-12 | End: 2018-01-13

## 2018-01-12 RX ORDER — SODIUM CHLORIDE 9 MG/ML
1 INJECTION INTRAMUSCULAR; INTRAVENOUS; SUBCUTANEOUS EVERY 12 HOURS
Qty: 0 | Refills: 0 | Status: DISCONTINUED | OUTPATIENT
Start: 2018-01-12 | End: 2018-01-12

## 2018-01-12 RX ORDER — HYDRALAZINE HCL 50 MG
5 TABLET ORAL ONCE
Qty: 0 | Refills: 0 | Status: COMPLETED | OUTPATIENT
Start: 2018-01-12 | End: 2018-01-12

## 2018-01-12 RX ORDER — SODIUM CHLORIDE 9 MG/ML
2 INJECTION INTRAMUSCULAR; INTRAVENOUS; SUBCUTANEOUS EVERY 8 HOURS
Qty: 0 | Refills: 0 | Status: DISCONTINUED | OUTPATIENT
Start: 2018-01-12 | End: 2018-01-13

## 2018-01-12 RX ADMIN — LOSARTAN POTASSIUM 100 MILLIGRAM(S): 100 TABLET, FILM COATED ORAL at 04:48

## 2018-01-12 RX ADMIN — TRAMADOL HYDROCHLORIDE 25 MILLIGRAM(S): 50 TABLET ORAL at 22:30

## 2018-01-12 RX ADMIN — SODIUM CHLORIDE 2 GRAM(S): 9 INJECTION INTRAMUSCULAR; INTRAVENOUS; SUBCUTANEOUS at 22:02

## 2018-01-12 RX ADMIN — LACOSAMIDE 150 MILLIGRAM(S): 50 TABLET ORAL at 17:34

## 2018-01-12 RX ADMIN — Medication 3 UNIT(S): at 08:31

## 2018-01-12 RX ADMIN — LACOSAMIDE 150 MILLIGRAM(S): 50 TABLET ORAL at 04:48

## 2018-01-12 RX ADMIN — Medication 1: at 21:53

## 2018-01-12 RX ADMIN — ENOXAPARIN SODIUM 40 MILLIGRAM(S): 100 INJECTION SUBCUTANEOUS at 17:31

## 2018-01-12 RX ADMIN — Medication 5 MILLIGRAM(S): at 20:48

## 2018-01-12 RX ADMIN — Medication 6: at 17:30

## 2018-01-12 RX ADMIN — Medication 20 MILLIGRAM(S): at 04:48

## 2018-01-12 RX ADMIN — Medication 4: at 13:37

## 2018-01-12 RX ADMIN — Medication 50 MILLIGRAM(S): at 04:56

## 2018-01-12 RX ADMIN — SENNA PLUS 2 TABLET(S): 8.6 TABLET ORAL at 22:03

## 2018-01-12 RX ADMIN — Medication 20 MILLIGRAM(S): at 17:31

## 2018-01-12 RX ADMIN — Medication 4: at 08:27

## 2018-01-12 RX ADMIN — HUMAN INSULIN 4 UNIT(S): 100 INJECTION, SUSPENSION SUBCUTANEOUS at 22:46

## 2018-01-12 RX ADMIN — Medication 100 MILLIGRAM(S): at 13:38

## 2018-01-12 RX ADMIN — AMLODIPINE BESYLATE 10 MILLIGRAM(S): 2.5 TABLET ORAL at 04:48

## 2018-01-12 RX ADMIN — TRAMADOL HYDROCHLORIDE 25 MILLIGRAM(S): 50 TABLET ORAL at 22:02

## 2018-01-12 NOTE — PROGRESS NOTE ADULT - SUBJECTIVE AND OBJECTIVE BOX
SUBJECTIVE: Patient seen and examined at bedside, seems to be in NAD, aid at beside and denies any acute events overnight, no new complaints.        Vital Signs Last 24 Hrs  T(C): 37 (12 Jan 2018 08:45), Max: 37.4 (11 Jan 2018 15:26)  T(F): 98.6 (12 Jan 2018 08:45), Max: 99.4 (11 Jan 2018 15:26)  HR: 60 (12 Jan 2018 08:45) (52 - 84)  BP: 162/60 (12 Jan 2018 08:45) (117/62 - 170/77)  BP(mean): --  RR: 18 (12 Jan 2018 08:45) (18 - 20)  SpO2: 97% (12 Jan 2018 08:45) (97% - 100%)    PHYSICAL EXAM:    General: No Acute Distress     Neurological:  Awake, alert, mumbles, speech garbled, follows simple commands, answers yes or no to some questions, R pupil surgical and reactive, L pupil 3mm and reactive, EOMI, BERGER to commands antigravity.    Pulmonary: Clear to Auscultation, No Rales, No Rhonchi, No Wheezes     Cardiovascular: S1, S2, Regular Rate and Rhythm     Gastrointestinal: Soft, Nontender, Nondistended     Extremities: No calf tenderness     Incision: c/d/i    LABS:         Hemoglobin A1C, Whole Blood: 6.2 % (01-04 @ 07:22)      01-11 @ 07:01  -  01-12 @ 07:00  --------------------------------------------------------  IN: 920 mL / OUT: 300 mL / NET: 620 mL      DRAINS: none    MEDICATIONS:  Antibiotics:    Neuro:  lacosamide 150 milliGRAM(s) Oral two times a day  traMADol 25 milliGRAM(s) Oral every 4 hours PRN Moderate Pain (4 - 6)    Cardiac:  amLODIPine   Tablet 10 milliGRAM(s) Oral daily  furosemide    Tablet 20 milliGRAM(s) Oral every 12 hours  hydrALAZINE Injectable 5 milliGRAM(s) IV Push every 3 hours PRN HTN  losartan 100 milliGRAM(s) Oral daily  metoprolol succinate ER 50 milliGRAM(s) Oral daily    Pulm:    GI/:  docusate sodium 100 milliGRAM(s) Oral daily  polyethylene glycol 3350 17 Gram(s) Oral every 12 hours  senna 2 Tablet(s) Oral at bedtime    Other:   dextrose 5%. 1000 milliLiter(s) IV Continuous <Continuous>  dextrose 50% Injectable 12.5 Gram(s) IV Push once  dextrose 50% Injectable 25 Gram(s) IV Push once  dextrose 50% Injectable 25 Gram(s) IV Push once  dextrose Gel 1 Dose(s) Oral once PRN Blood Glucose LESS THAN 70 milliGRAM(s)/deciliter  enoxaparin Injectable 40 milliGRAM(s) SubCutaneous <User Schedule>  glucagon  Injectable 1 milliGRAM(s) IntraMuscular once PRN Glucose LESS THAN 70 milligrams/deciliter  insulin lispro (HumaLOG) corrective regimen sliding scale   SubCutaneous at bedtime  insulin lispro (HumaLOG) corrective regimen sliding scale   SubCutaneous Before meals and at bedtime  insulin NPH/regular 70/30 Injectable 3 Unit(s) SubCutaneous two times a day before meals    DIET: [] Regular [] CCD [] Renal [x] Puree [] Dysphagia [] Tube Feeds:     IMAGING:

## 2018-01-12 NOTE — PROGRESS NOTE ADULT - SUBJECTIVE AND OBJECTIVE BOX
Patient seen and examined earlier today    Remains aphasic  Wide awake, alert  Follows come commands  R pupil reactive L surgical (baseline)  EOMI   facial droop resolved  BERGER RUE 4++/5 w/ no drift  Incision clean, dry, intact     VEEG neg  Na 132  Glucose > 200 on mult occasions    s/p L crani for cSDH w/ membranes w/ postop course complicated by aphasia, facial droop, mild RUE weakness  -Salt tabs started for Na 132. Avoiding extra fluids, cardio-renal function is a concern  -Insulins adjusted again   -Pending SHERITA

## 2018-01-12 NOTE — PROGRESS NOTE ADULT - SUBJECTIVE AND OBJECTIVE BOX
Raghav Seals MD  ([facility] Hospitalist)  Pager [phone]  (During off hours please page [phone])      Patient is a 89y old  Female who presents with a chief complaint of left sided chronic SDH (03 Jan 2018 23:56)      SUBJECTIVE / OVERNIGHT EVENTS: No events. Tolerating her meals well.     MEDICATIONS  (STANDING):  amLODIPine   Tablet 10 milliGRAM(s) Oral daily  dextrose 5%. 1000 milliLiter(s) (50 mL/Hr) IV Continuous <Continuous>  dextrose 50% Injectable 12.5 Gram(s) IV Push once  dextrose 50% Injectable 25 Gram(s) IV Push once  dextrose 50% Injectable 25 Gram(s) IV Push once  docusate sodium 100 milliGRAM(s) Oral daily  enoxaparin Injectable 40 milliGRAM(s) SubCutaneous <User Schedule>  furosemide    Tablet 20 milliGRAM(s) Oral every 12 hours  insulin lispro (HumaLOG) corrective regimen sliding scale   SubCutaneous at bedtime  insulin lispro (HumaLOG) corrective regimen sliding scale   SubCutaneous Before meals and at bedtime  insulin NPH/regular 70/30 Injectable 3 Unit(s) SubCutaneous two times a day before meals  lacosamide 150 milliGRAM(s) Oral two times a day  losartan 100 milliGRAM(s) Oral daily  metoprolol succinate ER 50 milliGRAM(s) Oral daily  polyethylene glycol 3350 17 Gram(s) Oral every 12 hours  senna 2 Tablet(s) Oral at bedtime  sodium chloride 1 Gram(s) Oral every 12 hours    MEDICATIONS  (PRN):  dextrose Gel 1 Dose(s) Oral once PRN Blood Glucose LESS THAN 70 milliGRAM(s)/deciliter  glucagon  Injectable 1 milliGRAM(s) IntraMuscular once PRN Glucose LESS THAN 70 milligrams/deciliter  hydrALAZINE Injectable 5 milliGRAM(s) IV Push every 3 hours PRN HTN  traMADol 25 milliGRAM(s) Oral every 4 hours PRN Moderate Pain (4 - 6)      Vital Signs Last 24 Hrs  T(C): 37 (12 Jan 2018 08:45), Max: 37.4 (11 Jan 2018 15:26)  T(F): 98.6 (12 Jan 2018 08:45), Max: 99.4 (11 Jan 2018 15:26)  HR: 60 (12 Jan 2018 08:45) (53 - 84)  BP: 162/60 (12 Jan 2018 08:45) (117/62 - 170/77)  BP(mean): --  RR: 18 (12 Jan 2018 08:45) (18 - 20)  SpO2: 97% (12 Jan 2018 08:45) (97% - 100%)  CAPILLARY BLOOD GLUCOSE      POCT Blood Glucose.: 249 mg/dL (12 Jan 2018 12:27)  POCT Blood Glucose.: 236 mg/dL (12 Jan 2018 08:11)  POCT Blood Glucose.: 177 mg/dL (11 Jan 2018 21:32)  POCT Blood Glucose.: 261 mg/dL (11 Jan 2018 17:27)    I&O's Summary    11 Jan 2018 07:01  -  12 Jan 2018 07:00  --------------------------------------------------------  IN: 920 mL / OUT: 300 mL / NET: 620 mL        PHYSICAL EXAM:  GENERAL: NAD   HEAD:  Incision c/d/i   EYES: conjunctiva and sclera clear  NECK: Supple, No JVD  CHEST/LUNG: Clear to auscultation bilaterally; No wheeze  HEART: Regular rate and rhythm; +SM.   ABDOMEN: Soft, Nontender, Nondistended; Bowel sounds present  EXTREMITIES:  2+ Peripheral Pulses, No clubbing, cyanosis, or edema  NEUROLOGY: Awake and alert, aphasic, follows commands.   SKIN: No rashes or lesions      LABS:                        11.2   5.8   )-----------( 184      ( 12 Jan 2018 11:25 )             32.5     01-12    132<L>  |  93<L>  |  15  ----------------------------<  301<H>  4.0   |  31  |  0.75    Ca    9.5      12 Jan 2018 11:26        RADIOLOGY & ADDITIONAL TESTS:    Consultant(s) Notes Reviewed: Neurosurgery    Care Discussed with Consultants/Other Providers: Neurosurgery re disposition
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
## 2018-01-13 VITALS
OXYGEN SATURATION: 100 % | HEART RATE: 64 BPM | TEMPERATURE: 98 F | RESPIRATION RATE: 18 BRPM | SYSTOLIC BLOOD PRESSURE: 149 MMHG | DIASTOLIC BLOOD PRESSURE: 69 MMHG

## 2018-01-13 LAB
ANION GAP SERPL CALC-SCNC: 11 MMOL/L — SIGNIFICANT CHANGE UP (ref 5–17)
BUN SERPL-MCNC: 16 MG/DL — SIGNIFICANT CHANGE UP (ref 7–23)
CALCIUM SERPL-MCNC: 10 MG/DL — SIGNIFICANT CHANGE UP (ref 8.4–10.5)
CHLORIDE SERPL-SCNC: 97 MMOL/L — SIGNIFICANT CHANGE UP (ref 96–108)
CO2 SERPL-SCNC: 27 MMOL/L — SIGNIFICANT CHANGE UP (ref 22–31)
CREAT SERPL-MCNC: 0.91 MG/DL — SIGNIFICANT CHANGE UP (ref 0.5–1.3)
CULTURE RESULTS: SIGNIFICANT CHANGE UP
CULTURE RESULTS: SIGNIFICANT CHANGE UP
GLUCOSE BLDC GLUCOMTR-MCNC: 228 MG/DL — HIGH (ref 70–99)
GLUCOSE SERPL-MCNC: 228 MG/DL — HIGH (ref 70–99)
HCT VFR BLD CALC: 35.3 % — SIGNIFICANT CHANGE UP (ref 34.5–45)
HGB BLD-MCNC: 11 G/DL — LOW (ref 11.5–15.5)
MCHC RBC-ENTMCNC: 29.2 PG — SIGNIFICANT CHANGE UP (ref 27–34)
MCHC RBC-ENTMCNC: 31.2 GM/DL — LOW (ref 32–36)
MCV RBC AUTO: 93.6 FL — SIGNIFICANT CHANGE UP (ref 80–100)
PLATELET # BLD AUTO: 197 K/UL — SIGNIFICANT CHANGE UP (ref 150–400)
POTASSIUM SERPL-MCNC: 4.6 MMOL/L — SIGNIFICANT CHANGE UP (ref 3.5–5.3)
POTASSIUM SERPL-SCNC: 4.6 MMOL/L — SIGNIFICANT CHANGE UP (ref 3.5–5.3)
RBC # BLD: 3.77 M/UL — LOW (ref 3.8–5.2)
RBC # FLD: 14.6 % — HIGH (ref 10.3–14.5)
SODIUM SERPL-SCNC: 135 MMOL/L — SIGNIFICANT CHANGE UP (ref 135–145)
SPECIMEN SOURCE: SIGNIFICANT CHANGE UP
SPECIMEN SOURCE: SIGNIFICANT CHANGE UP
WBC # BLD: 5.75 K/UL — SIGNIFICANT CHANGE UP (ref 3.8–10.5)
WBC # FLD AUTO: 5.75 K/UL — SIGNIFICANT CHANGE UP (ref 3.8–10.5)

## 2018-01-13 PROCEDURE — 80061 LIPID PANEL: CPT

## 2018-01-13 PROCEDURE — 99285 EMERGENCY DEPT VISIT HI MDM: CPT | Mod: 25

## 2018-01-13 PROCEDURE — P9037: CPT

## 2018-01-13 PROCEDURE — 97162 PT EVAL MOD COMPLEX 30 MIN: CPT

## 2018-01-13 PROCEDURE — 93005 ELECTROCARDIOGRAM TRACING: CPT

## 2018-01-13 PROCEDURE — 97165 OT EVAL LOW COMPLEX 30 MIN: CPT

## 2018-01-13 PROCEDURE — 93306 TTE W/DOPPLER COMPLETE: CPT

## 2018-01-13 PROCEDURE — 82550 ASSAY OF CK (CPK): CPT

## 2018-01-13 PROCEDURE — C1713: CPT

## 2018-01-13 PROCEDURE — 83036 HEMOGLOBIN GLYCOSYLATED A1C: CPT

## 2018-01-13 PROCEDURE — 80048 BASIC METABOLIC PNL TOTAL CA: CPT

## 2018-01-13 PROCEDURE — 97530 THERAPEUTIC ACTIVITIES: CPT

## 2018-01-13 PROCEDURE — 85610 PROTHROMBIN TIME: CPT

## 2018-01-13 PROCEDURE — 84484 ASSAY OF TROPONIN QUANT: CPT

## 2018-01-13 PROCEDURE — 93970 EXTREMITY STUDY: CPT

## 2018-01-13 PROCEDURE — 82140 ASSAY OF AMMONIA: CPT

## 2018-01-13 PROCEDURE — 82962 GLUCOSE BLOOD TEST: CPT

## 2018-01-13 PROCEDURE — 85730 THROMBOPLASTIN TIME PARTIAL: CPT

## 2018-01-13 PROCEDURE — 97110 THERAPEUTIC EXERCISES: CPT

## 2018-01-13 PROCEDURE — 97535 SELF CARE MNGMENT TRAINING: CPT

## 2018-01-13 PROCEDURE — 36430 TRANSFUSION BLD/BLD COMPNT: CPT

## 2018-01-13 PROCEDURE — 86850 RBC ANTIBODY SCREEN: CPT

## 2018-01-13 PROCEDURE — 86901 BLOOD TYPING SEROLOGIC RH(D): CPT

## 2018-01-13 PROCEDURE — 84443 ASSAY THYROID STIM HORMONE: CPT

## 2018-01-13 PROCEDURE — 70450 CT HEAD/BRAIN W/O DYE: CPT

## 2018-01-13 PROCEDURE — 92523 SPEECH SOUND LANG COMPREHEN: CPT

## 2018-01-13 PROCEDURE — 70551 MRI BRAIN STEM W/O DYE: CPT

## 2018-01-13 PROCEDURE — 81001 URINALYSIS AUTO W/SCOPE: CPT

## 2018-01-13 PROCEDURE — 87040 BLOOD CULTURE FOR BACTERIA: CPT

## 2018-01-13 PROCEDURE — C9254: CPT

## 2018-01-13 PROCEDURE — 71045 X-RAY EXAM CHEST 1 VIEW: CPT

## 2018-01-13 PROCEDURE — 80053 COMPREHEN METABOLIC PANEL: CPT

## 2018-01-13 PROCEDURE — 99233 SBSQ HOSP IP/OBS HIGH 50: CPT

## 2018-01-13 PROCEDURE — 86900 BLOOD TYPING SEROLOGIC ABO: CPT

## 2018-01-13 PROCEDURE — 95951: CPT

## 2018-01-13 PROCEDURE — 83735 ASSAY OF MAGNESIUM: CPT

## 2018-01-13 PROCEDURE — 84100 ASSAY OF PHOSPHORUS: CPT

## 2018-01-13 PROCEDURE — 82553 CREATINE MB FRACTION: CPT

## 2018-01-13 PROCEDURE — 85027 COMPLETE CBC AUTOMATED: CPT

## 2018-01-13 RX ORDER — HUMAN INSULIN 100 [IU]/ML
4 INJECTION, SUSPENSION SUBCUTANEOUS
Qty: 0 | Refills: 0 | COMMUNITY
Start: 2018-01-13

## 2018-01-13 RX ORDER — DEXTROSE 50 % IN WATER 50 %
25 SYRINGE (ML) INTRAVENOUS
Qty: 0 | Refills: 0 | COMMUNITY
Start: 2018-01-13

## 2018-01-13 RX ORDER — SENNA PLUS 8.6 MG/1
2 TABLET ORAL
Qty: 0 | Refills: 0 | COMMUNITY
Start: 2018-01-13

## 2018-01-13 RX ORDER — FUROSEMIDE 40 MG
1 TABLET ORAL
Qty: 0 | Refills: 0 | COMMUNITY

## 2018-01-13 RX ORDER — LACOSAMIDE 50 MG/1
1 TABLET ORAL
Qty: 0 | Refills: 0 | COMMUNITY
Start: 2018-01-13

## 2018-01-13 RX ORDER — INSULIN LISPRO 100/ML
0 VIAL (ML) SUBCUTANEOUS
Qty: 0 | Refills: 0 | COMMUNITY
Start: 2018-01-13

## 2018-01-13 RX ORDER — DEXTROSE 50 % IN WATER 50 %
50 SYRINGE (ML) INTRAVENOUS
Qty: 0 | Refills: 0 | COMMUNITY
Start: 2018-01-13

## 2018-01-13 RX ORDER — TRAMADOL HYDROCHLORIDE 50 MG/1
0.5 TABLET ORAL
Qty: 0 | Refills: 0 | COMMUNITY
Start: 2018-01-13

## 2018-01-13 RX ORDER — METOPROLOL TARTRATE 50 MG
1 TABLET ORAL
Qty: 0 | Refills: 0 | COMMUNITY

## 2018-01-13 RX ORDER — DOCUSATE SODIUM 100 MG
1 CAPSULE ORAL
Qty: 0 | Refills: 0 | COMMUNITY
Start: 2018-01-13

## 2018-01-13 RX ORDER — AMLODIPINE BESYLATE 2.5 MG/1
10 TABLET ORAL
Qty: 0 | Refills: 0 | COMMUNITY

## 2018-01-13 RX ORDER — POLYETHYLENE GLYCOL 3350 17 G/17G
17 POWDER, FOR SOLUTION ORAL
Qty: 0 | Refills: 0 | COMMUNITY
Start: 2018-01-13

## 2018-01-13 RX ORDER — ASPIRIN/CALCIUM CARB/MAGNESIUM 324 MG
1 TABLET ORAL
Qty: 0 | Refills: 0 | COMMUNITY

## 2018-01-13 RX ORDER — HUMAN INSULIN 100 [IU]/ML
12 INJECTION, SUSPENSION SUBCUTANEOUS
Qty: 0 | Refills: 0 | COMMUNITY
Start: 2018-01-13

## 2018-01-13 RX ORDER — INSULIN NPH HUM/REG INSULIN HM 70-30/ML
6 VIAL (ML) SUBCUTANEOUS
Qty: 0 | Refills: 0 | COMMUNITY

## 2018-01-13 RX ORDER — LOSARTAN POTASSIUM 100 MG/1
1 TABLET, FILM COATED ORAL
Qty: 0 | Refills: 0 | COMMUNITY

## 2018-01-13 RX ORDER — ENOXAPARIN SODIUM 100 MG/ML
40 INJECTION SUBCUTANEOUS
Qty: 0 | Refills: 0 | COMMUNITY
Start: 2018-01-13

## 2018-01-13 RX ORDER — DEXTROSE 50 % IN WATER 50 %
1 SYRINGE (ML) INTRAVENOUS
Qty: 0 | Refills: 0 | COMMUNITY
Start: 2018-01-13

## 2018-01-13 RX ORDER — AMLODIPINE BESYLATE 2.5 MG/1
1 TABLET ORAL
Qty: 0 | Refills: 0 | COMMUNITY
Start: 2018-01-13

## 2018-01-13 RX ORDER — FUROSEMIDE 40 MG
1 TABLET ORAL
Qty: 0 | Refills: 0 | COMMUNITY
Start: 2018-01-13

## 2018-01-13 RX ORDER — INSULIN NPH HUM/REG INSULIN HM 70-30/ML
30 VIAL (ML) SUBCUTANEOUS
Qty: 0 | Refills: 0 | COMMUNITY

## 2018-01-13 RX ORDER — LOSARTAN POTASSIUM 100 MG/1
1 TABLET, FILM COATED ORAL
Qty: 0 | Refills: 0 | COMMUNITY
Start: 2018-01-13

## 2018-01-13 RX ORDER — METOPROLOL TARTRATE 50 MG
1 TABLET ORAL
Qty: 0 | Refills: 0 | COMMUNITY
Start: 2018-01-13

## 2018-01-13 RX ADMIN — Medication 50 MILLIGRAM(S): at 05:23

## 2018-01-13 RX ADMIN — Medication 4: at 09:04

## 2018-01-13 RX ADMIN — HUMAN INSULIN 12 UNIT(S): 100 INJECTION, SUSPENSION SUBCUTANEOUS at 09:04

## 2018-01-13 RX ADMIN — POLYETHYLENE GLYCOL 3350 17 GRAM(S): 17 POWDER, FOR SOLUTION ORAL at 05:23

## 2018-01-13 RX ADMIN — AMLODIPINE BESYLATE 10 MILLIGRAM(S): 2.5 TABLET ORAL at 05:20

## 2018-01-13 RX ADMIN — LACOSAMIDE 150 MILLIGRAM(S): 50 TABLET ORAL at 05:20

## 2018-01-13 RX ADMIN — LOSARTAN POTASSIUM 100 MILLIGRAM(S): 100 TABLET, FILM COATED ORAL at 05:20

## 2018-01-13 RX ADMIN — SODIUM CHLORIDE 2 GRAM(S): 9 INJECTION INTRAMUSCULAR; INTRAVENOUS; SUBCUTANEOUS at 05:20

## 2018-01-13 RX ADMIN — Medication 20 MILLIGRAM(S): at 05:20

## 2018-01-13 NOTE — PROGRESS NOTE ADULT - PROBLEM SELECTOR PLAN 1
- s/p L denia hole evacuation, drain removed.  - continue AEDs for seizure prophylaxis   - plan for transfer to [facility] today
s/p L denia hole evacuation and drain placement. Drain now removed, patient stable.  - continue AEDs for seizure prophylaxis   - continue care as per neurosurgery  - continue neurochecks.
s/p L denia hole evacuation and drain placement. Drain now removed, patient stable.  - continue AEDs for seizure prophylaxis   - continue care as per neurosurgery  - continue neurochecks.
s/p L denia hole evacuation and drain placement. Drain now removed, patient stable.  - continue AEDs for seizure prophylaxis   - continue care as per neurosurgery  - plan for disposition to [facility]
s/p L denia hole evacuation and drain placement. Drain now removed, patient stable.  - continue AEDs for seizure prophylaxis lacosamide 100mg po bid  - continue care as per neurosurgery  - continue neurochecks.
s/p L denia hole evacuation and drain placement. Drain now removed, patient stable.  - continue AEDs for seizure prophylaxis   - continue care as per neurosurgery  - continue neurochecks.

## 2018-01-13 NOTE — DISCHARGE NOTE ADULT - MEDICATION SUMMARY - MEDICATIONS TO TAKE
I will START or STAY ON the medications listed below when I get home from the hospital:    traMADol 50 mg oral tablet  -- 0.5 tab(s) by mouth every 4 hours, As needed, Moderate Pain (4 - 6)  -- Indication: For Pain    losartan 100 mg oral tablet  -- 1 tab(s) by mouth once a day  -- Indication: For blood pressure    enoxaparin  -- 40 milligram(s) subcutaneous once a day (at bedtime)  -- Indication: For DVT prophylaxis    lacosamide 150 mg oral tablet  -- 1 tab(s) by mouth 2 times a day  -- Indication: For Seizure prophylaxis    insulin isophane (NPH) 100 units/mL human recombinant subcutaneous suspension  -- 12 unit(s) subcutaneous once a day (before a meal)  -- Indication: For type 2 DM    insulin isophane (NPH) 100 units/mL human recombinant subcutaneous suspension  -- 4 unit(s) subcutaneous once a day (at bedtime)  -- Indication: For type 2 DM    insulin lispro 100 units/mL subcutaneous solution  --  subcutaneous 4 times a day (before meals and at bedtime); 2 Unit(s) if Glucose 151 - 200  4 Unit(s) if Glucose 201 - 250  6 Unit(s) if Glucose 251 - 300  8 Unit(s) if Glucose 301 - 350  10 Unit(s) if Glucose 351 - 400  12 Unit(s) if Glucose Greater Than 400  -- Indication: For type 2 DM    insulin lispro 100 units/mL subcutaneous solution  --  subcutaneous once a day (at bedtime); 0 Unit(s) if Glucose 0 - 250  1 Unit(s) if Glucose 251 - 300  2 Unit(s) if Glucose 301 - 350  3 Unit(s) if Glucose 351 - 400  4 Unit(s) if Glucose Greater Than 400  -- Indication: For type 2 DM    Crestor 10 mg oral tablet  -- 1 tab(s) by mouth once a day (at bedtime)  -- Indication: For Cholesterol    metoprolol succinate 50 mg oral tablet, extended release  -- 1 tab(s) by mouth once a day  -- Indication: For blood pressure    amLODIPine 10 mg oral tablet  -- 1 tab(s) by mouth once a day  -- Indication: For blood pressure    furosemide 20 mg oral tablet  -- 1 tab(s) by mouth every 12 hours  -- Indication: For blood pressure    glucose 40% oral gel  -- 1 dose(s) by mouth once, As needed, Blood Glucose LESS THAN 70 milliGRAM(s)/deciliter  -- Indication: For type 2 DM- hypoglycemic    glucose 50% intravenous solution  -- 25 milliliter(s) intravenous once  -- Indication: For type 2 DM- hypoglycemic    glucose 50% intravenous solution  -- 50 milliliter(s) intravenous once  -- Indication: For type 2 DM- hypoglycemic    glucose 50% intravenous solution  -- 50 milliliter(s) intravenous once  -- Indication: For type 2 DM- hypoglycemic    docusate sodium 100 mg oral capsule  -- 1 cap(s) by mouth once a day  -- Indication: For Constipation    polyethylene glycol 3350 oral powder for reconstitution  -- 17 gram(s) by mouth every 12 hours  -- Indication: For Constipation    senna oral tablet  -- 2 tab(s) by mouth once a day (at bedtime)  -- Indication: For Constipation

## 2018-01-13 NOTE — PROGRESS NOTE ADULT - PROBLEM SELECTOR PROBLEM 3
Fever, unspecified fever cause

## 2018-01-13 NOTE — PROGRESS NOTE ADULT - PROBLEM SELECTOR PLAN 4
AC currently contraindicated given SDH s/p evacuation. EKG with sinus rhythm this admission  - continue monitoring clinically  - continue toprol xl 50mg po daily

## 2018-01-13 NOTE — PROGRESS NOTE ADULT - PROVIDER SPECIALTY LIST ADULT
Hospitalist
NSICU
Neurosurgery
NSICU
Neurosurgery
Hospitalist

## 2018-01-13 NOTE — DISCHARGE NOTE ADULT - CARE PROVIDER_API CALL
Walter Gomez), Neurological Surgery  [address] [place] [address]  Phone: [phone]  Fax: [phone]    Nissenbaum, Michael A (MD), Neurology  [address]  [place] [address]  Phone: [phone]  Fax: [phone]
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
## 2018-01-13 NOTE — PROGRESS NOTE ADULT - SUBJECTIVE AND OBJECTIVE BOX
CHIEF COMPLAINT: patient in NAD, much more alert, remains verbal however garbled speech  following some simple commands and grossly BERGER with right side weakness.     Vital Signs Last 24 Hrs  T(C): 36.8 (2018 07:44), Max: 37.1 (2018 05:16)  T(F): 98.3 (2018 07:44), Max: 98.8 (2018 05:16)  HR: 64 (2018 07:44) (64 - 71)  BP: 149/69 (2018 07:44) (148/63 - 170/74)  BP(mean): --  RR: 18 (2018 07:44) (18 - 20)  SpO2: 100% (2018 07:44) (100% - 100%)      PHYSICAL EXAM:    General: No Acute Distress     Neurological: patient in NAD, much more alert, tells me her name but nothing else, conversed with family earlier on phone per son at bedside; more verbal today however garbled speech  following some simple commands and grossly BERGER with right side weakness; left side 5/5 +right facial droop    Pulmonary: Clear to Auscultation, No Rales, No Rhonchi, No Wheezes     Cardiovascular: S1, S2, Regular Rate and Rhythm     Gastrointestinal: Soft, Nontender, Nondistended     Extremities: No calf tenderness     Incision: +staples c/d/i, thick bloody scab over incision    LABS:                                          11.0   5.75  )-----------( 197      ( 2018 08:37 )             35.3   01-13    135  |  97  |  16  ----------------------------<  228<H>  4.6   |  27  |  0.91    Ca    10.0      2018 08:55      MEDICATIONS  (STANDING):  amLODIPine   Tablet 10 milliGRAM(s) Oral daily  dextrose 5%. 1000 milliLiter(s) (50 mL/Hr) IV Continuous <Continuous>  dextrose 50% Injectable 12.5 Gram(s) IV Push once  dextrose 50% Injectable 25 Gram(s) IV Push once  dextrose 50% Injectable 25 Gram(s) IV Push once  docusate sodium 100 milliGRAM(s) Oral daily  enoxaparin Injectable 40 milliGRAM(s) SubCutaneous <User Schedule>  furosemide    Tablet 20 milliGRAM(s) Oral every 12 hours  insulin lispro (HumaLOG) corrective regimen sliding scale   SubCutaneous at bedtime  insulin lispro (HumaLOG) corrective regimen sliding scale   SubCutaneous Before meals and at bedtime  insulin NPH human recombinant 12 Unit(s) SubCutaneous before breakfast  insulin NPH human recombinant 4 Unit(s) SubCutaneous at bedtime  lacosamide 150 milliGRAM(s) Oral two times a day  losartan 100 milliGRAM(s) Oral daily  metoprolol succinate ER 50 milliGRAM(s) Oral daily  polyethylene glycol 3350 17 Gram(s) Oral every 12 hours  senna 2 Tablet(s) Oral at bedtime    MEDICATIONS  (PRN):  dextrose Gel 1 Dose(s) Oral once PRN Blood Glucose LESS THAN 70 milliGRAM(s)/deciliter  glucagon  Injectable 1 milliGRAM(s) IntraMuscular once PRN Glucose LESS THAN 70 milligrams/deciliter  traMADol 25 milliGRAM(s) Oral every 4 hours PRN Moderate Pain (4 - 6)    DIET: [] Regular [x] CCD [] Renal [] Puree [] Dysphagia [] Tube Feeds:     IMAGIN< from: MR Head No Cont (18 @ 20:17) >  No evidence of acute or subacute infarction.  Redemonstrated left-sided holohemispheric subdural hematoma measuring 1.2   cm in greatest depth, unchanged from most recent prior head CT.  Additional small subdural hemorrhages including adjacent to the right   occipital lobe measuring 0.4 cm in greatest depth, and in the   interhemispheric fissure measuring 0.7 cm in greatest depth. There is a   rightward midline shift measuring 0.6 cm, unchanged from most recent   prior head CT.  < end of copied text >    2 < from: VA Duplex Lower Ext Vein Scan, Bilat (18 @ 10:10) >  No evidence of bilateral lower extremity deep venous thrombosis.  < end of copied text >\    3< from: Xray Chest 1 View AP- PORTABLE-Urgent (18 @ 22:38) >     Cardiomegaly  < end of copied text >    4< from: CT Head No Cont (18 @ 16:56) >    Residual subdural is again seen and unchanged when underlying     for differences in technique..  < end of copied text >

## 2018-01-13 NOTE — PROGRESS NOTE ADULT - PROBLEM SELECTOR PROBLEM 2
Encephalopathy acute

## 2018-01-13 NOTE — DISCHARGE NOTE ADULT - REASON FOR ADMISSION
1/4 s/p left craniotomy for evacuation of subdural hematoma (SDH) complicated by aphasia- no seizures on EEG, and post op fever with negative workup and unclear etiology.
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
## 2018-01-13 NOTE — DISCHARGE NOTE ADULT - PATIENT PORTAL LINK FT
“You can access the Follow[employer] Patient Portal, offered by [facility], by registering with the following website: [email or web address]”

## 2018-01-13 NOTE — DISCHARGE NOTE ADULT - CARE PROVIDERS DIRECT ADDRESSES
[email or web address]

## 2018-01-13 NOTE — DISCHARGE NOTE ADULT - NS AS ACTIVITY OBS
Walking-Outdoors allowed/Stairs allowed/Walking-Indoors allowed/Do not drive or operate machinery/Bathing allowed/No Heavy lifting/straining/Do not make important decisions/Showering allowed

## 2018-01-13 NOTE — DISCHARGE NOTE ADULT - CARE PLAN
Principal Discharge DX:	SDH (subdural hematoma)  Goal:	1/4 s/p left craniotomy for evacuation of subdural hematoma (SDH)  Instructions for follow-up, activity and diet:	Dr Gomez- neurosurgeon- please call office for appointment upon discharge from rehab.  Primary Care Physician upon discharge from rehab.   Dr Nissenbaum- neurologist- please call office for appointment upon discharge from rehab.   please notify physician if fevers, bleeding, swelling, pain not relieved by medication, increased sluggishness or irritability, increased nausea or vomiting, inability to tolerate foods or liquids.   please do not engage in strenuous activity, heavy lifting, drive, or return to work or school until cleared by surgeon.   please keep incision clean and dry, do not submerge wound in water for prolonged periods of time, pat dry after showering, and do not use any creams or ointments to incision.  Secondary Diagnosis:	Hypertension  Instructions for follow-up, activity and diet:	Please continue medications and follow up with your primary care physician upon discharge from rehab.  Secondary Diagnosis:	Type 2 diabetes mellitus  Instructions for follow-up, activity and diet:	Please continue medications and follow up with your primary care physician upon discharge from rehab. may need to uptitrate inulins to home dose as PO intake improves.  Secondary Diagnosis:	Fever, unspecified fever cause  Instructions for follow-up, activity and diet:	Please follow up with your primary care physician upon discharge from rehab.  Secondary Diagnosis:	Atrial fibrillation  Instructions for follow-up, activity and diet:	Rate controlled. Please follow up with your primary care physician upon discharge from rehab.  Please do not restart aspirin until cleared by neurosurgeon.  Secondary Diagnosis:	Aphasia  Instructions for follow-up, activity and diet:	Please follow up with your neurosurgeon and primary care physician upon discharge from rehab.  Speech therapy while in rehab.

## 2018-01-13 NOTE — PROGRESS NOTE ADULT - PROBLEM SELECTOR PROBLEM 4
Atrial fibrillation

## 2018-01-13 NOTE — PROGRESS NOTE ADULT - PROBLEM SELECTOR PLAN 5
Continue NPH insulin 70/30 25 units QAm w breakfast, 6 units QHS   - continue sliding scale insulin with fingerstick monitoring.
Continue NPH insulin 70/30 25 units QAm w breakfast, 6 units QHS (held while patient was NPO)   - continue sliding scale insulin with fingerstick monitoring.
Hypoglycemic episode on 1/9 likely due to poor PO intake  - held NPH 70/30, will restart NPH at a much lower dose of 3units q12h   - do not recommend aggressive glycemic control given advanced age   - continue sliding scale insulin with fingerstick monitoring.
Hypoglycemic episode on 1/9 likely due to poor PO intake. Initially NPH 70/30 was held, and later restarted NPH at a much lower dose of 3units q12h   - Today the patient is hyperglycemic. May increase NPH 70/30 to 12 units before breakfast, and 4 units before dinner.   - do not recommend aggressive glycemic control given advanced age   - continue sliding scale insulin with fingerstick monitoring.
Hypoglycemic episode on 1/9 likely due to poor PO intake. Initially NPH 70/30 was held, and later restarted NPH at a much lower dose of 3units q12h   - Yesterday, due to hyperglycemia NPH 70/30 was increased to 12 units before breakfast, and 4 units before dinner. Would continue the insulin at the current doses and monitor FS at rehab. Adjust insulin PRN.   - do not recommend aggressive glycemic control given advanced age   - continue sliding scale insulin with fingerstick monitoring.
Hypoglycemic yesterday likely due to poor PO intake  - agree with holding NPH 70/30    - continue sliding scale insulin with fingerstick monitoring.

## 2018-01-13 NOTE — PROGRESS NOTE ADULT - ASSESSMENT
89F hx Afib not on AC, HTN, DM type 2, endocarditis treated in 2010, cervical ca s/p hysterectomy in 2000, prior PNA unable to be weaned off ventilator s/p trach that is now removed, transferred from OSH for chronic subdural hematoma evacuation

## 2018-01-13 NOTE — DISCHARGE NOTE ADULT - MEDICATION SUMMARY - MEDICATIONS TO STOP TAKING
I will STOP taking the medications listed below when I get home from the hospital:    aspirin 81 mg oral tablet  -- 1 tab(s) by mouth once a day    NovoLIN 70/30 subcutaneous suspension  -- 30 unit(s) subcutaneous once a day (in the morning), As Needed    NovoLIN 70/30 subcutaneous suspension  -- 6 unit(s) subcutaneous once a day (at bedtime), As Needed

## 2018-01-13 NOTE — PROGRESS NOTE ADULT - PROBLEM SELECTOR PROBLEM 1
Chronic subdural hematoma

## 2018-01-13 NOTE — PROGRESS NOTE ADULT - PROBLEM SELECTOR PLAN 2
- likely metabolic due to SDH and probable seizure, mental status improving   - repeat CT head 1/7 with unchanged residual subdural hematoma  - plan for another CT head and MRI brain   - continue Vimpat, obtain VEEG   - neurology input appreciated
- likely metabolic due to SDH and probable seizure, mental status has significantly improved   - repeat CT head 1/7 with unchanged residual subdural hematoma  - MRI brain as above   - continue Vimpat  - awaiting VEEG   - neurology input appreciated
- likely metabolic due to SDH and probable seizure, mental status has significantly improved   - repeat CT head 1/7 with unchanged residual subdural hematoma  - MRI brain with no acute or subacute infarction   - VEEG with cerebral dysfunction, no active seizure  - continue Vimpat  - neurology input appreciated
- likely metabolic due to SDH and probable seizure, mental status has significantly improved   - repeat CT head 1/7 with unchanged residual subdural hematoma  - MRI brain with no acute or subacute infarction   - VEEG with cerebral dysfunction, no active seizure  - continue Vimpat per neurology
- likely metabolic due to SDH and probable seizure, mental status has significantly improved   - repeat CT head 1/7 with unchanged residual subdural hematoma  - MRI brain with no acute or subacute infarction   - VEEG with cerebral dysfunction, no active seizure  - continue Vimpat per neurology  - Unclear if the patient had true mild hyponatremia 132 yesterday or if it was a lab error. Improved to normal today. Would d/c NaCl tabs. Recheck BMP in 3 days in [facility].
- likely metabolic due to SDH and probable seizure, mental status improving   - repeat CT head 1/7 with unchanged residual subdural hematoma  - plan for MRI brain   - continue Vimpat, obtain VEEG   - neurology input appreciated

## 2018-01-13 NOTE — DISCHARGE NOTE ADULT - PLAN OF CARE
1/4 s/p left craniotomy for evacuation of subdural hematoma (SDH) Dr Gomez- neurosurgeon- please call office for appointment upon discharge from rehab.  Primary Care Physician upon discharge from rehab.   Dr Nissenbaum- neurologist- please call office for appointment upon discharge from rehab.   please notify physician if fevers, bleeding, swelling, pain not relieved by medication, increased sluggishness or irritability, increased nausea or vomiting, inability to tolerate foods or liquids.   please do not engage in strenuous activity, heavy lifting, drive, or return to work or school until cleared by surgeon.   please keep incision clean and dry, do not submerge wound in water for prolonged periods of time, pat dry after showering, and do not use any creams or ointments to incision. Please continue medications and follow up with your primary care physician upon discharge from rehab. Please continue medications and follow up with your primary care physician upon discharge from rehab. may need to uptitrate inulins to home dose as PO intake improves. Please follow up with your primary care physician upon discharge from rehab. Rate controlled. Please follow up with your primary care physician upon discharge from rehab.  Please do not restart aspirin until cleared by neurosurgeon. Please follow up with your neurosurgeon and primary care physician upon discharge from rehab.  Speech therapy while in rehab.

## 2018-01-13 NOTE — PROGRESS NOTE ADULT - PROBLEM SELECTOR PLAN 6
- Continue Toprol xl 50mg po daily, losartan 100mg po daily, Norvasc 10mg daily, Lasix 20-mg po bid  - continue hydralazine pushes PRN.
- BP continues to be elevated   - Continue Toprol xl 50mg po daily, losartan 100mg po daily, Norvasc 10mg daily, Lasix 20-mg po bid  - continue hydralazine pushes PRN.
- Continue Toprol xl 50mg po daily, losartan 100mg po daily, Norvasc 10mg daily, Lasix 20-mg po bid
- Continue Toprol xl 50mg po daily, losartan 100mg po daily, Norvasc 10mg daily, Lasix 20-mg po bid  - continue hydralazine pushes PRN.

## 2018-01-13 NOTE — PROGRESS NOTE ADULT - PROBLEM SELECTOR PLAN 3
- fever of 100.4, no clear source of infection, no leukocytosis. ?aspiration event during seizure.   - UA negative, CXR with clear lungs.   - blood cultures NGTD  - stable off antibiotics, no more fevers   - LE dopplers negative for DVT
- No fever for days. No obvious s/s of infection.   - UA negative, CXR with clear lungs.   - blood cultures negative  - no further fevers   - stable off antibiotics   - LE dopplers negative for DVT
- No fever for days. No obvious s/s of infection.   - UA negative, CXR with clear lungs.   - blood cultures negative  - no further fevers   - stable off antibiotics   - LE dopplers negative for DVT
- fever of 100.4, no clear source of infection, no leukocytosis.   - UA negative, CXR with clear lungs.   - f/up blood cultures  - monitor off antibiotics, but start empiric Zosyn if deteriorates   - LE dopplers negative
- fever of 100.4, no clear source of infection, no leukocytosis. ?Aspiration event during seizure.   - UA negative, CXR with clear lungs.   - blood cultures NGTD  - no further fevers   - stable off antibiotics   - LE dopplers negative for DVT
- fever of 100.4, no clear source of infection, no leukocytosis. ?aspiration event during seizure.   - UA negative, CXR with clear lungs.   - blood cultures NGTD  - no further fevers   - stable off antibiotics   - LE dopplers negative for DVT

## 2018-01-13 NOTE — DISCHARGE NOTE ADULT - ADDITIONAL INSTRUCTIONS
Please uptitrate insulins as PO intake improves and monitor finger sticks.   Please check chemistry (BMP) in 3-5 days, salt tabs stopped on discharge. Limit free water intake to 1L/ day.   Please do not restart aspirin until cleared by Dr Gomez.  Please continue speech therapy in rehab.   Please remove staples on 1/18/18

## 2018-01-13 NOTE — PROGRESS NOTE ADULT - SUBJECTIVE AND OBJECTIVE BOX
Raghav Seals MD  ([facility] Hospitalist)  Pager [phone]  (During off hours please page [phone])      Patient is a 89y old  Female who presents with a chief complaint of left sided chronic SDH (03 Jan 2018 23:56)      SUBJECTIVE / OVERNIGHT EVENTS: No events overnight. No new complaints.      MEDICATIONS  (STANDING):  amLODIPine   Tablet 10 milliGRAM(s) Oral daily  dextrose 5%. 1000 milliLiter(s) (50 mL/Hr) IV Continuous <Continuous>  dextrose 50% Injectable 12.5 Gram(s) IV Push once  dextrose 50% Injectable 25 Gram(s) IV Push once  dextrose 50% Injectable 25 Gram(s) IV Push once  docusate sodium 100 milliGRAM(s) Oral daily  enoxaparin Injectable 40 milliGRAM(s) SubCutaneous <User Schedule>  furosemide    Tablet 20 milliGRAM(s) Oral every 12 hours  insulin lispro (HumaLOG) corrective regimen sliding scale   SubCutaneous at bedtime  insulin lispro (HumaLOG) corrective regimen sliding scale   SubCutaneous Before meals and at bedtime  insulin NPH human recombinant 12 Unit(s) SubCutaneous before breakfast  insulin NPH human recombinant 4 Unit(s) SubCutaneous at bedtime  lacosamide 150 milliGRAM(s) Oral two times a day  losartan 100 milliGRAM(s) Oral daily  metoprolol succinate ER 50 milliGRAM(s) Oral daily  polyethylene glycol 3350 17 Gram(s) Oral every 12 hours  senna 2 Tablet(s) Oral at bedtime    MEDICATIONS  (PRN):  dextrose Gel 1 Dose(s) Oral once PRN Blood Glucose LESS THAN 70 milliGRAM(s)/deciliter  glucagon  Injectable 1 milliGRAM(s) IntraMuscular once PRN Glucose LESS THAN 70 milligrams/deciliter  traMADol 25 milliGRAM(s) Oral every 4 hours PRN Moderate Pain (4 - 6)      Vital Signs Last 24 Hrs  T(C): 36.8 (13 Jan 2018 07:44), Max: 37.1 (13 Jan 2018 05:16)  T(F): 98.3 (13 Jan 2018 07:44), Max: 98.8 (13 Jan 2018 05:16)  HR: 64 (13 Jan 2018 07:44) (64 - 71)  BP: 149/69 (13 Jan 2018 07:44) (148/63 - 170/74)  BP(mean): --  RR: 18 (13 Jan 2018 07:44) (18 - 20)  SpO2: 100% (13 Jan 2018 07:44) (100% - 100%)  CAPILLARY BLOOD GLUCOSE      POCT Blood Glucose.: 228 mg/dL (13 Jan 2018 08:39)  POCT Blood Glucose.: 256 mg/dL (12 Jan 2018 21:35)  POCT Blood Glucose.: 254 mg/dL (12 Jan 2018 17:14)  POCT Blood Glucose.: 249 mg/dL (12 Jan 2018 12:27)    I&O's Summary    12 Jan 2018 07:01  -  13 Jan 2018 07:00  --------------------------------------------------------  IN: 360 mL / OUT: 0 mL / NET: 360 mL        PHYSICAL EXAM:  GENERAL: NAD   HEAD:  Incision c/d/i   EYES: conjunctiva and sclera clear  NECK: Supple, No JVD  CHEST/LUNG: Clear to auscultation bilaterally; No wheeze  HEART: Regular rate and rhythm; +SM.   ABDOMEN: Soft, Nontender, Nondistended; Bowel sounds present  EXTREMITIES:  2+ Peripheral Pulses, No clubbing, cyanosis, or edema  NEUROLOGY: Awake and alert, aphasic, follows commands.   SKIN: No rashes or lesions      LABS:                        11.0   5.75  )-----------( 197      ( 13 Jan 2018 08:37 )             35.3     01-13    135  |  97  |  16  ----------------------------<  228<H>  4.6   |  27  |  0.91    Ca    10.0      13 Jan 2018 08:55        RADIOLOGY & ADDITIONAL TESTS:    Consultant(s) Notes Reviewed: NSx    Care Discussed with Consultants/Other Providers: Neurosurgery re: dispo medications.

## 2018-01-25 ENCOUNTER — INPATIENT (INPATIENT)
Facility: HOSPITAL | Age: 83
LOS: 5 days | Discharge: HOME | DRG: 871 | End: 2018-01-31
Attending: INTERNAL MEDICINE | Admitting: STUDENT IN AN ORGANIZED HEALTH CARE EDUCATION/TRAINING PROGRAM
Payer: MEDICARE

## 2018-01-25 VITALS — DIASTOLIC BLOOD PRESSURE: 67 MMHG | HEART RATE: 82 BPM | SYSTOLIC BLOOD PRESSURE: 160 MMHG | RESPIRATION RATE: 20 BRPM

## 2018-01-25 DIAGNOSIS — Z90.710 ACQUIRED ABSENCE OF BOTH CERVIX AND UTERUS: Chronic | ICD-10-CM

## 2018-01-25 DIAGNOSIS — Z29.9 ENCOUNTER FOR PROPHYLACTIC MEASURES, UNSPECIFIED: ICD-10-CM

## 2018-01-25 DIAGNOSIS — J10.1 INFLUENZA DUE TO OTHER IDENTIFIED INFLUENZA VIRUS WITH OTHER RESPIRATORY MANIFESTATIONS: ICD-10-CM

## 2018-01-25 DIAGNOSIS — E11.65 TYPE 2 DIABETES MELLITUS WITH HYPERGLYCEMIA: ICD-10-CM

## 2018-01-25 DIAGNOSIS — I10 ESSENTIAL (PRIMARY) HYPERTENSION: ICD-10-CM

## 2018-01-25 DIAGNOSIS — Z98.890 OTHER SPECIFIED POSTPROCEDURAL STATES: Chronic | ICD-10-CM

## 2018-01-25 DIAGNOSIS — J15.9 UNSPECIFIED BACTERIAL PNEUMONIA: ICD-10-CM

## 2018-01-25 DIAGNOSIS — I62.00 NONTRAUMATIC SUBDURAL HEMORRHAGE, UNSPECIFIED: ICD-10-CM

## 2018-01-25 DIAGNOSIS — J96.01 ACUTE RESPIRATORY FAILURE WITH HYPOXIA: ICD-10-CM

## 2018-01-25 DIAGNOSIS — Z71.89 OTHER SPECIFIED COUNSELING: ICD-10-CM

## 2018-01-25 DIAGNOSIS — J18.9 PNEUMONIA, UNSPECIFIED ORGANISM: ICD-10-CM

## 2018-01-25 DIAGNOSIS — I48.0 PAROXYSMAL ATRIAL FIBRILLATION: ICD-10-CM

## 2018-01-25 LAB
ALBUMIN SERPL ELPH-MCNC: 3.9 G/DL — SIGNIFICANT CHANGE UP (ref 3.3–5)
ALP SERPL-CCNC: 115 U/L — SIGNIFICANT CHANGE UP (ref 40–120)
ALT FLD-CCNC: 23 U/L RC — SIGNIFICANT CHANGE UP (ref 10–45)
ANION GAP SERPL CALC-SCNC: 11 MMOL/L — SIGNIFICANT CHANGE UP (ref 5–17)
APPEARANCE UR: CLEAR — SIGNIFICANT CHANGE UP
AST SERPL-CCNC: 25 U/L — SIGNIFICANT CHANGE UP (ref 10–40)
BASOPHILS # BLD AUTO: 0 K/UL — SIGNIFICANT CHANGE UP (ref 0–0.2)
BASOPHILS NFR BLD AUTO: 0 % — SIGNIFICANT CHANGE UP (ref 0–2)
BILIRUB SERPL-MCNC: 0.4 MG/DL — SIGNIFICANT CHANGE UP (ref 0.2–1.2)
BILIRUB UR-MCNC: NEGATIVE — SIGNIFICANT CHANGE UP
BUN SERPL-MCNC: 13 MG/DL — SIGNIFICANT CHANGE UP (ref 7–23)
CALCIUM SERPL-MCNC: 10.2 MG/DL — SIGNIFICANT CHANGE UP (ref 8.4–10.5)
CHLORIDE SERPL-SCNC: 98 MMOL/L — SIGNIFICANT CHANGE UP (ref 96–108)
CO2 SERPL-SCNC: 28 MMOL/L — SIGNIFICANT CHANGE UP (ref 22–31)
COLOR SPEC: SIGNIFICANT CHANGE UP
CREAT SERPL-MCNC: 0.8 MG/DL — SIGNIFICANT CHANGE UP (ref 0.5–1.3)
DIFF PNL FLD: NEGATIVE — SIGNIFICANT CHANGE UP
EOSINOPHIL # BLD AUTO: 0 K/UL — SIGNIFICANT CHANGE UP (ref 0–0.5)
EOSINOPHIL NFR BLD AUTO: 0.6 % — SIGNIFICANT CHANGE UP (ref 0–6)
FLUAV H1 2009 PAND RNA SPEC QL NAA+PROBE: DETECTED
GAS PNL BLDV: SIGNIFICANT CHANGE UP
GLUCOSE SERPL-MCNC: 385 MG/DL — HIGH (ref 70–99)
GLUCOSE UR QL: >1000 MG/DL
HCT VFR BLD CALC: 36.4 % — SIGNIFICANT CHANGE UP (ref 34.5–45)
HGB BLD-MCNC: 12.9 G/DL — SIGNIFICANT CHANGE UP (ref 11.5–15.5)
KETONES UR-MCNC: ABNORMAL
LEUKOCYTE ESTERASE UR-ACNC: NEGATIVE — SIGNIFICANT CHANGE UP
LYMPHOCYTES # BLD AUTO: 0.2 K/UL — LOW (ref 1–3.3)
LYMPHOCYTES # BLD AUTO: 3.8 % — LOW (ref 13–44)
MCHC RBC-ENTMCNC: 32.4 PG — SIGNIFICANT CHANGE UP (ref 27–34)
MCHC RBC-ENTMCNC: 35.4 GM/DL — SIGNIFICANT CHANGE UP (ref 32–36)
MCV RBC AUTO: 91.6 FL — SIGNIFICANT CHANGE UP (ref 80–100)
MONOCYTES # BLD AUTO: 0.5 K/UL — SIGNIFICANT CHANGE UP (ref 0–0.9)
MONOCYTES NFR BLD AUTO: 7.3 % — SIGNIFICANT CHANGE UP (ref 2–14)
NEUTROPHILS # BLD AUTO: 5.6 K/UL — SIGNIFICANT CHANGE UP (ref 1.8–7.4)
NEUTROPHILS NFR BLD AUTO: 88.3 % — HIGH (ref 43–77)
NITRITE UR-MCNC: NEGATIVE — SIGNIFICANT CHANGE UP
PH UR: 7 — SIGNIFICANT CHANGE UP (ref 5–8)
PLATELET # BLD AUTO: 170 K/UL — SIGNIFICANT CHANGE UP (ref 150–400)
POTASSIUM SERPL-MCNC: 4.7 MMOL/L — SIGNIFICANT CHANGE UP (ref 3.5–5.3)
POTASSIUM SERPL-SCNC: 4.7 MMOL/L — SIGNIFICANT CHANGE UP (ref 3.5–5.3)
PROT SERPL-MCNC: 8.1 G/DL — SIGNIFICANT CHANGE UP (ref 6–8.3)
PROT UR-MCNC: 30 MG/DL
RAPID RVP RESULT: DETECTED
RBC # BLD: 3.98 M/UL — SIGNIFICANT CHANGE UP (ref 3.8–5.2)
RBC # FLD: 14.3 % — SIGNIFICANT CHANGE UP (ref 10.3–14.5)
SODIUM SERPL-SCNC: 137 MMOL/L — SIGNIFICANT CHANGE UP (ref 135–145)
SP GR SPEC: 1.02 — SIGNIFICANT CHANGE UP (ref 1.01–1.02)
UROBILINOGEN FLD QL: NEGATIVE — SIGNIFICANT CHANGE UP
WBC # BLD: 6.3 K/UL — SIGNIFICANT CHANGE UP (ref 3.8–10.5)
WBC # FLD AUTO: 6.3 K/UL — SIGNIFICANT CHANGE UP (ref 3.8–10.5)
WBC UR QL: SIGNIFICANT CHANGE UP /HPF (ref 0–5)

## 2018-01-25 PROCEDURE — 99291 CRITICAL CARE FIRST HOUR: CPT | Mod: GC

## 2018-01-25 PROCEDURE — 99223 1ST HOSP IP/OBS HIGH 75: CPT | Mod: GC

## 2018-01-25 PROCEDURE — 71045 X-RAY EXAM CHEST 1 VIEW: CPT | Mod: 26

## 2018-01-25 PROCEDURE — 70450 CT HEAD/BRAIN W/O DYE: CPT | Mod: 26

## 2018-01-25 PROCEDURE — 99497 ADVNCD CARE PLAN 30 MIN: CPT | Mod: 25

## 2018-01-25 PROCEDURE — 99291 CRITICAL CARE FIRST HOUR: CPT

## 2018-01-25 RX ORDER — AZITHROMYCIN 500 MG/1
500 TABLET, FILM COATED ORAL EVERY 24 HOURS
Qty: 0 | Refills: 0 | Status: DISCONTINUED | OUTPATIENT
Start: 2018-01-26 | End: 2018-01-27

## 2018-01-25 RX ORDER — ENOXAPARIN SODIUM 100 MG/ML
40 INJECTION SUBCUTANEOUS AT BEDTIME
Qty: 0 | Refills: 0 | Status: DISCONTINUED | OUTPATIENT
Start: 2018-01-25 | End: 2018-01-26

## 2018-01-25 RX ORDER — LACOSAMIDE 50 MG/1
150 TABLET ORAL
Qty: 0 | Refills: 0 | Status: DISCONTINUED | OUTPATIENT
Start: 2018-01-25 | End: 2018-01-31

## 2018-01-25 RX ORDER — IPRATROPIUM/ALBUTEROL SULFATE 18-103MCG
3 AEROSOL WITH ADAPTER (GRAM) INHALATION EVERY 6 HOURS
Qty: 0 | Refills: 0 | Status: DISCONTINUED | OUTPATIENT
Start: 2018-01-25 | End: 2018-01-31

## 2018-01-25 RX ORDER — CEFEPIME 1 G/1
2000 INJECTION, POWDER, FOR SOLUTION INTRAMUSCULAR; INTRAVENOUS ONCE
Qty: 0 | Refills: 0 | Status: COMPLETED | OUTPATIENT
Start: 2018-01-25 | End: 2018-01-25

## 2018-01-25 RX ORDER — INSULIN LISPRO 100/ML
VIAL (ML) SUBCUTANEOUS EVERY 6 HOURS
Qty: 0 | Refills: 0 | Status: DISCONTINUED | OUTPATIENT
Start: 2018-01-25 | End: 2018-01-27

## 2018-01-25 RX ORDER — DEXTROSE 50 % IN WATER 50 %
12.5 SYRINGE (ML) INTRAVENOUS ONCE
Qty: 0 | Refills: 0 | Status: DISCONTINUED | OUTPATIENT
Start: 2018-01-25 | End: 2018-01-31

## 2018-01-25 RX ORDER — VANCOMYCIN HCL 1 G
1000 VIAL (EA) INTRAVENOUS EVERY 12 HOURS
Qty: 0 | Refills: 0 | Status: DISCONTINUED | OUTPATIENT
Start: 2018-01-26 | End: 2018-01-27

## 2018-01-25 RX ORDER — DEXTROSE 50 % IN WATER 50 %
25 SYRINGE (ML) INTRAVENOUS ONCE
Qty: 0 | Refills: 0 | Status: DISCONTINUED | OUTPATIENT
Start: 2018-01-25 | End: 2018-01-29

## 2018-01-25 RX ORDER — DOCUSATE SODIUM 100 MG
100 CAPSULE ORAL DAILY
Qty: 0 | Refills: 0 | Status: DISCONTINUED | OUTPATIENT
Start: 2018-01-25 | End: 2018-01-31

## 2018-01-25 RX ORDER — SODIUM CHLORIDE 9 MG/ML
1000 INJECTION INTRAMUSCULAR; INTRAVENOUS; SUBCUTANEOUS ONCE
Qty: 0 | Refills: 0 | Status: COMPLETED | OUTPATIENT
Start: 2018-01-25 | End: 2018-01-25

## 2018-01-25 RX ORDER — PIPERACILLIN AND TAZOBACTAM 4; .5 G/20ML; G/20ML
3.38 INJECTION, POWDER, LYOPHILIZED, FOR SOLUTION INTRAVENOUS ONCE
Qty: 0 | Refills: 0 | Status: COMPLETED | OUTPATIENT
Start: 2018-01-25 | End: 2018-01-25

## 2018-01-25 RX ORDER — DEXTROSE 50 % IN WATER 50 %
1 SYRINGE (ML) INTRAVENOUS ONCE
Qty: 0 | Refills: 0 | Status: DISCONTINUED | OUTPATIENT
Start: 2018-01-25 | End: 2018-01-29

## 2018-01-25 RX ORDER — VANCOMYCIN HCL 1 G
1000 VIAL (EA) INTRAVENOUS ONCE
Qty: 0 | Refills: 0 | Status: COMPLETED | OUTPATIENT
Start: 2018-01-25 | End: 2018-01-25

## 2018-01-25 RX ORDER — ACETAMINOPHEN 500 MG
650 TABLET ORAL EVERY 6 HOURS
Qty: 0 | Refills: 0 | Status: DISCONTINUED | OUTPATIENT
Start: 2018-01-25 | End: 2018-01-31

## 2018-01-25 RX ORDER — AZITHROMYCIN 500 MG/1
TABLET, FILM COATED ORAL
Qty: 0 | Refills: 0 | Status: DISCONTINUED | OUTPATIENT
Start: 2018-01-25 | End: 2018-01-27

## 2018-01-25 RX ORDER — METOPROLOL TARTRATE 50 MG
5 TABLET ORAL EVERY 12 HOURS
Qty: 0 | Refills: 0 | Status: DISCONTINUED | OUTPATIENT
Start: 2018-01-25 | End: 2018-01-25

## 2018-01-25 RX ORDER — GLUCAGON INJECTION, SOLUTION 0.5 MG/.1ML
1 INJECTION, SOLUTION SUBCUTANEOUS ONCE
Qty: 0 | Refills: 0 | Status: DISCONTINUED | OUTPATIENT
Start: 2018-01-25 | End: 2018-01-31

## 2018-01-25 RX ORDER — AZITHROMYCIN 500 MG/1
500 TABLET, FILM COATED ORAL ONCE
Qty: 0 | Refills: 0 | Status: COMPLETED | OUTPATIENT
Start: 2018-01-25 | End: 2018-01-25

## 2018-01-25 RX ORDER — METOPROLOL TARTRATE 50 MG
5 TABLET ORAL EVERY 6 HOURS
Qty: 0 | Refills: 0 | Status: DISCONTINUED | OUTPATIENT
Start: 2018-01-25 | End: 2018-01-25

## 2018-01-25 RX ORDER — POLYETHYLENE GLYCOL 3350 17 G/17G
17 POWDER, FOR SOLUTION ORAL EVERY 12 HOURS
Qty: 0 | Refills: 0 | Status: DISCONTINUED | OUTPATIENT
Start: 2018-01-25 | End: 2018-01-31

## 2018-01-25 RX ORDER — SODIUM CHLORIDE 9 MG/ML
1000 INJECTION, SOLUTION INTRAVENOUS
Qty: 0 | Refills: 0 | Status: DISCONTINUED | OUTPATIENT
Start: 2018-01-25 | End: 2018-01-26

## 2018-01-25 RX ORDER — ACETAMINOPHEN 500 MG
1000 TABLET ORAL ONCE
Qty: 0 | Refills: 0 | Status: COMPLETED | OUTPATIENT
Start: 2018-01-25 | End: 2018-01-25

## 2018-01-25 RX ORDER — PIPERACILLIN AND TAZOBACTAM 4; .5 G/20ML; G/20ML
3.38 INJECTION, POWDER, LYOPHILIZED, FOR SOLUTION INTRAVENOUS EVERY 8 HOURS
Qty: 0 | Refills: 0 | Status: DISCONTINUED | OUTPATIENT
Start: 2018-01-25 | End: 2018-01-31

## 2018-01-25 RX ORDER — IPRATROPIUM/ALBUTEROL SULFATE 18-103MCG
3 AEROSOL WITH ADAPTER (GRAM) INHALATION ONCE
Qty: 0 | Refills: 0 | Status: COMPLETED | OUTPATIENT
Start: 2018-01-25 | End: 2018-01-25

## 2018-01-25 RX ORDER — SENNA PLUS 8.6 MG/1
2 TABLET ORAL AT BEDTIME
Qty: 0 | Refills: 0 | Status: DISCONTINUED | OUTPATIENT
Start: 2018-01-25 | End: 2018-01-31

## 2018-01-25 RX ADMIN — Medication 75 MILLIGRAM(S): at 19:30

## 2018-01-25 RX ADMIN — SODIUM CHLORIDE 1000 MILLILITER(S): 9 INJECTION INTRAMUSCULAR; INTRAVENOUS; SUBCUTANEOUS at 16:58

## 2018-01-25 RX ADMIN — Medication 400 MILLIGRAM(S): at 16:59

## 2018-01-25 RX ADMIN — CEFEPIME 100 MILLIGRAM(S): 1 INJECTION, POWDER, FOR SOLUTION INTRAMUSCULAR; INTRAVENOUS at 16:59

## 2018-01-25 RX ADMIN — Medication 250 MILLIGRAM(S): at 18:20

## 2018-01-25 NOTE — H&P ADULT - PROBLEM SELECTOR PLAN 7
- holding PO meds given AMS  - will cover w/ metoprolol 5mg IV q12hrs w/ holding parameters - on NPH 12u TID and NPH 4u at bedtime, hyperglycemia may have occurred during stress response, NPO status for now given AMS,   - most recent FSG at 280s, will cover w/ SSI for now.  -consider endo consult in am for more standardized basal/bolus regimen when pt is tolerating PO intake

## 2018-01-25 NOTE — H&P ADULT - ATTENDING COMMENTS
Pt seen and examined at bedside.  I have precepted this case with house staff and agree with resident note above and have edited it where appropriate.     Care to be assumed by day hospitalist in am  This patient was assigned to me by the hospitalist in charge; my involvement in this case has consisted of the initial history, physical, chart review, and management plan.  This patient was previously unknown to me.

## 2018-01-25 NOTE — ED PROVIDER NOTE - PROGRESS NOTE DETAILS
right pneumonia. On pads. ICU consulted. Neurosurgery aware. clinical pneumonia, type 2 second degree av block on ecg, MICU consulted; patient improving clinically on oxygen and with iv fluid. Yamini CT head stable from prior to discharge. Pt now awake, making eye contact although not responding to questions. 100% saturation O2 on 3L NC. Pending MICU eval. Consulted cardiology for second degree heart block. MICU reject.

## 2018-01-25 NOTE — ED PROVIDER NOTE - INTERPRETATION
sinus rhythm with 1st degree AV block with blocked PAC mobitz 2 2nd degree AV block vs sinus rhythm with 1st degree AV block with blocked PAC

## 2018-01-25 NOTE — ED PROVIDER NOTE - PMH
Atrial fibrillation  on aspirin (last taken 1/2/2018)  Cervical cancer  Stage I per the daughter s/p hysterectomy april 2000 (complicated by: adhesions / pna)  Diabetes mellitus    Endocarditis  2010 (treated w/ antibiotics in [facility])  Hypertension    SDH (subdural hematoma)

## 2018-01-25 NOTE — H&P ADULT - PROBLEM SELECTOR PLAN 6
- on NPH 12u TID and NPH 4u at bedtime, hyperglycemia may have occurred during stress response, NPO status for now given AMS,   - most recent FSG at 280s, will cover w/ SSI for now. - CT head repeat today, redemonstrated hematoma, R midline shift at 5mm  - eval by nsurg in ED, no nsurg intervention at this time; case d/w overnight neurosurg team NO CONTRAINDICATION FOR PPX lovenox  - cont to monitor

## 2018-01-25 NOTE — H&P ADULT - PROBLEM SELECTOR PLAN 8
- appeared to be on lovenox last admission prior to DC and maintained on it during rehab, will cont here despite SDH - holding PO meds given AMS  - will cover w/ metoprolol 5mg IV q12hrs w/ holding parameters

## 2018-01-25 NOTE — ED PROVIDER NOTE - ATTENDING CONTRIBUTION TO CARE
I have examined and evaluated this patient with the above resident or PA, and agree with the documented clinical history, exam and plan.   Briefly: 89yo F with h/o Afib DM HTN, ? dementia, prior SDH s/p craniotomy, and dc'd to rehab, presenting from rehab after cough, AMS today; febrile here, AMS/nonverbal at present (baseline does talk).  ECG shows mobitz II.  CXR obtained, shows RML pna; empirically started on broad spectrum antibiotics.  RVP, cultures sent.  RVP + for flu A, started on tamiflu.  Given significant AMS, new mobitz 2 on ecg, and sepsis, micu consulted.  Will need admission: med/tele vs micu.

## 2018-01-25 NOTE — H&P ADULT - PROBLEM SELECTOR PLAN 4
- holding full AC in setting of recent SDH  - holding PO metop as NPO for metabolic enceph  - will cover w/ metoprolol 5mg - holding full AC in setting of recent SDH  - holding PO metop as NPO for metabolic enceph  - will cover w/ metoprolol 5mg for HR > 110.   - pt also appeared for 1st degree AVB w/ PACs causing a dropped QRS complex.   - eval'd by EP team who rec'd cont'ing rate control.  - monitor on tele fever, tachypnea on presentation  -source is respiratory with fluA+ and pneumonia on imaging  -bcx, sputum cx ordered  -abx as above  lactate ok
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
## 2018-01-25 NOTE — H&P ADULT - NSHPSOCIALHISTORY_GEN_ALL_CORE
Normally lives at home with daughter Patrizia Manuel, now presenting from rehab  EtOH history: Denies  Tobacco History: Denies  Employment: Previously worked as nurse.   Born in [facility]  Came to [place] 1984

## 2018-01-25 NOTE — H&P ADULT - PROBLEM SELECTOR PLAN 10
-20 minutes spent clarifying advanced directives discussed w/ daughter given AMS   -At this time, daughter states patient remains FULL CODE  -All questions answered.
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
## 2018-01-25 NOTE — ED ADULT NURSE NOTE - OBJECTIVE STATEMENT
Pt is an ambulatory 89 yr old female BIBA for two episodes of vomitting and worsening mental status. Pt presents non verbal, unable to follow commands but withdrawing from pain.  PERRL wnl, rivers spontaneously.  NSR on cm at 88 bpm.  Family denies chest pain or sob.  Family states patient was alert and verbal yesterday.  Pt has pmh of subdural hemorrhage on Jan 5th 2018.  Abdomen NT ND with BS+4Q.  Surgical scar on abdomen.   SKIN WDI.  Peripheral pulses +2bl no edema

## 2018-01-25 NOTE — CONSULT NOTE ADULT - SUBJECTIVE AND OBJECTIVE BOX
Patient seen and evaluated @ [facility] ER  Chief Complaint: COUGH    HPI: 89F pmhx noted below presenting with cough. As per daughter who provides HPI at bedside-- pt had been at rehab since discharge and began to have cough productive of brown sputum since last night. Patient was also noted to be more lethargic and was sent to the ED for evaluation. She was found to be septic and work up reveal a likely viral +/- superimposed bacterial PNA. RVP positive for Influenza AH3. CXR shows RLL consolidation.    EKG was noted to have a pause concerning for mobtiz II and consult was called.     PMH:   SDH (subdural hematoma)  Cervical cancer  Endocarditis  Hypertension  Diabetes mellitus  Atrial fibrillation    PSH:   H/O: hysterectomy  History of tracheostomy    Medications:   ALBUTerol/ipratropium for Nebulization. 3 milliLiter(s) Nebulizer once    Allergies:  No Known Allergies    FAMILY HISTORY: Unknown    Social History:  Smoking: No  Alcohol: no  Drugs: no    Review of Systems:  REVIEW OF SYSTEMS:    CONSTITUTIONAL: No weakness, fevers or chills  EYES/ENT: No visual changes;  No dysphagia  NECK: No pain or stiffness  RESPIRATORY: No cough, wheezing, hemoptysis; No shortness of breath  CARDIOVASCULAR: No chest pain or palpitations; No lower extremity edema  GASTROINTESTINAL: No abdominal or epigastric pain. No nausea, vomiting, or hematemesis; No diarrhea or constipation. No melena or hematochezia.  BACK: No back pain  GENITOURINARY: No dysuria, frequency or hematuria  NEUROLOGICAL: No numbness or weakness  SKIN: No itching, burning, rashes, or lesions   All other review of systems is negative unless indicated above.  [ ] 10 point review of systems is otherwise negative except as mentioned above            [x ]Unable to obtain    Physical Exam:  T(C): 37.2 (01-25-18 @ 18:36), Max: 38.4 (01-25-18 @ 16:00)  HR: 83 (01-25-18 @ 18:36) (67 - 88)  BP: 127/61 (01-25-18 @ 18:36) (127/61 - 167/78)  RR: 24 (01-25-18 @ 18:36) (18 - 27)  SpO2: 100% (01-25-18 @ 18:36) (89% - 100%)  Wt(kg): --  GENERAL: No acute distress, well-developed  HEAD:  Atraumatic, Normocephalic  ENT: EOMI, PERRLA, conjunctiva and sclera clear, Neck supple, No JVD, moist mucosa  CHEST/LUNG: Clear to auscultation bilaterally; No wheeze, equal breath sounds bilaterally   BACK: No spinal tenderness  HEART: Regular rate and rhythm; + systolic murmur radiating to carotids.   ABDOMEN: Soft, Nontender, Nondistended; Bowel sounds present  EXTREMITIES:  No clubbing, cyanosis, or edema  PSYCH: Nl behavior, nl affect  NEUROLOGY: AAOx3, non-focal, cranial nerves intact  SKIN: Normal color, No rashes or lesions      Daily     Daily     Cardiovascular Diagnostic Testing:  ECG: SR at 90 , 1st degree block, w/ APCs and blocked APC    Echo: < from: Transthoracic Echocardiogram (01.05.18 @ 11:32) >  Study Date: 1/5/2018  [facility] RDCS  Study quality: Technically difficult  Referring Physician: Walter Gomez MD  BloodPressure: 128/52 mmHg  Height: 157 cm  Weight: 72 kg  BSA: 1.7 m2  ------------------------------------------------------------------------  PROCEDURE: Transthoracic echocardiogram with 2-D, M-Mode  and complete spectral and color flow Doppler.  INDICATION: Abnormal electrocardiogram (ECG) (EKG) (R94.31)  ------------------------------------------------------------------------  Observations:  Mitral Valve: Mitral annular calcification and calcified  mitral leaflets with decreased diastolic opening. Minimal  mitral regurgitation. Mean transmitral valve gradient  equals 5 mm Hg, consistent with moderate mitral stenosis.  (HRabout 80 bpm)  Aortic Valve/Aorta: Aortic valve not well visualized;  appears to be a calcified trileaflet valve with decreased  opening. Peak transaortic valve gradient equals 36 mm Hg,  mean transaortic valve gradient equals 23 mm Hg, estimated  aortic valve area equals 1.2 sqcm (by continuity equation),  aortic valve velocity time integral equals 59 cm,  consistent with moderate aortic stenosis. Consider  additional imaging of the aortic valve such as with  additional TTE or JOY imaging if clinically indicated. No  aortic valve regurgitation seen. Peak left ventricular  outflow tract gradient equals 7 mm Hg, mean gradient is  equal to 4 mm Hg, LVOT velocity time integral equals 24 cm.  LVOT diameter: 1.9 cm.  Left Atrium: Left atrium not well visualized.  Left Ventricle: Endocardium not well visualized; grossly  hyperdynamic left ventricular systolic function.EF  approximately 75% by visual estimation. Normal diastolic  function  Right Heart: Right atrium not well visualized. The right  ventricle is not well visualized; grossly normal right  ventricular systolic function. Tricuspid valve not well  visualized. Minimal tricuspid regurgitation.  Pericardium/Pleura: Normal pericardium with no pericardial  effusion.  Hemodynamic: Estimated right atrial pressure is 8 mm Hg.  Inadequate tricuspid regurgitation Doppler envelope  precludes estimation of RVSP.  ------------------------------------------------------------------------  Conclusions:  Technically difficult study.  1. Mitral annular calcification and calcified mitral  leaflets with decreased diastolic opening. Minimal mitral  regurgitation. Meantransmitral valve gradient equals 5 mm  Hg, consistent with moderate mitral stenosis. (HRabout 80  bpm)  2. Aortic valve not well visualized; appears to be a  calcified trileaflet valve with decreased opening. Peak  transaortic valve gradient equals 36 mm Hg, mean  transaortic valve gradient equals 23 mm Hg, estimated  aortic valve area equals 1.2 sqcm (by continuity equation),  aortic valve velocity time integral equals 59 cm,  consistent with moderate aortic stenosis. Consider  additional imaging of the aortic valve such as with  additional TTE or JOY imaging if clinically indicated. No  aortic valve regurgitation seen.  3. Endocardium not well visualized; grossly hyperdynamic  left ventricular systolic function. EF approximately 75% by  visual estimation.  4. The right ventricle is not well visualized; grossly  normal right ventricular systolic function.  *** No previous Echo exam.  ------------------------------------------------------------------------  Confirmed on  1/5/2018 - 13:12:35 by Wilbur Enriquez M.D.  ------------------------------------------------------------------------    < end of copied text >      Stress Testing:    Cath:    Interpretation of Telemetry:    Imaging:    Labs:                        12.9   6.3   )-----------( 170      ( 25 Jan 2018 16:44 )             36.4     01-25    137  |  98  |  13  ----------------------------<  385<H>  4.7   |  28  |  0.80    Ca    10.2      25 Jan 2018 16:44    TPro  8.1  /  Alb  3.9  /  TBili  0.4  /  DBili  x   /  AST  25  /  ALT  23  /  AlkPhos  115  01-25      CARDIAC MARKERS ( 25 Jan 2018 16:44 )  x     / <0.01 ng/mL / 40 U/L / x     / 1.3 ng/mL

## 2018-01-25 NOTE — H&P ADULT - PROBLEM SELECTOR PROBLEM 6
Type 2 diabetes mellitus with hyperglycemia, with long-term current use of insulin SDH (subdural hematoma)
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
## 2018-01-25 NOTE — H&P ADULT - PROBLEM SELECTOR PLAN 3
- started on tamiflu, will cont for planned 5 day course  - addition plan as above for hypoxic resp failure - appears c/w superimposed bacteria pna on underlying influ AH3 infection, but cannot r/o asp pna as well at this time.   - req'ing 4L NC to maintain sat's in mid 90s, started on broad spec w/ cefepime, azithro, vanco, levoquin  - VBG showed mildly elevated CO2, would likely be close to normal on ABG.   - eval'd by MICU, added PO steroids.   - will cont PO steroids, will cont w/ azithro, vanco, and will add zosyn for improved anaerobic coverage. cont w/ duonebs, chest PT, acapella.  - would consider checking ABG if not improving on above regimen  -f/u bcx, sputum cx

## 2018-01-25 NOTE — ED PROVIDER NOTE - OBJECTIVE STATEMENT
88yo female 89yo female pmh atrial fibrillation on Aspirin 81 mg (last taken 1/2/2018), hypertension, diabetes mellitus, endocarditis in 2010 (treated w/ antibiotics), cervical cancer s/p hysterectomy, recent SDH 3w ago s/p craniotomy p/w coughing brown sputum with increasing AMS throughout the day. Pt unable to provide hx. History by daughter who states pt was normal baseline mental status this morning. Pt coughed several times with dark brown sputum. Daughter denies seeing vomiting.

## 2018-01-25 NOTE — CONSULT NOTE ADULT - ASSESSMENT
89F with PMH recent SDH s/p left craniotomy/evacuation (1/4/18), HTN, T2DM, Afib, moderated aortic stenosis,  prior Endocarditis, Cervical Ca s/p Hysterectomy now presenting with AMS and cough found to be flu positive with radiographic findings suggestive of PNA. Patient was noted to have potential pause on EKG and EP was consulted.    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 P-P intervals map out around the brief pause in a fashion consistent with blocked APC. Patient does have a 1st degree block but no high degree noted otherwise.   - cont monitoring on tele  - no intervention required at this time    # Afib - currently not in afib.   - c/w with rate control. Spoke with daughter Patrizia ([phone] / [phone]). Patient takes metoprolol succinate 50mg QD  - avoid AC or antiplatlets in setting of recent ICH    Remain meds: Lasix 40mg BID, Amlodipine 10mg, Hydralazine 10mg TID, Crestor 10mg, Losartain 100mg QD, Insulin    D MD Yuliya  Cardiology Fellow  [id number]

## 2018-01-25 NOTE — H&P ADULT - PROBLEM SELECTOR PLAN 1
- appears c/w superimposed bacteria pna on underlying influ AH3 infection, but cannot r/o asp pna as well at this time.   - req'ing 4L NC to maintain sat's in mid 90s, started on broad spec w/ cefepime, azithro, vanco, levoquin  - VBG showed mildly elevated CO2, would likely be close to normal on ABG.   - eval'd by MICU, added PO steroids.   - will cont PO steroids, will cont w/ azithro, vanco, and will add zosyn for improved anaerobic coverage. cont w/ duonebs, chest PT, acapella.  - would consider checking ABG if not improving on above regimen - started on tamiflu, will cont for planned 5 day course  - addition plan as below for hypoxic resp failure

## 2018-01-25 NOTE — H&P ADULT - PROBLEM SELECTOR PROBLEM 7
Essential hypertension Type 2 diabetes mellitus with hyperglycemia, with long-term current use of insulin
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
## 2018-01-25 NOTE — ED PROVIDER NOTE - MEDICAL DECISION MAKING DETAILS
89F afib, recent SDH s/p evacuation, HTN, DM p/w AMS, hypoxia, febrile with recent hospitalization concerned for HCAP. CT head due to AMS, recent SDH. CXR, UA/Cx, blood cultures, vanc/cefepime, IVF, tylenol. Supplemental O2 satting a 100%, 3L NC. EKG. 89F afib, recent SDH s/p evacuation, HTN, DM p/w AMS, hypoxia, febrile with recent hospitalization concerned for HCAP vs vent associated pneumonia. CT head due to AMS, recent SDH. CXR, UA/Cx, blood cultures, vanc/cefepime/levaquin, IVF, tylenol. Supplemental O2 satting a 100%, 3L NC. EKG. 89F afib, recent SDH s/p evacuation, HTN, DM p/w AMS, hypoxia, febrile with recent hospitalization concerned for HCAP vs vent associated pneumonia. CT head due to AMS, recent SDH. CXR, UA/Cx, blood cultures, vanc/cefepime/levaquin, IVF, tylenol. Supplemental O2 satting a 100%, 3L NC. EKG with 2nd degree AV block, place on pads.

## 2018-01-25 NOTE — H&P ADULT - PROBLEM SELECTOR PLAN 5
- CT head repeat today, redemonstrated hematoma, R midline shift at 5mm  - eval by nsurg in ED, no nsurg intervention at this time  - cont to monitor - holding full AC in setting of recent SDH  - holding PO metop as NPO for metabolic enceph  - will cover w/ metoprolol 5mg for HR > 110.   - pt also appeared for 1st degree AVB w/ PACs causing a dropped QRS complex, eval'd by EP team who rec'd cont'ing rate control.   - monitor on tele

## 2018-01-25 NOTE — H&P ADULT - PROBLEM SELECTOR PLAN 9
- discussed w/ daughter given AMS, remains full code status. - appeared to be on lovenox last admission prior to DC and maintained on it during rehab, will cont here despite SDH

## 2018-01-25 NOTE — H&P ADULT - NSHPLABSRESULTS_GEN_ALL_CORE
12.9   6.3   )-----------( 170                  36.4       137  |  98  |  13  ----------------------------<  385<H>  4.7   |  28  |  0.80    Ca    10.2      2018 16:44    TPro  8.1  /  Alb  3.9  /  TBili  0.4  /  DBili  x   /  AST  25  /  ALT  23  /  AlkPhos  115        CARDIAC MARKERS ( 2018 16:44 )  x     / <0.01 ng/mL / 40 U/L / x     / 1.3 ng/mL    16:44 - VBG - pH: 7.36  | pCO2: 53    | pO2: 39    | Lactate: 1.6        Urinalysis Basic - ( 2018 16:44 )    Color: PL Yellow / Appearance: Clear / S.018 / pH: x  Gluc: x / Ketone: Trace  / Bili: Negative / Urobili: Negative   Blood: x / Protein: 30 mg/dL / Nitrite: Negative   Leuk Esterase: Negative / RBC: x / WBC 0-2 /HPF   Sq Epi: x / Non Sq Epi: x / Bacteria: x      EKG: sinus rhythm w/ 1st degree HB w/ PACs, no pathologic ST segment elevations or depressions appreciated   CXR: LLL pl eff, RLL opacity c/w pna    < from: CT Head No Cont (18 @ 18:56) >    Redemonstration of an evolving left holohemispheric subdural hematoma   with unchanged rightward midline shift of up to 5 mm.    No new acute intracranial hemorrhage, extra-axial fluid collection, or   hydrocephalus.    < end of copied text >        Labs, CXR, EKG were personally reviewed by me. Labs personally reviewed                   12.9   6.3   )-----------( 170                  36.4       137  |  98  |  13  ----------------------------<  385<H>  4.7   |  28  |  0.80    Ca    10.2      2018 16:44    TPro  8.1  /  Alb  3.9  /  TBili  0.4  /  DBili  x   /  AST  25  /  ALT  23  /  AlkPhos  115        CARDIAC MARKERS ( 2018 16:44 )  x     / <0.01 ng/mL / 40 U/L / x     / 1.3 ng/mL    16:44 - VBG - pH: 7.36  | pCO2: 53    | pO2: 39    | Lactate: 1.6        Urinalysis Basic - ( 2018 16:44 )    Color: PL Yellow / Appearance: Clear / S.018 / pH: x  Gluc: x / Ketone: Trace  / Bili: Negative / Urobili: Negative   Blood: x / Protein: 30 mg/dL / Nitrite: Negative   Leuk Esterase: Negative / RBC: x / WBC 0-2 /HPF   Sq Epi: x / Non Sq Epi: x / Bacteria: x      EKG personally reviewed  sinus rhythm w/ 1st degree HB w/ PACs, no pathologic ST segment elevations or depressions appreciated     Imaging personally reviewed   CXR: RLL opacity c/w pna    < from: CT Head No Cont (18 @ 18:56) >    Redemonstration of an evolving left holohemispheric subdural hematoma   with unchanged rightward midline shift of up to 5 mm.    No new acute intracranial hemorrhage, extra-axial fluid collection, or   hydrocephalus.    < end of copied text >        Labs, CXR, EKG were personally reviewed by me.

## 2018-01-25 NOTE — H&P ADULT - ASSESSMENT
89F PMHx of recent SDH s/p left craniotomy/evacuation (1/4/18), HTN, T2DM, Afib (on ASA 81), prior Endocarditis (2010, treated), Cervical Ca s/p Hysterectomy (2000, c/b adhesions and bowel obstruction and PNA with difficulty weaning off ventilator requiring trach, reversed 2 wks later), recent admission 1/3 - 1/13 for craniotomy, now presenting from rehab with AMS, found w/ metabolic encephalopathy and hypoxic respiratory failure in setting of sepsis (T 101.2, RR 27) w/ (+) AH3 presumed superimposed bacterial pna, also w/ hyperglycemia to 380s w/o gap or sig acidosis.

## 2018-01-26 DIAGNOSIS — A41.9 SEPSIS, UNSPECIFIED ORGANISM: ICD-10-CM

## 2018-01-26 LAB
BASOPHILS # BLD AUTO: 0 K/UL — SIGNIFICANT CHANGE UP (ref 0–0.2)
BASOPHILS NFR BLD AUTO: 0.3 % — SIGNIFICANT CHANGE UP (ref 0–2)
CK MB BLD-MCNC: 1.6 % — SIGNIFICANT CHANGE UP (ref 0–3.5)
CK MB CFR SERPL CALC: 1.7 NG/ML — SIGNIFICANT CHANGE UP (ref 0–3.8)
CK SERPL-CCNC: 109 U/L — SIGNIFICANT CHANGE UP (ref 25–170)
CULTURE RESULTS: NO GROWTH — SIGNIFICANT CHANGE UP
EOSINOPHIL # BLD AUTO: 0 K/UL — SIGNIFICANT CHANGE UP (ref 0–0.5)
EOSINOPHIL NFR BLD AUTO: 0.3 % — SIGNIFICANT CHANGE UP (ref 0–6)
GLUCOSE BLDC GLUCOMTR-MCNC: 332 MG/DL — HIGH (ref 70–99)
GLUCOSE BLDC GLUCOMTR-MCNC: 352 MG/DL — HIGH (ref 70–99)
GLUCOSE BLDC GLUCOMTR-MCNC: 394 MG/DL — HIGH (ref 70–99)
GLUCOSE BLDC GLUCOMTR-MCNC: 394 MG/DL — HIGH (ref 70–99)
HCT VFR BLD CALC: 33.8 % — LOW (ref 34.5–45)
HGB BLD-MCNC: 11.5 G/DL — SIGNIFICANT CHANGE UP (ref 11.5–15.5)
LEGIONELLA AG UR QL: NEGATIVE — SIGNIFICANT CHANGE UP
LYMPHOCYTES # BLD AUTO: 0.3 K/UL — LOW (ref 1–3.3)
LYMPHOCYTES # BLD AUTO: 6 % — LOW (ref 13–44)
MCHC RBC-ENTMCNC: 31.4 PG — SIGNIFICANT CHANGE UP (ref 27–34)
MCHC RBC-ENTMCNC: 33.9 GM/DL — SIGNIFICANT CHANGE UP (ref 32–36)
MCV RBC AUTO: 92.5 FL — SIGNIFICANT CHANGE UP (ref 80–100)
MONOCYTES # BLD AUTO: 0.7 K/UL — SIGNIFICANT CHANGE UP (ref 0–0.9)
MONOCYTES NFR BLD AUTO: 15.7 % — HIGH (ref 2–14)
NEUTROPHILS # BLD AUTO: 3.3 K/UL — SIGNIFICANT CHANGE UP (ref 1.8–7.4)
NEUTROPHILS NFR BLD AUTO: 77.8 % — HIGH (ref 43–77)
PLATELET # BLD AUTO: 137 K/UL — LOW (ref 150–400)
RBC # BLD: 3.66 M/UL — LOW (ref 3.8–5.2)
RBC # FLD: 14.4 % — SIGNIFICANT CHANGE UP (ref 10.3–14.5)
SPECIMEN SOURCE: SIGNIFICANT CHANGE UP
TROPONIN T SERPL-MCNC: <0.01 NG/ML — SIGNIFICANT CHANGE UP (ref 0–0.06)
WBC # BLD: 4.2 K/UL — SIGNIFICANT CHANGE UP (ref 3.8–10.5)
WBC # FLD AUTO: 4.2 K/UL — SIGNIFICANT CHANGE UP (ref 3.8–10.5)

## 2018-01-26 PROCEDURE — 93010 ELECTROCARDIOGRAM REPORT: CPT

## 2018-01-26 PROCEDURE — 99233 SBSQ HOSP IP/OBS HIGH 50: CPT | Mod: GC

## 2018-01-26 RX ORDER — INSULIN LISPRO 100/ML
3 VIAL (ML) SUBCUTANEOUS
Qty: 0 | Refills: 0 | Status: DISCONTINUED | OUTPATIENT
Start: 2018-01-26 | End: 2018-01-27

## 2018-01-26 RX ORDER — METOPROLOL TARTRATE 50 MG
12.5 TABLET ORAL
Qty: 0 | Refills: 0 | Status: DISCONTINUED | OUTPATIENT
Start: 2018-01-26 | End: 2018-01-31

## 2018-01-26 RX ORDER — METOPROLOL TARTRATE 50 MG
5 TABLET ORAL EVERY 6 HOURS
Qty: 0 | Refills: 0 | Status: DISCONTINUED | OUTPATIENT
Start: 2018-01-25 | End: 2018-01-26

## 2018-01-26 RX ORDER — INSULIN GLARGINE 100 [IU]/ML
10 INJECTION, SOLUTION SUBCUTANEOUS AT BEDTIME
Qty: 0 | Refills: 0 | Status: DISCONTINUED | OUTPATIENT
Start: 2018-01-26 | End: 2018-01-27

## 2018-01-26 RX ADMIN — Medication 5: at 22:22

## 2018-01-26 RX ADMIN — AZITHROMYCIN 250 MILLIGRAM(S): 500 TABLET, FILM COATED ORAL at 22:12

## 2018-01-26 RX ADMIN — PIPERACILLIN AND TAZOBACTAM 25 GRAM(S): 4; .5 INJECTION, POWDER, LYOPHILIZED, FOR SOLUTION INTRAVENOUS at 00:01

## 2018-01-26 RX ADMIN — Medication 3 MILLILITER(S): at 05:21

## 2018-01-26 RX ADMIN — Medication 4: at 18:36

## 2018-01-26 RX ADMIN — LACOSAMIDE 150 MILLIGRAM(S): 50 TABLET ORAL at 05:21

## 2018-01-26 RX ADMIN — Medication 3 MILLILITER(S): at 00:02

## 2018-01-26 RX ADMIN — Medication 3: at 00:13

## 2018-01-26 RX ADMIN — SENNA PLUS 2 TABLET(S): 8.6 TABLET ORAL at 22:11

## 2018-01-26 RX ADMIN — Medication 4: at 06:47

## 2018-01-26 RX ADMIN — PIPERACILLIN AND TAZOBACTAM 25 GRAM(S): 4; .5 INJECTION, POWDER, LYOPHILIZED, FOR SOLUTION INTRAVENOUS at 08:17

## 2018-01-26 RX ADMIN — Medication 40 MILLIGRAM(S): at 00:45

## 2018-01-26 RX ADMIN — AZITHROMYCIN 250 MILLIGRAM(S): 500 TABLET, FILM COATED ORAL at 00:54

## 2018-01-26 RX ADMIN — Medication 3 MILLILITER(S): at 12:06

## 2018-01-26 RX ADMIN — Medication 3 MILLILITER(S): at 22:13

## 2018-01-26 RX ADMIN — LACOSAMIDE 150 MILLIGRAM(S): 50 TABLET ORAL at 22:09

## 2018-01-26 RX ADMIN — Medication 250 MILLIGRAM(S): at 07:26

## 2018-01-26 RX ADMIN — PIPERACILLIN AND TAZOBACTAM 25 GRAM(S): 4; .5 INJECTION, POWDER, LYOPHILIZED, FOR SOLUTION INTRAVENOUS at 13:16

## 2018-01-26 RX ADMIN — Medication 3 MILLILITER(S): at 18:37

## 2018-01-26 RX ADMIN — Medication 3 UNIT(S): at 18:36

## 2018-01-26 RX ADMIN — Medication 250 MILLIGRAM(S): at 19:29

## 2018-01-26 RX ADMIN — POLYETHYLENE GLYCOL 3350 17 GRAM(S): 17 POWDER, FOR SOLUTION ORAL at 18:37

## 2018-01-26 RX ADMIN — PIPERACILLIN AND TAZOBACTAM 25 GRAM(S): 4; .5 INJECTION, POWDER, LYOPHILIZED, FOR SOLUTION INTRAVENOUS at 22:11

## 2018-01-26 RX ADMIN — INSULIN GLARGINE 10 UNIT(S): 100 INJECTION, SOLUTION SUBCUTANEOUS at 22:22

## 2018-01-26 RX ADMIN — Medication 5: at 12:04

## 2018-01-26 RX ADMIN — Medication 30 MILLIGRAM(S): at 05:21

## 2018-01-26 RX ADMIN — Medication 30 MILLIGRAM(S): at 18:37

## 2018-01-26 RX ADMIN — Medication 100 MILLIGRAM(S): at 12:06

## 2018-01-26 NOTE — PROGRESS NOTE ADULT - PROBLEM SELECTOR PLAN 1
- resolved, pt afebrile, no longer tachypnic  - appears c/w superimposed bacteria pna on underlying influ AH3 infection, but cannot r/o asp pna as well at this time.   - initially started on low dose prednisone, now d/c, will c/w duonebs  - on broad spectrum now, will order MRSA swab to d/c vanc and f/u legionella to d/c azithromycin   - f/u blood and urine cx

## 2018-01-26 NOTE — CONSULT NOTE ADULT - ASSESSMENT
89F h/o SDH s/p evacuation now here with AMS 2/2 infectious metabolic encephalopathy  - CTH stable. No acute neurosurgical intervention at this time. Please re consult as needed. Repeat CTH if patient has a significant change in exam

## 2018-01-26 NOTE — PROGRESS NOTE ADULT - PROBLEM SELECTOR PLAN 5
- on NPH 12u TID and NPH 4u at bedtime, hyperglycemia may have occurred during stress response, NPO status for now given AMS,   - FS close to 400 this Am, currently only ISS while npo  - once oral intake resumed will resume bolus/basal insulin.

## 2018-01-26 NOTE — PROGRESS NOTE ADULT - PROBLEM SELECTOR PLAN 2
- tamiflu today Day 2/5  - respiratory droplet precaution  - will clarify if flu vaccine administered

## 2018-01-26 NOTE — PROVIDER CONTACT NOTE (OTHER) - ACTION/TREATMENT ORDERED:
Will follow up with team. Continue to monitor. EKG ordered. Will follow up with team. Continue to monitor.
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
## 2018-01-26 NOTE — PROGRESS NOTE ADULT - PROBLEM SELECTOR PLAN 3
- no events on tele overnight, concern for mobitz II on EKG, per EP eval most likely APC  - is on metoprolol at home, will restart once pt cleared for oral medication, currently rate controlled 50 - 70  - if pt RVR will adminstered lopressor IV as necessary.
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
## 2018-01-27 LAB
GLUCOSE BLDC GLUCOMTR-MCNC: 219 MG/DL — HIGH (ref 70–99)
GLUCOSE BLDC GLUCOMTR-MCNC: 313 MG/DL — HIGH (ref 70–99)
GLUCOSE BLDC GLUCOMTR-MCNC: 354 MG/DL — HIGH (ref 70–99)
GLUCOSE BLDC GLUCOMTR-MCNC: 408 MG/DL — HIGH (ref 70–99)
GLUCOSE BLDC GLUCOMTR-MCNC: 418 MG/DL — HIGH (ref 70–99)
GLUCOSE BLDC GLUCOMTR-MCNC: 428 MG/DL — HIGH (ref 70–99)
GLUCOSE BLDC GLUCOMTR-MCNC: 430 MG/DL — HIGH (ref 70–99)
HCT VFR BLD CALC: 36.5 % — SIGNIFICANT CHANGE UP (ref 34.5–45)
HGB BLD-MCNC: 11.8 G/DL — SIGNIFICANT CHANGE UP (ref 11.5–15.5)
MCHC RBC-ENTMCNC: 29.4 PG — SIGNIFICANT CHANGE UP (ref 27–34)
MCHC RBC-ENTMCNC: 32.3 GM/DL — SIGNIFICANT CHANGE UP (ref 32–36)
MCV RBC AUTO: 91 FL — SIGNIFICANT CHANGE UP (ref 80–100)
MRSA PCR RESULT.: SIGNIFICANT CHANGE UP
PLATELET # BLD AUTO: 135 K/UL — LOW (ref 150–400)
RBC # BLD: 4.01 M/UL — SIGNIFICANT CHANGE UP (ref 3.8–5.2)
RBC # FLD: 15.2 % — HIGH (ref 10.3–14.5)
S AUREUS DNA NOSE QL NAA+PROBE: SIGNIFICANT CHANGE UP
VANCOMYCIN TROUGH SERPL-MCNC: 11.9 UG/ML — SIGNIFICANT CHANGE UP (ref 10–20)
WBC # BLD: 2.86 K/UL — LOW (ref 3.8–10.5)
WBC # FLD AUTO: 2.86 K/UL — LOW (ref 3.8–10.5)

## 2018-01-27 PROCEDURE — 99233 SBSQ HOSP IP/OBS HIGH 50: CPT | Mod: GC

## 2018-01-27 RX ORDER — INSULIN LISPRO 100/ML
6 VIAL (ML) SUBCUTANEOUS ONCE
Qty: 0 | Refills: 0 | Status: COMPLETED | OUTPATIENT
Start: 2018-01-27 | End: 2018-01-27

## 2018-01-27 RX ORDER — CALCIUM CARBONATE 500(1250)
1 TABLET ORAL ONCE
Qty: 0 | Refills: 0 | Status: COMPLETED | OUTPATIENT
Start: 2018-01-27 | End: 2018-01-27

## 2018-01-27 RX ORDER — INSULIN LISPRO 100/ML
5 VIAL (ML) SUBCUTANEOUS
Qty: 0 | Refills: 0 | Status: DISCONTINUED | OUTPATIENT
Start: 2018-01-27 | End: 2018-01-31

## 2018-01-27 RX ORDER — INSULIN LISPRO 100/ML
VIAL (ML) SUBCUTANEOUS
Qty: 0 | Refills: 0 | Status: DISCONTINUED | OUTPATIENT
Start: 2018-01-27 | End: 2018-01-31

## 2018-01-27 RX ORDER — AMLODIPINE BESYLATE 2.5 MG/1
10 TABLET ORAL DAILY
Qty: 0 | Refills: 0 | Status: DISCONTINUED | OUTPATIENT
Start: 2018-01-27 | End: 2018-01-31

## 2018-01-27 RX ORDER — INSULIN GLARGINE 100 [IU]/ML
12 INJECTION, SOLUTION SUBCUTANEOUS AT BEDTIME
Qty: 0 | Refills: 0 | Status: DISCONTINUED | OUTPATIENT
Start: 2018-01-27 | End: 2018-01-31

## 2018-01-27 RX ADMIN — Medication 6 UNIT(S): at 12:04

## 2018-01-27 RX ADMIN — POLYETHYLENE GLYCOL 3350 17 GRAM(S): 17 POWDER, FOR SOLUTION ORAL at 05:36

## 2018-01-27 RX ADMIN — Medication 5 UNIT(S): at 17:44

## 2018-01-27 RX ADMIN — SENNA PLUS 2 TABLET(S): 8.6 TABLET ORAL at 21:42

## 2018-01-27 RX ADMIN — Medication 12.5 MILLIGRAM(S): at 17:46

## 2018-01-27 RX ADMIN — Medication 10: at 17:43

## 2018-01-27 RX ADMIN — Medication 3 MILLILITER(S): at 11:55

## 2018-01-27 RX ADMIN — LACOSAMIDE 150 MILLIGRAM(S): 50 TABLET ORAL at 21:42

## 2018-01-27 RX ADMIN — Medication 3 MILLILITER(S): at 17:45

## 2018-01-27 RX ADMIN — Medication 40 MILLIGRAM(S): at 05:36

## 2018-01-27 RX ADMIN — PIPERACILLIN AND TAZOBACTAM 25 GRAM(S): 4; .5 INJECTION, POWDER, LYOPHILIZED, FOR SOLUTION INTRAVENOUS at 21:43

## 2018-01-27 RX ADMIN — Medication 100 MILLIGRAM(S): at 11:55

## 2018-01-27 RX ADMIN — Medication 6 UNIT(S): at 14:17

## 2018-01-27 RX ADMIN — PIPERACILLIN AND TAZOBACTAM 25 GRAM(S): 4; .5 INJECTION, POWDER, LYOPHILIZED, FOR SOLUTION INTRAVENOUS at 14:18

## 2018-01-27 RX ADMIN — Medication 30 MILLIGRAM(S): at 05:36

## 2018-01-27 RX ADMIN — Medication 3 MILLILITER(S): at 05:36

## 2018-01-27 RX ADMIN — PIPERACILLIN AND TAZOBACTAM 25 GRAM(S): 4; .5 INJECTION, POWDER, LYOPHILIZED, FOR SOLUTION INTRAVENOUS at 05:36

## 2018-01-27 RX ADMIN — LACOSAMIDE 150 MILLIGRAM(S): 50 TABLET ORAL at 08:43

## 2018-01-27 RX ADMIN — AMLODIPINE BESYLATE 10 MILLIGRAM(S): 2.5 TABLET ORAL at 11:54

## 2018-01-27 RX ADMIN — Medication 6: at 08:26

## 2018-01-27 RX ADMIN — Medication 12.5 MILLIGRAM(S): at 05:38

## 2018-01-27 RX ADMIN — Medication 3 UNIT(S): at 08:27

## 2018-01-27 RX ADMIN — INSULIN GLARGINE 12 UNIT(S): 100 INJECTION, SOLUTION SUBCUTANEOUS at 21:51

## 2018-01-27 RX ADMIN — Medication 30 MILLIGRAM(S): at 17:46

## 2018-01-27 RX ADMIN — POLYETHYLENE GLYCOL 3350 17 GRAM(S): 17 POWDER, FOR SOLUTION ORAL at 17:46

## 2018-01-27 RX ADMIN — Medication 1 TABLET(S): at 21:42

## 2018-01-27 NOTE — PROGRESS NOTE ADULT - PROBLEM SELECTOR PLAN 1
- resolved, pt afebrile, no tachypnea  - presentation of metabolic encephalopathy most likely 2/2 sepsis from infuenza on superimposed infection given RLL consolidation on imaging  - MRSA PCR negative, legionella negative, will d/c vancomycin and azithromycin  - f/u BCx, no growth to date - resolved, pt afebrile, no tachypnea  - presentation of metabolic encephalopathy most likely 2/2 sepsis from infuenza on superimposed infection given RLL consolidation on imaging  - MRSA PCR negative, legionella negative, will d/c vancomycin and azithromycin  - f/u BCx, no growth to date  - c/w Zosyn

## 2018-01-27 NOTE — PROGRESS NOTE ADULT - PROBLEM SELECTOR PLAN 5
- on NPH 12u TID and NPH 4u at bedtime at home  - here pt was getting prednisone 40 mg, d/c today. POCT 430 this AM, looking through values previous admission sugars controlled in 200's, elevation here most likely 2/2 to steroid use  - started on 10 units lantus and 3 units premeal today, c/w ISS will trend premeal up as necessary. No AG on BMP - on NPH 12u TID and NPH 4u at bedtime at home  - here pt was getting prednisone 40 mg, d/c today. POCT 430 this AM, looking through values previous admission sugars controlled in 200's, elevation here most likely 2/2 to steroid use. d/c steroids for now as no wheezing on exam  - Increase insulin coverage to Lantus 12 u QHS, Humalog 5u premeal, and moderate sliding scale coverage

## 2018-01-27 NOTE — PROGRESS NOTE ADULT - PROBLEM SELECTOR PLAN 2
- tamiflu today Day 3/5  - respiratory droplet precaution  - pt has productive cough, remains afebrile

## 2018-01-27 NOTE — PROGRESS NOTE ADULT - SUBJECTIVE AND OBJECTIVE BOX
Patient is a 89y old  Female who presents with a chief complaint of 89F p/w AMS x 1 day (2018 22:09)      SUBJECTIVE / OVERNIGHT EVENTS:    Patient seen and examined at bedside. A+Ox3 this AM. Reports continued productive cough this AM, tolerating food, had small BM this AM. In no pain. She does not feel she is in any respiratory distress.    MEDICATIONS  (STANDING):  ALBUTerol/ipratropium for Nebulization 3 milliLiter(s) Nebulizer every 6 hours  azithromycin  IVPB      azithromycin  IVPB 500 milliGRAM(s) IV Intermittent every 24 hours  dextrose 50% Injectable 12.5 Gram(s) IV Push once  dextrose 50% Injectable 25 Gram(s) IV Push once  dextrose 50% Injectable 25 Gram(s) IV Push once  docusate sodium 100 milliGRAM(s) Oral daily  insulin glargine Injectable (LANTUS) 10 Unit(s) SubCutaneous at bedtime  insulin lispro (HumaLOG) corrective regimen sliding scale   SubCutaneous every 6 hours  insulin lispro Injectable (HumaLOG) 3 Unit(s) SubCutaneous three times a day before meals  lacosamide 150 milliGRAM(s) Oral two times a day  metoprolol     tartrate 12.5 milliGRAM(s) Oral two times a day  oseltamivir 30 milliGRAM(s) Oral every 12 hours  piperacillin/tazobactam IVPB. 3.375 Gram(s) IV Intermittent every 8 hours  polyethylene glycol 3350 17 Gram(s) Oral every 12 hours  predniSONE   Tablet 40 milliGRAM(s) Oral daily  senna 2 Tablet(s) Oral at bedtime  vancomycin  IVPB 1000 milliGRAM(s) IV Intermittent every 12 hours    MEDICATIONS  (PRN):  acetaminophen   Tablet 650 milliGRAM(s) Oral every 6 hours PRN For Temp greater than 38 C (100.4 F)  acetaminophen   Tablet. 650 milliGRAM(s) Oral every 6 hours PRN Mild Pain (1 - 3)  dextrose Gel 1 Dose(s) Oral once PRN Blood Glucose LESS THAN 70 milliGRAM(s)/deciliter  glucagon  Injectable 1 milliGRAM(s) IntraMuscular once PRN Glucose LESS THAN 70 milligrams/deciliter      Vital Signs Last 24 Hrs  T(C): 37 (2018 04:27), Max: 37.4 (2018 12:30)  T(F): 98.6 (2018 04:27), Max: 99.4 (2018 12:30)  HR: 89 (2018 04:27) (73 - 111)  BP: 136/81 (2018 04:27) (110/73 - 169/72)  BP(mean): --  RR: 18 (2018 04:27) (18 - 18)  SpO2: 98% (2018 04:27) (96% - 98%)  CAPILLARY BLOOD GLUCOSE      POCT Blood Glucose.: 430 mg/dL (2018 08:19)  POCT Blood Glucose.: 428 mg/dL (2018 08:17)  POCT Blood Glucose.: 352 mg/dL (2018 21:01)  POCT Blood Glucose.: 332 mg/dL (2018 17:56)  POCT Blood Glucose.: 394 mg/dL (2018 11:42)  POCT Blood Glucose.: 394 mg/dL (2018 11:40)    I&O's Summary    2018 07:01  -  2018 07:00  --------------------------------------------------------  IN: 200 mL / OUT: 0 mL / NET: 200 mL    General: WN/WD NAD with NC in place at 4L  Neurology: A&Ox3, nonfocal, BERGER x 4  Eyes: PERRLA/ EOMI, Gross vision intact  ENT/Neck: Neck supple, trachea midline, No JVD, Gross hearing intact  Respiratory: b/l basilar crackles, No wheezing, rales, rhonchi  CV: RRR, S1S2, holosystolic murmur right parasternal, rubs or gallops  Abdominal: Soft, NT, ND +BS,   Extremities: No edema, + peripheral pulses  Skin: No Rashes, Hematoma, Ecchymosis  Incisions:   Tubes:      LABS:                        11.5   4.2   )-----------( 137      ( 2018 03:10 )             33.8         137  |  98  |  10  ----------------------------<  449<H>  4.0   |  24  |  0.71    Ca    9.3      2018 09:22  Phos  2.5       Mg     2.0         TPro  7.1  /  Alb  3.4  /  TBili  0.4  /  DBili  x   /  AST  27  /  ALT  22  /  AlkPhos  101        CARDIAC MARKERS ( 2018 01:57 )  x     / <0.01 ng/mL / 109 U/L / x     / 1.7 ng/mL  CARDIAC MARKERS ( 2018 16:44 )  x     / <0.01 ng/mL / 40 U/L / x     / 1.3 ng/mL      Urinalysis Basic - ( 2018 16:44 )    Color: PL Yellow / Appearance: Clear / S.018 / pH: x  Gluc: x / Ketone: Trace  / Bili: Negative / Urobili: Negative   Blood: x / Protein: 30 mg/dL / Nitrite: Negative   Leuk Esterase: Negative / RBC: x / WBC 0-2 /HPF   Sq Epi: x / Non Sq Epi: x / Bacteria: x        RADIOLOGY & ADDITIONAL TESTS:    Imaging Personally Reviewed:    Consultant(s) Notes Reviewed:      Care Discussed with Consultants/Other Providers: Patient is a 89y old  Female who presents with a chief complaint of 89F p/w AMS x 1 day (2018 22:09)      SUBJECTIVE / OVERNIGHT EVENTS:    Patient seen and examined at bedside. A+Ox3 this AM. Reports continued productive cough this AM, tolerating food, had small BM this AM. In no pain. She does not feel she is in any respiratory distress.    MEDICATIONS  (STANDING):  ALBUTerol/ipratropium for Nebulization 3 milliLiter(s) Nebulizer every 6 hours  azithromycin  IVPB      azithromycin  IVPB 500 milliGRAM(s) IV Intermittent every 24 hours  dextrose 50% Injectable 12.5 Gram(s) IV Push once  dextrose 50% Injectable 25 Gram(s) IV Push once  dextrose 50% Injectable 25 Gram(s) IV Push once  docusate sodium 100 milliGRAM(s) Oral daily  insulin glargine Injectable (LANTUS) 10 Unit(s) SubCutaneous at bedtime  insulin lispro (HumaLOG) corrective regimen sliding scale   SubCutaneous every 6 hours  insulin lispro Injectable (HumaLOG) 3 Unit(s) SubCutaneous three times a day before meals  lacosamide 150 milliGRAM(s) Oral two times a day  metoprolol     tartrate 12.5 milliGRAM(s) Oral two times a day  oseltamivir 30 milliGRAM(s) Oral every 12 hours  piperacillin/tazobactam IVPB. 3.375 Gram(s) IV Intermittent every 8 hours  polyethylene glycol 3350 17 Gram(s) Oral every 12 hours  predniSONE   Tablet 40 milliGRAM(s) Oral daily  senna 2 Tablet(s) Oral at bedtime  vancomycin  IVPB 1000 milliGRAM(s) IV Intermittent every 12 hours    MEDICATIONS  (PRN):  acetaminophen   Tablet 650 milliGRAM(s) Oral every 6 hours PRN For Temp greater than 38 C (100.4 F)  acetaminophen   Tablet. 650 milliGRAM(s) Oral every 6 hours PRN Mild Pain (1 - 3)  dextrose Gel 1 Dose(s) Oral once PRN Blood Glucose LESS THAN 70 milliGRAM(s)/deciliter  glucagon  Injectable 1 milliGRAM(s) IntraMuscular once PRN Glucose LESS THAN 70 milligrams/deciliter      Vital Signs Last 24 Hrs  T(C): 37 (2018 04:27), Max: 37.4 (2018 12:30)  T(F): 98.6 (2018 04:27), Max: 99.4 (2018 12:30)  HR: 89 (2018 04:27) (73 - 111)  BP: 136/81 (2018 04:27) (110/73 - 169/72)  BP(mean): --  RR: 18 (2018 04:27) (18 - 18)  SpO2: 98% (2018 04:27) (96% - 98%)  CAPILLARY BLOOD GLUCOSE      POCT Blood Glucose.: 430 mg/dL (2018 08:19)  POCT Blood Glucose.: 428 mg/dL (2018 08:17)  POCT Blood Glucose.: 352 mg/dL (2018 21:01)  POCT Blood Glucose.: 332 mg/dL (2018 17:56)  POCT Blood Glucose.: 394 mg/dL (2018 11:42)  POCT Blood Glucose.: 394 mg/dL (2018 11:40)    I&O's Summary    2018 07:01  -  2018 07:00  --------------------------------------------------------  IN: 200 mL / OUT: 0 mL / NET: 200 mL    General: WN/WD NAD with NC in place at 4L  Neurology: A&Ox3, nonfocal, BERGER x 4  Eyes: PERRLA/ EOMI, Gross vision intact  ENT/Neck: Neck supple, trachea midline, No JVD, Gross hearing intact  Respiratory: b/l basilar crackles, No wheezing, rales, rhonchi  CV: RRR, S1S2, holosystolic murmur right parasternal, rubs or gallops  Abdominal: Soft, NT, ND +BS,   Extremities: No edema, + peripheral pulses  Skin: No Rashes, Hematoma, Ecchymosis  Incisions:   Tubes:      LABS:                        11.5   4.2   )-----------( 137      ( 2018 03:10 )             33.8         137  |  98  |  10  ----------------------------<  449<H>  4.0   |  24  |  0.71    Ca    9.3      2018 09:22  Phos  2.5       Mg     2.0         TPro  7.1  /  Alb  3.4  /  TBili  0.4  /  DBili  x   /  AST  27  /  ALT  22  /  AlkPhos  101        CARDIAC MARKERS ( 2018 01:57 )  x     / <0.01 ng/mL / 109 U/L / x     / 1.7 ng/mL  CARDIAC MARKERS ( 2018 16:44 )  x     / <0.01 ng/mL / 40 U/L / x     / 1.3 ng/mL      Urinalysis Basic - ( 2018 16:44 )    Color: PL Yellow / Appearance: Clear / S.018 / pH: x  Gluc: x / Ketone: Trace  / Bili: Negative / Urobili: Negative   Blood: x / Protein: 30 mg/dL / Nitrite: Negative   Leuk Esterase: Negative / RBC: x / WBC 0-2 /HPF   Sq Epi: x / Non Sq Epi: x / Bacteria: x        RADIOLOGY & ADDITIONAL TESTS:    Imaging Personally Reviewed:    Consultant(s) Notes Reviewed:  Neurosurgery    Care Discussed with Consultants/Other Providers:

## 2018-01-27 NOTE — PROGRESS NOTE ADULT - ATTENDING COMMENTS
Agree with above. c/w Zosyn and Tamiflu for pna/influenza. FS elevated, insulin regimen increased. Discussed with RN that if FS drops to 100s to notify MD so that we can monitor FS closer and prevent and hypoglycemia, given we increased her insulin regimen and stopped Prednisone.

## 2018-01-27 NOTE — PROGRESS NOTE ADULT - PROBLEM SELECTOR PLAN 3
- pt in a-flutter yesterday on telemetry rate 100-120  - now on metoprolol 12.5 mg bid for rate control, overnight rate 73-89  - hold AC for now in setting of SDH

## 2018-01-28 DIAGNOSIS — R73.9 HYPERGLYCEMIA, UNSPECIFIED: ICD-10-CM

## 2018-01-28 LAB
GLUCOSE BLDC GLUCOMTR-MCNC: 140 MG/DL — HIGH (ref 70–99)
GLUCOSE BLDC GLUCOMTR-MCNC: 149 MG/DL — HIGH (ref 70–99)
GLUCOSE BLDC GLUCOMTR-MCNC: 243 MG/DL — HIGH (ref 70–99)
GLUCOSE BLDC GLUCOMTR-MCNC: 317 MG/DL — HIGH (ref 70–99)
GLUCOSE BLDC GLUCOMTR-MCNC: 332 MG/DL — HIGH (ref 70–99)

## 2018-01-28 PROCEDURE — 99233 SBSQ HOSP IP/OBS HIGH 50: CPT | Mod: GC

## 2018-01-28 RX ADMIN — Medication 3 MILLILITER(S): at 06:56

## 2018-01-28 RX ADMIN — PIPERACILLIN AND TAZOBACTAM 25 GRAM(S): 4; .5 INJECTION, POWDER, LYOPHILIZED, FOR SOLUTION INTRAVENOUS at 14:30

## 2018-01-28 RX ADMIN — Medication 100 MILLIGRAM(S): at 12:20

## 2018-01-28 RX ADMIN — AMLODIPINE BESYLATE 10 MILLIGRAM(S): 2.5 TABLET ORAL at 06:56

## 2018-01-28 RX ADMIN — Medication 3 MILLILITER(S): at 00:15

## 2018-01-28 RX ADMIN — LACOSAMIDE 150 MILLIGRAM(S): 50 TABLET ORAL at 18:19

## 2018-01-28 RX ADMIN — Medication 3 MILLILITER(S): at 12:20

## 2018-01-28 RX ADMIN — Medication 30 MILLIGRAM(S): at 06:56

## 2018-01-28 RX ADMIN — Medication 4: at 12:20

## 2018-01-28 RX ADMIN — Medication 5 UNIT(S): at 07:48

## 2018-01-28 RX ADMIN — Medication 30 MILLIGRAM(S): at 18:19

## 2018-01-28 RX ADMIN — Medication 12.5 MILLIGRAM(S): at 06:56

## 2018-01-28 RX ADMIN — PIPERACILLIN AND TAZOBACTAM 25 GRAM(S): 4; .5 INJECTION, POWDER, LYOPHILIZED, FOR SOLUTION INTRAVENOUS at 06:56

## 2018-01-28 RX ADMIN — Medication 3 MILLILITER(S): at 18:17

## 2018-01-28 RX ADMIN — Medication 8: at 07:49

## 2018-01-28 RX ADMIN — LACOSAMIDE 150 MILLIGRAM(S): 50 TABLET ORAL at 06:55

## 2018-01-28 RX ADMIN — Medication 12.5 MILLIGRAM(S): at 18:19

## 2018-01-28 RX ADMIN — Medication 5 UNIT(S): at 18:17

## 2018-01-28 RX ADMIN — Medication 5 UNIT(S): at 12:20

## 2018-01-28 NOTE — DISCHARGE NOTE ADULT - PLAN OF CARE
Resolution of Symptoms You were admitted to the hospital for pneumonia, and found to have influenza.  You were given a course of Tamiflu which is an anti-viral that helps lessen the severity of influenza in certain cases.  At this point, you do not need further treatment.  Please participate as fully as possible in all rehabilitation exercises as this will help aerate your lungs and prevent further infections. You were treated at the hospital for pneumonia, which may have been caused by aspiration (fluid going down the trachea instead of the esophagus).  You were given 6 days of IV antibiotics in the hospital.  Please take your final day of oral antibiotics at the rehabilitation center tomorrow.  Please keep the bed at least 60 degrees inclined while eating to prevent any further aspiration events. You were recently admitted to the hospital for a subdural hematoma.  During this hospital admission, your head was re-scanned and the hematoma was found to be stable.  Please continue taking your Vimpat for seizure prevention, as subdural hematomas can often cause seizures. You have a history of Type 2 Diabetes.  Please continue taking your insulin, and monitoring your blood sugar to ensure that your sugars do not routinely become > 200.  Please continue seeing your primary care physician for management of your diabetes. You were treated at the hospital for pneumonia, which may have been caused by aspiration (fluid going down the trachea instead of the esophagus).  You were given 6 days of IV antibiotics in the hospital.  You do not need to take any further antibiotics.  Please keep the bed at least 60 degrees inclined while eating to prevent any further aspiration events.

## 2018-01-28 NOTE — PROGRESS NOTE ADULT - PROBLEM SELECTOR PLAN 2
- Tamiflu today Day 4/5  - respiratory droplet precaution  - pt has productive cough but improving, remains afebrile

## 2018-01-28 NOTE — DISCHARGE NOTE ADULT - CARE PROVIDERS DIRECT ADDRESSES
[email or web address]

## 2018-01-28 NOTE — DISCHARGE NOTE ADULT - CARE PLAN
Principal Discharge DX:	Influenza A  Secondary Diagnosis:	Pneumonia  Secondary Diagnosis:	SDH (subdural hematoma)  Secondary Diagnosis:	Type 2 diabetes mellitus with hyperglycemia, with long-term current use of insulin Principal Discharge DX:	Influenza A  Goal:	Resolution of Symptoms  Assessment and plan of treatment:	You were admitted to the hospital for pneumonia, and found to have influenza.  You were given a course of Tamiflu which is an anti-viral that helps lessen the severity of influenza in certain cases.  At this point, you do not need further treatment.  Please participate as fully as possible in all rehabilitation exercises as this will help aerate your lungs and prevent further infections.  Secondary Diagnosis:	Pneumonia  Assessment and plan of treatment:	You were treated at the hospital for pneumonia, which may have been caused by aspiration (fluid going down the trachea instead of the esophagus).  You were given 6 days of IV antibiotics in the hospital.  Please take your final day of oral antibiotics at the rehabilitation center tomorrow.  Please keep the bed at least 60 degrees inclined while eating to prevent any further aspiration events.  Secondary Diagnosis:	SDH (subdural hematoma)  Assessment and plan of treatment:	You were recently admitted to the hospital for a subdural hematoma.  During this hospital admission, your head was re-scanned and the hematoma was found to be stable.  Please continue taking your Vimpat for seizure prevention, as subdural hematomas can often cause seizures.  Secondary Diagnosis:	Type 2 diabetes mellitus with hyperglycemia, with long-term current use of insulin  Assessment and plan of treatment:	You have a history of Type 2 Diabetes.  Please continue taking your insulin, and monitoring your blood sugar to ensure that your sugars do not routinely become > 200.  Please continue seeing your primary care physician for management of your diabetes. Principal Discharge DX:	Influenza A  Goal:	Resolution of Symptoms  Assessment and plan of treatment:	You were admitted to the hospital for pneumonia, and found to have influenza.  You were given a course of Tamiflu which is an anti-viral that helps lessen the severity of influenza in certain cases.  At this point, you do not need further treatment.  Please participate as fully as possible in all rehabilitation exercises as this will help aerate your lungs and prevent further infections.  Secondary Diagnosis:	Pneumonia  Assessment and plan of treatment:	You were treated at the hospital for pneumonia, which may have been caused by aspiration (fluid going down the trachea instead of the esophagus).  You were given 6 days of IV antibiotics in the hospital.  You do not need to take any further antibiotics.  Please keep the bed at least 60 degrees inclined while eating to prevent any further aspiration events.  Secondary Diagnosis:	SDH (subdural hematoma)  Assessment and plan of treatment:	You were recently admitted to the hospital for a subdural hematoma.  During this hospital admission, your head was re-scanned and the hematoma was found to be stable.  Please continue taking your Vimpat for seizure prevention, as subdural hematomas can often cause seizures.  Secondary Diagnosis:	Type 2 diabetes mellitus with hyperglycemia, with long-term current use of insulin  Assessment and plan of treatment:	You have a history of Type 2 Diabetes.  Please continue taking your insulin, and monitoring your blood sugar to ensure that your sugars do not routinely become > 200.  Please continue seeing your primary care physician for management of your diabetes.

## 2018-01-28 NOTE — DISCHARGE NOTE ADULT - SECONDARY DIAGNOSIS.
Pneumonia SDH (subdural hematoma) Type 2 diabetes mellitus with hyperglycemia, with long-term current use of insulin

## 2018-01-28 NOTE — DISCHARGE NOTE ADULT - MEDICATION SUMMARY - MEDICATIONS TO CHANGE
I will SWITCH the dose or number of times a day I take the medications listed below when I get home from the hospital:    losartan 100 mg oral tablet  -- 1 tab(s) by mouth once a day I will SWITCH the dose or number of times a day I take the medications listed below when I get home from the hospital:    losartan 100 mg oral tablet  -- 1 tab(s) by mouth once a day    metoprolol succinate 50 mg oral tablet, extended release  -- 1 tab(s) by mouth once a day

## 2018-01-28 NOTE — DISCHARGE NOTE ADULT - ADDITIONAL INSTRUCTIONS
Please follow-up with your neurologist once discharged from rehab.    Please keep patient elevated while eating to help prevent aspiration. Please follow-up with your neurologist within 1 week of discharge.   Please sit up and maintain that position while eating to prevent aspiration.

## 2018-01-28 NOTE — DISCHARGE NOTE ADULT - MEDICATION SUMMARY - MEDICATIONS TO STOP TAKING
I will STOP taking the medications listed below when I get home from the hospital:    enoxaparin  -- 40 milligram(s) subcutaneous once a day (at bedtime)    insulin isophane (NPH) 100 units/mL human recombinant subcutaneous suspension  -- 12 unit(s) subcutaneous once a day (before a meal)    insulin isophane (NPH) 100 units/mL human recombinant subcutaneous suspension  -- 4 unit(s) subcutaneous once a day (at bedtime)    insulin lispro 100 units/mL subcutaneous solution  --  subcutaneous once a day (at bedtime); 0 Unit(s) if Glucose 0 - 250  1 Unit(s) if Glucose 251 - 300  2 Unit(s) if Glucose 301 - 350  3 Unit(s) if Glucose 351 - 400  4 Unit(s) if Glucose Greater Than 400 I will STOP taking the medications listed below when I get home from the hospital:    traMADol 50 mg oral tablet  -- 0.5 tab(s) by mouth every 4 hours, As needed, Moderate Pain (4 - 6)    enoxaparin  -- 40 milligram(s) subcutaneous once a day (at bedtime)    insulin isophane (NPH) 100 units/mL human recombinant subcutaneous suspension  -- 12 unit(s) subcutaneous once a day (before a meal)    insulin isophane (NPH) 100 units/mL human recombinant subcutaneous suspension  -- 4 unit(s) subcutaneous once a day (at bedtime)    insulin lispro 100 units/mL subcutaneous solution  --  subcutaneous once a day (at bedtime); 0 Unit(s) if Glucose 0 - 250  1 Unit(s) if Glucose 251 - 300  2 Unit(s) if Glucose 301 - 350  3 Unit(s) if Glucose 351 - 400  4 Unit(s) if Glucose Greater Than 400    furosemide 20 mg oral tablet  -- 1 tab(s) by mouth every 12 hours    glucose 50% intravenous solution  -- 25 milliliter(s) intravenous once    glucose 50% intravenous solution  -- 50 milliliter(s) intravenous once    glucose 50% intravenous solution  -- 50 milliliter(s) intravenous once

## 2018-01-28 NOTE — DISCHARGE NOTE ADULT - OTHER SIGNIFICANT FINDINGS
EXAM:  CT BRAIN                            PROCEDURE DATE:  01/25/2018            INTERPRETATION:    CLINICAL INFORMATION: Altered mental status    TECHNIQUE: Noncontrast axial CT images were acquired through the head.   Two-dimensional sagittal and coronal reformats were obtained    COMPARISON: CT head 1/9/2018      FINDINGS:   Severely motion limited examination.    Redemonstration of left holohemispheric subdural hematoma measuring up to   1.2 cm in greatest depth and with increased fluid density compared to   1/19/2018. A tiny hyperdense components is notable within the left   occipital convexity. There is persistent rightward midline shift   measuring up to 5 mm at the septum pellucidum, which is unchanged.    There is no new acute intracranial hemorrhage, extra-axial fluid   collection, or hydrocephalus. The basal cisterns are patent.    Ventricles and sulci are normal in size for the patient's age.    Paranasal sinuses and mastoid air cells are clear. Patient is status post   left parietal craniotomy.    IMPRESSION: Motion limited examination.    Redemonstration of an evolving left holohemispheric subdural hematoma   with unchanged rightward midline shift of up to 5 mm.    No new acute intracranial hemorrhage, extra-axial fluid collection, or   hydrocephalus.

## 2018-01-28 NOTE — DISCHARGE NOTE ADULT - MEDICATION SUMMARY - MEDICATIONS TO TAKE
I will START or STAY ON the medications listed below when I get home from the hospital:    traMADol 50 mg oral tablet  -- 0.5 tab(s) by mouth every 4 hours, As needed, Moderate Pain (4 - 6)  -- Indication: For Pain    acetaminophen 325 mg oral tablet  -- 2 tab(s) by mouth every 6 hours, As needed, For Temp greater than 38 C (100.4 F)  -- Indication: For Fever, as needed    acetaminophen 325 mg oral tablet  -- 2 tab(s) by mouth every 6 hours, As needed, Mild Pain (1 - 3)  -- Indication: For Pain, as needed    losartan 50 mg oral tablet  -- 1 tab(s) by mouth 2 times a day  -- Indication: For Hypertension    lacosamide 150 mg oral tablet  -- 1 tab(s) by mouth 2 times a day  -- Indication: For Seizure Prophylaxis    insulin lispro 100 units/mL subcutaneous solution  --  subcutaneous 4 times a day (before meals and at bedtime); 2 Unit(s) if Glucose 151 - 200  4 Unit(s) if Glucose 201 - 250  6 Unit(s) if Glucose 251 - 300  8 Unit(s) if Glucose 301 - 350  10 Unit(s) if Glucose 351 - 400  12 Unit(s) if Glucose Greater Than 400  -- Indication: For Type 2 diabetes mellitus with hyperglycemia, with long-term current use of insulin    insulin lispro 100 units/mL subcutaneous solution  --  subcutaneous once a day (at bedtime); 0 Unit(s) if Glucose 0 - 250  1 Unit(s) if Glucose 251 - 300  2 Unit(s) if Glucose 301 - 350  3 Unit(s) if Glucose 351 - 400  4 Unit(s) if Glucose Greater Than 400  -- Indication: For Type 2 diabetes mellitus with hyperglycemia, with long-term current use of insulin    insulin glargine  -- 12 unit(s) subcutaneous once a day (at bedtime)  -- Indication: For Type 2 diabetes mellitus with hyperglycemia, with long-term current use of insulin    Crestor 10 mg oral tablet  -- 1 tab(s) by mouth once a day (at bedtime)  -- Indication: For Coronary Artery Disease    metoprolol succinate 50 mg oral tablet, extended release  -- 1 tab(s) by mouth once a day  -- Indication: For Atrial Fibrillation    amLODIPine 10 mg oral tablet  -- 1 tab(s) by mouth once a day  -- Indication: For Hypertension    furosemide 20 mg oral tablet  -- 1 tab(s) by mouth every 12 hours  -- Indication: For Hypertension    guaiFENesin 100 mg/5 mL oral liquid  -- 5 milliliter(s) by mouth every 6 hours, As needed, Cough  -- Indication: For Cough    docusate sodium 100 mg oral capsule  -- 1 cap(s) by mouth once a day  -- Indication: For Constipation    polyethylene glycol 3350 oral powder for reconstitution  -- 17 gram(s) by mouth every 12 hours  -- Indication: For Constipation    senna oral tablet  -- 2 tab(s) by mouth once a day (at bedtime)  -- Indication: For Constipation I will START or STAY ON the medications listed below when I get home from the hospital:    acetaminophen 325 mg oral tablet  -- 2 tab(s) by mouth every 6 hours, As needed, Mild Pain (1 - 3)  -- Indication: For Pain, as needed    losartan 50 mg oral tablet  -- 1 tab(s) by mouth 2 times a day  -- Indication: For Hypertension    lacosamide 150 mg oral tablet  -- 1 tab(s) by mouth 2 times a day  -- Indication: For Seizure Prophylaxis    insulin lispro 100 units/mL subcutaneous solution  --  subcutaneous 4 times a day (before meals and at bedtime); 2 Unit(s) if Glucose 151 - 200  4 Unit(s) if Glucose 201 - 250  6 Unit(s) if Glucose 251 - 300  8 Unit(s) if Glucose 301 - 350  10 Unit(s) if Glucose 351 - 400  12 Unit(s) if Glucose Greater Than 400  -- Indication: For Type 2 diabetes mellitus with hyperglycemia, with long-term current use of insulin    insulin lispro 100 units/mL subcutaneous solution  --  subcutaneous once a day (at bedtime); 0 Unit(s) if Glucose 0 - 250  1 Unit(s) if Glucose 251 - 300  2 Unit(s) if Glucose 301 - 350  3 Unit(s) if Glucose 351 - 400  4 Unit(s) if Glucose Greater Than 400  -- Indication: For Type 2 diabetes mellitus with hyperglycemia, with long-term current use of insulin    insulin glargine  -- 12 unit(s) subcutaneous once a day (at bedtime)  -- Indication: For Type 2 diabetes mellitus with hyperglycemia, with long-term current use of insulin    Crestor 10 mg oral tablet  -- 1 tab(s) by mouth once a day (at bedtime)  -- Indication: For Coronary Artery Disease    Metoprolol Succinate ER 25 mg oral tablet, extended release  -- 1 tab(s) by mouth once a day  -- Indication: For Hypertension    amLODIPine 10 mg oral tablet  -- 1 tab(s) by mouth once a day  -- Indication: For Hypertension    guaiFENesin 100 mg/5 mL oral liquid  -- 5 milliliter(s) by mouth every 6 hours, As needed, Cough  -- Indication: For Cough    docusate sodium 100 mg oral capsule  -- 1 cap(s) by mouth once a day  -- Indication: For Constipation    polyethylene glycol 3350 oral powder for reconstitution  -- 17 gram(s) by mouth every 12 hours  -- Indication: For Constipation    senna oral tablet  -- 2 tab(s) by mouth once a day (at bedtime)  -- Indication: For Constipation

## 2018-01-28 NOTE — PROGRESS NOTE ADULT - PROBLEM SELECTOR PLAN 3
- Currently in aflutter, rate controlled  - C/w metoprolol 12.5 mg bid for rate control  - hold AC for now in setting of SDH

## 2018-01-28 NOTE — PROGRESS NOTE ADULT - SUBJECTIVE AND OBJECTIVE BOX
Patient is a 89y old  Female who presents with a chief complaint of 89F p/w AMS x 1 day (25 Jan 2018 22:09)      SUBJECTIVE / OVERNIGHT EVENTS: Patient has mildly productive cough (occasional white sputum). No CP or palpitations, and minimal SOB. No N/V.    Gen: + fatigue. Negative for fevers, chills, malaise  Eyes: no blurred vision  ENT: no vertigo  Resp: +dyspnea (minimal) no wheezing, pleuritic chest pain, hemoptysis, or orthopnea  CV: no chest pain or palpitations  GI: no nausea, vomiting, abdominal pain, diarrhea  : no dysuria, hematuria  MSK: no arthralgias or myalgias  Neuro: no focal deficits, confusion, or seizures  Skin: no rash, lesions, or edema    MEDICATIONS  (STANDING):  ALBUTerol/ipratropium for Nebulization 3 milliLiter(s) Nebulizer every 6 hours  amLODIPine   Tablet 10 milliGRAM(s) Oral daily  dextrose 50% Injectable 12.5 Gram(s) IV Push once  dextrose 50% Injectable 25 Gram(s) IV Push once  dextrose 50% Injectable 25 Gram(s) IV Push once  docusate sodium 100 milliGRAM(s) Oral daily  insulin glargine Injectable (LANTUS) 12 Unit(s) SubCutaneous at bedtime  insulin lispro (HumaLOG) corrective regimen sliding scale   SubCutaneous three times a day before meals  insulin lispro Injectable (HumaLOG) 5 Unit(s) SubCutaneous three times a day before meals  lacosamide 150 milliGRAM(s) Oral two times a day  metoprolol     tartrate 12.5 milliGRAM(s) Oral two times a day  oseltamivir 30 milliGRAM(s) Oral every 12 hours  piperacillin/tazobactam IVPB. 3.375 Gram(s) IV Intermittent every 8 hours  polyethylene glycol 3350 17 Gram(s) Oral every 12 hours  senna 2 Tablet(s) Oral at bedtime    MEDICATIONS  (PRN):  acetaminophen   Tablet 650 milliGRAM(s) Oral every 6 hours PRN For Temp greater than 38 C (100.4 F)  acetaminophen   Tablet. 650 milliGRAM(s) Oral every 6 hours PRN Mild Pain (1 - 3)  dextrose Gel 1 Dose(s) Oral once PRN Blood Glucose LESS THAN 70 milliGRAM(s)/deciliter  glucagon  Injectable 1 milliGRAM(s) IntraMuscular once PRN Glucose LESS THAN 70 milligrams/deciliter    Vital Signs Last 24 Hrs  T(C): 36.9 (28 Jan 2018 06:44), Max: 37.1 (27 Jan 2018 20:59)  T(F): 98.5 (28 Jan 2018 06:44), Max: 98.7 (27 Jan 2018 20:59)  HR: 74 (28 Jan 2018 06:55) (60 - 86)  BP: 140/58 (28 Jan 2018 06:44) (122/59 - 140/58)  BP(mean): --  RR: 18 (28 Jan 2018 06:44) (17 - 18)  SpO2: 100% (28 Jan 2018 06:44) (99% - 100%)    CAPILLARY BLOOD GLUCOSE      POCT Blood Glucose.: 317 mg/dL (28 Jan 2018 07:28)  POCT Blood Glucose.: 332 mg/dL (28 Jan 2018 01:59)  POCT Blood Glucose.: 219 mg/dL (27 Jan 2018 21:45)  POCT Blood Glucose.: 354 mg/dL (27 Jan 2018 17:40)  POCT Blood Glucose.: 313 mg/dL (27 Jan 2018 13:59)  POCT Blood Glucose.: 418 mg/dL (27 Jan 2018 11:25)  POCT Blood Glucose.: 408 mg/dL (27 Jan 2018 11:23)    I&O's Summary    27 Jan 2018 07:01  -  28 Jan 2018 07:00  --------------------------------------------------------  IN: 920 mL / OUT: 250 mL / NET: 670 mL        PHYSICAL EXAM:  GENERAL: NAD, fatigued  HEAD:  Atraumatic  EYES: EOMI, PERRL, conjunctiva and sclera clear  NECK: Supple, No JVD  CHEST/LUNG: RLL rales. No wheezes  HEART: irregular rate and rhythm; III/VI RSB systolic murmur. No rubs or gallops  ABDOMEN: Soft, Nontender, Nondistended; Bowel sounds present  EXTREMITIES:  2+ Peripheral Pulses, No clubbing, cyanosis, or edema  NEUROLOGY: A&O x2-3  SKIN: No rashes or lesions    LABS:                        11.8   2.86  )-----------( 135      ( 27 Jan 2018 09:02 )             36.5                     RADIOLOGY & ADDITIONAL TESTS:    Imaging Personally Reviewed: none    Consultant(s) Notes Reviewed:  none    Care Discussed with Consultants/Other Providers: none
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
## 2018-01-28 NOTE — PROGRESS NOTE ADULT - PROBLEM SELECTOR PLAN 7
- DVT PPX: SCDs in setting of SDH  - Diet: pt tolerating dysphagia 1 diet  - Dispo: likely d/c to rehab this week pending PT eval

## 2018-01-28 NOTE — PROGRESS NOTE ADULT - PROBLEM SELECTOR PLAN 1
- resolved, pt afebrile, no tachypnea  - presentation of metabolic encephalopathy most likely 2/2 sepsis from influenza on superimposed infection given RLL consolidation on imaging  - MRSA PCR negative, legionella negative, Vanc/Azithro d/cd  - bcx NGTD  - c/w Zosyn, Day 4/5  - C/w Tamiflu, Day 4/5 - resolved, pt afebrile, no tachypnea  - presentation of metabolic encephalopathy most likely 2/2 sepsis from influenza on superimposed infection given RLL consolidation on imaging  - MRSA PCR negative, legionella negative, Vanc/Azithro d/cd  - bcx NGTD  - c/w Zosyn, Day 4/7  - C/w Tamiflu, Day 4/5

## 2018-01-28 NOTE — DISCHARGE NOTE ADULT - CARE PROVIDER_API CALL
Walter Gomez), Neurological Surgery  [address] [place] [address]  Phone: [phone]  Fax: [phone]    Nissenbaum, Michael A (MD), Neurology  [address]  [place] [address]  Phone: [phone]  Fax: [phone]

## 2018-01-28 NOTE — DISCHARGE NOTE ADULT - PATIENT PORTAL LINK FT
“You can access the Follow[employer] Patient Portal, offered by [facility], by registering with the following website: [email or web address]”

## 2018-01-28 NOTE — PROGRESS NOTE ADULT - PROBLEM SELECTOR PLAN 5
- A1C 6.2, therefore unlikely diabetes  - on NPH 12u TID and NPH 4u at bedtime at rehab  - here pt was getting prednisone 40 mg, d/cd 1/27. BGs still elevated 300s. Elevation here most likely 2/2 to steroid use. d/c steroids for now as no wheezing on exam  - Would increase insulin coverage to Lantus 15 u QHS, Humalog 6u premeal, and c/w moderate sliding scale coverage. Monitor closely for hypoglycemia as steroids have now been d/cd - A1C 6.2  - on Humalog 6u TID premeal and Lantus 15u at bedtime with moderate sliding scale coverage  - here pt was getting prednisone 40 mg, d/cd 1/27. BGs still elevated 300s. Elevation here most likely 2/2 to steroid use. d/c steroids for now as no wheezing on exam  - Monitor closely for hypoglycemia as steroids have now been d/cd

## 2018-01-29 LAB
GLUCOSE BLDC GLUCOMTR-MCNC: 106 MG/DL — HIGH (ref 70–99)
GLUCOSE BLDC GLUCOMTR-MCNC: 138 MG/DL — HIGH (ref 70–99)
GLUCOSE BLDC GLUCOMTR-MCNC: 242 MG/DL — HIGH (ref 70–99)
GLUCOSE BLDC GLUCOMTR-MCNC: 271 MG/DL — HIGH (ref 70–99)

## 2018-01-29 PROCEDURE — 99233 SBSQ HOSP IP/OBS HIGH 50: CPT | Mod: GC

## 2018-01-29 RX ORDER — LOSARTAN POTASSIUM 100 MG/1
100 TABLET, FILM COATED ORAL DAILY
Qty: 0 | Refills: 0 | Status: DISCONTINUED | OUTPATIENT
Start: 2018-01-29 | End: 2018-01-31

## 2018-01-29 RX ORDER — CALCIUM CARBONATE 500(1250)
1 TABLET ORAL ONCE
Qty: 0 | Refills: 0 | Status: COMPLETED | OUTPATIENT
Start: 2018-01-29 | End: 2018-01-29

## 2018-01-29 RX ADMIN — Medication 3 MILLILITER(S): at 11:39

## 2018-01-29 RX ADMIN — Medication 12.5 MILLIGRAM(S): at 05:28

## 2018-01-29 RX ADMIN — POLYETHYLENE GLYCOL 3350 17 GRAM(S): 17 POWDER, FOR SOLUTION ORAL at 17:25

## 2018-01-29 RX ADMIN — Medication 6: at 17:25

## 2018-01-29 RX ADMIN — SENNA PLUS 2 TABLET(S): 8.6 TABLET ORAL at 21:43

## 2018-01-29 RX ADMIN — Medication 30 MILLILITER(S): at 11:39

## 2018-01-29 RX ADMIN — Medication 5 UNIT(S): at 17:25

## 2018-01-29 RX ADMIN — LACOSAMIDE 150 MILLIGRAM(S): 50 TABLET ORAL at 17:24

## 2018-01-29 RX ADMIN — Medication 3 MILLILITER(S): at 05:28

## 2018-01-29 RX ADMIN — PIPERACILLIN AND TAZOBACTAM 25 GRAM(S): 4; .5 INJECTION, POWDER, LYOPHILIZED, FOR SOLUTION INTRAVENOUS at 00:06

## 2018-01-29 RX ADMIN — Medication 3 MILLILITER(S): at 00:07

## 2018-01-29 RX ADMIN — PIPERACILLIN AND TAZOBACTAM 25 GRAM(S): 4; .5 INJECTION, POWDER, LYOPHILIZED, FOR SOLUTION INTRAVENOUS at 05:29

## 2018-01-29 RX ADMIN — Medication 30 MILLIGRAM(S): at 05:28

## 2018-01-29 RX ADMIN — Medication 100 MILLIGRAM(S): at 11:39

## 2018-01-29 RX ADMIN — Medication 3 MILLILITER(S): at 17:25

## 2018-01-29 RX ADMIN — INSULIN GLARGINE 12 UNIT(S): 100 INJECTION, SOLUTION SUBCUTANEOUS at 21:43

## 2018-01-29 RX ADMIN — LOSARTAN POTASSIUM 100 MILLIGRAM(S): 100 TABLET, FILM COATED ORAL at 11:39

## 2018-01-29 RX ADMIN — Medication 5 UNIT(S): at 07:58

## 2018-01-29 RX ADMIN — LACOSAMIDE 150 MILLIGRAM(S): 50 TABLET ORAL at 05:48

## 2018-01-29 RX ADMIN — Medication 1 TABLET(S): at 05:47

## 2018-01-29 RX ADMIN — AMLODIPINE BESYLATE 10 MILLIGRAM(S): 2.5 TABLET ORAL at 05:28

## 2018-01-29 RX ADMIN — Medication 4: at 07:58

## 2018-01-29 RX ADMIN — PIPERACILLIN AND TAZOBACTAM 25 GRAM(S): 4; .5 INJECTION, POWDER, LYOPHILIZED, FOR SOLUTION INTRAVENOUS at 14:45

## 2018-01-29 RX ADMIN — Medication 30 MILLIGRAM(S): at 17:24

## 2018-01-29 RX ADMIN — INSULIN GLARGINE 12 UNIT(S): 100 INJECTION, SOLUTION SUBCUTANEOUS at 00:06

## 2018-01-29 NOTE — PROGRESS NOTE ADULT - PROBLEM SELECTOR PLAN 5
- A1C 6.2  - on Humalog 6u TID premeal and Lantus 15u at bedtime with moderate sliding scale coverage  - here pt was getting prednisone 40 mg, d/cd 1/27. BGs still elevated 300s. Elevation here most likely 2/2 to steroid use. d/c steroids for now as no wheezing on exam  - Monitor closely for hypoglycemia as steroids have now been d/cd - A1C 6.2  - on Humalog 6u TID premeal and Lantus 15u at bedtime with moderate sliding scale coverage  - Significant hyperglycemia but likely related to steroids which were stopped on 1/27; will continue to monitor for hypoglycemia and titrate insulin as needed

## 2018-01-29 NOTE — PHYSICAL THERAPY INITIAL EVALUATION ADULT - ADDITIONAL COMMENTS
Pt lives with her daughter in a house and her son lives in the basement with 3 steps to enter and 1 flight of stairs, +HR. Pt was independent with all ADLs and ambulation PTA. Pt uses a RW for ambulation.

## 2018-01-29 NOTE — PROGRESS NOTE ADULT - PROBLEM SELECTOR PLAN 1
- resolved  - Likely due to superimposed PNA on top of influenza considering R lobe consolidation  - MRSA PCR negative, legionella negative, Vanc/Azithro d/cd  - bcx NGTD  - c/w Zosyn, Day 6/7  - Finished Tamiflu course - resolved  - Likely due to superimposed PNA on top of influenza considering R lobe consolidation  - MRSA PCR negative, legionella negative, Vanc/Azithro d/cd  - bcx NGTD  - c/w Zosyn, Day 6/7  - C/W Tamiflu, received 7/9 doses - resolved  - Likely due to superimposed PNA on top of influenza considering R lobe consolidation  - MRSA PCR negative, legionella negative, Vanc/Azithro d/cd  - bcx NGTD  - c/w Zosyn, Day 5/7  - C/W Tamiflu, received 7/9 doses

## 2018-01-29 NOTE — PROGRESS NOTE ADULT - PROBLEM SELECTOR PLAN 2
- Finished course of tamiflu  - respiratory droplet precaution  - pt has productive cough but improving, remains afebrile - On tamiflu, received 7/9 doses  - respiratory droplet precaution  - pt has productive cough but improving, remains afebrile

## 2018-01-29 NOTE — PROGRESS NOTE ADULT - SUBJECTIVE AND OBJECTIVE BOX
Patient is a 89y old  Female who presents with a chief complaint of 89F p/w AMS x 1 day (28 Jan 2018 23:40)      SUBJECTIVE / OVERNIGHT EVENTS:  Complaining of gas pain this morning, unrelieved by Maalox.  Otherwise, no complaints from overnight.      MEDICATIONS  (STANDING):  ALBUTerol/ipratropium for Nebulization 3 milliLiter(s) Nebulizer every 6 hours  amLODIPine   Tablet 10 milliGRAM(s) Oral daily  dextrose 50% Injectable 12.5 Gram(s) IV Push once  docusate sodium 100 milliGRAM(s) Oral daily  insulin glargine Injectable (LANTUS) 12 Unit(s) SubCutaneous at bedtime  insulin lispro (HumaLOG) corrective regimen sliding scale   SubCutaneous three times a day before meals  insulin lispro Injectable (HumaLOG) 5 Unit(s) SubCutaneous three times a day before meals  lacosamide 150 milliGRAM(s) Oral two times a day  metoprolol     tartrate 12.5 milliGRAM(s) Oral two times a day  oseltamivir 30 milliGRAM(s) Oral every 12 hours  piperacillin/tazobactam IVPB. 3.375 Gram(s) IV Intermittent every 8 hours  polyethylene glycol 3350 17 Gram(s) Oral every 12 hours  senna 2 Tablet(s) Oral at bedtime    MEDICATIONS  (PRN):  acetaminophen   Tablet 650 milliGRAM(s) Oral every 6 hours PRN For Temp greater than 38 C (100.4 F)  acetaminophen   Tablet. 650 milliGRAM(s) Oral every 6 hours PRN Mild Pain (1 - 3)  glucagon  Injectable 1 milliGRAM(s) IntraMuscular once PRN Glucose LESS THAN 70 milligrams/deciliter      Vital Signs Last 24 Hrs  T(C): 37.1 (29 Jan 2018 08:38), Max: 37.1 (28 Jan 2018 21:00)  T(F): 98.7 (29 Jan 2018 08:38), Max: 98.8 (29 Jan 2018 04:32)  HR: 57 (29 Jan 2018 08:38) (57 - 79)  BP: 145/72 (29 Jan 2018 08:38) (110/61 - 178/76)  BP(mean): --  RR: 16 (29 Jan 2018 08:39) (16 - 18)  SpO2: 98% (29 Jan 2018 08:39) (89% - 100%)      CAPILLARY BLOOD GLUCOSE  POCT Blood Glucose.: 242 mg/dL (29 Jan 2018 07:25)  POCT Blood Glucose.: 140 mg/dL (28 Jan 2018 21:54)  POCT Blood Glucose.: 149 mg/dL (28 Jan 2018 17:24)  POCT Blood Glucose.: 243 mg/dL (28 Jan 2018 11:31)        I&O's Summary    28 Jan 2018 07:01  -  29 Jan 2018 07:00  --------------------------------------------------------  IN: 240 mL / OUT: 0 mL / NET: 240 mL      PHYSICAL EXAM:  GENERAL:  NAD, laying in bed comfortably  EYES:  EOMI, PERRLA, no icterus noted  ENMT: Dentition generally intact, MMM  NECK:  ROM intact  CHEST/LUNG:  Clear to auscultation bilaterally  HEART:  Systolic ejection murmur noted, ocassional skipped beats but otherwise RRR  ABDOMEN: +BS, NT/D, no pain to palpation   EXTREMITIES:  Non-palpable pulses LE bilateral  PSYCH:  A&Ox3  NEUROLOGY:  No focal deficits noted  SKIN: Intact to gross observation      LABS:                        11.8   2.86  )-----------( 135      ( 27 Jan 2018 09:02 )             36.5       RADIOLOGY & ADDITIONAL TESTS:  Imaging Personally Reviewed: YES    Consultant(s) Notes Reviewed: YES    Care Discussed with Consultants/Other Providers: YES      Shaka Trivedi MD PGY-1  Department of Medicine  [phone]

## 2018-01-29 NOTE — PHYSICAL THERAPY INITIAL EVALUATION ADULT - PERTINENT HX OF CURRENT PROBLEM, REHAB EVAL
89F PMHx of recent SDH s/p left craniotomy/evacuation (1/4/18), HTN, T2DM, Afib, prior Endocarditis (2010, treated), Cervical Ca s/p Hysterectomy (2000, c/b adhesions & bowel obstruction and PNA w/ difficulty weaning off ventilator requiring trach, reversed 2 wks later), recent admission 1/3 - 1/13 for craniotomy, now presenting from rehab with AMS x1 day, found w/metabolic encephalopathy & hypoxic respiratory failure in setting of sepsis, +AH3 w/presumed bactermial PNA, c/b hyperglycemia.

## 2018-01-29 NOTE — PROGRESS NOTE ADULT - PROBLEM SELECTOR PLAN 6
- BPs controlled, c/w amlodipine 10 mg - BPs elevated  - c/w amlodipine 10 mg  - Increase metoprolol to 25 mg BID

## 2018-01-29 NOTE — PROGRESS NOTE ADULT - PROBLEM SELECTOR PLAN 3
- Sinus rhythm this AM  - C/w metoprolol 12.5 mg bid for rate control  - hold AC for now in setting of SDH - Sinus rhythm this AM  - C/w metoprolol; will increase to 25 mg BID for rate control and hypertension  - hold AC for now in setting of SDH

## 2018-01-30 LAB
CULTURE RESULTS: SIGNIFICANT CHANGE UP
CULTURE RESULTS: SIGNIFICANT CHANGE UP
GLUCOSE BLDC GLUCOMTR-MCNC: 186 MG/DL — HIGH (ref 70–99)
GLUCOSE BLDC GLUCOMTR-MCNC: 187 MG/DL — HIGH (ref 70–99)
GLUCOSE BLDC GLUCOMTR-MCNC: 209 MG/DL — HIGH (ref 70–99)
GLUCOSE BLDC GLUCOMTR-MCNC: 78 MG/DL — SIGNIFICANT CHANGE UP (ref 70–99)
SPECIMEN SOURCE: SIGNIFICANT CHANGE UP
SPECIMEN SOURCE: SIGNIFICANT CHANGE UP

## 2018-01-30 PROCEDURE — 99233 SBSQ HOSP IP/OBS HIGH 50: CPT | Mod: GC

## 2018-01-30 RX ORDER — HYDRALAZINE HCL 50 MG
10 TABLET ORAL ONCE
Qty: 0 | Refills: 0 | Status: COMPLETED | OUTPATIENT
Start: 2018-01-30 | End: 2018-01-30

## 2018-01-30 RX ADMIN — Medication 10 MILLIGRAM(S): at 20:38

## 2018-01-30 RX ADMIN — Medication 10 MILLIGRAM(S): at 20:53

## 2018-01-30 RX ADMIN — Medication 2: at 11:30

## 2018-01-30 RX ADMIN — Medication 5 UNIT(S): at 07:56

## 2018-01-30 RX ADMIN — INSULIN GLARGINE 12 UNIT(S): 100 INJECTION, SOLUTION SUBCUTANEOUS at 23:02

## 2018-01-30 RX ADMIN — Medication 12.5 MILLIGRAM(S): at 06:18

## 2018-01-30 RX ADMIN — LOSARTAN POTASSIUM 100 MILLIGRAM(S): 100 TABLET, FILM COATED ORAL at 06:18

## 2018-01-30 RX ADMIN — LACOSAMIDE 150 MILLIGRAM(S): 50 TABLET ORAL at 06:18

## 2018-01-30 RX ADMIN — Medication 2: at 07:56

## 2018-01-30 RX ADMIN — SENNA PLUS 2 TABLET(S): 8.6 TABLET ORAL at 23:02

## 2018-01-30 RX ADMIN — Medication 30 MILLIGRAM(S): at 06:18

## 2018-01-30 RX ADMIN — Medication 3 MILLILITER(S): at 06:19

## 2018-01-30 RX ADMIN — AMLODIPINE BESYLATE 10 MILLIGRAM(S): 2.5 TABLET ORAL at 06:18

## 2018-01-30 RX ADMIN — Medication 3 MILLILITER(S): at 23:02

## 2018-01-30 RX ADMIN — Medication 3 MILLILITER(S): at 17:40

## 2018-01-30 RX ADMIN — Medication 5 UNIT(S): at 11:30

## 2018-01-30 RX ADMIN — Medication 100 MILLIGRAM(S): at 11:30

## 2018-01-30 RX ADMIN — PIPERACILLIN AND TAZOBACTAM 25 GRAM(S): 4; .5 INJECTION, POWDER, LYOPHILIZED, FOR SOLUTION INTRAVENOUS at 06:18

## 2018-01-30 RX ADMIN — PIPERACILLIN AND TAZOBACTAM 25 GRAM(S): 4; .5 INJECTION, POWDER, LYOPHILIZED, FOR SOLUTION INTRAVENOUS at 00:55

## 2018-01-30 RX ADMIN — Medication 100 MILLIGRAM(S): at 10:15

## 2018-01-30 RX ADMIN — Medication 3 MILLILITER(S): at 11:30

## 2018-01-30 RX ADMIN — POLYETHYLENE GLYCOL 3350 17 GRAM(S): 17 POWDER, FOR SOLUTION ORAL at 06:20

## 2018-01-30 RX ADMIN — PIPERACILLIN AND TAZOBACTAM 25 GRAM(S): 4; .5 INJECTION, POWDER, LYOPHILIZED, FOR SOLUTION INTRAVENOUS at 23:01

## 2018-01-30 RX ADMIN — LACOSAMIDE 150 MILLIGRAM(S): 50 TABLET ORAL at 17:40

## 2018-01-30 RX ADMIN — PIPERACILLIN AND TAZOBACTAM 25 GRAM(S): 4; .5 INJECTION, POWDER, LYOPHILIZED, FOR SOLUTION INTRAVENOUS at 14:19

## 2018-01-30 NOTE — PROGRESS NOTE ADULT - PROBLEM SELECTOR PLAN 2
- Completed Tamiflu  - respiratory droplet precaution  - pt has productive cough but improving, remains afebrile

## 2018-01-30 NOTE — PROGRESS NOTE ADULT - PROBLEM SELECTOR PLAN 1
- resolved  - Likely due to superimposed PNA on top of influenza considering R lobe consolidation  - MRSA PCR negative, legionella negative, Vanc/Azithro d/cd  - c/w Zosyn, Day 6/7  - Completed Tamiflu  - bcx NGTD

## 2018-01-30 NOTE — PROGRESS NOTE ADULT - SUBJECTIVE AND OBJECTIVE BOX
Patient is a 89y old  Female who presents with a chief complaint of 89F p/w AMS x 1 day (28 Jan 2018 23:40)      SUBJECTIVE / OVERNIGHT EVENTS:  No events overnight.  Patient resting comfortably this AM without any complaints.      MEDICATIONS  (STANDING):  ALBUTerol/ipratropium for Nebulization 3 milliLiter(s) Nebulizer every 6 hours  amLODIPine   Tablet 10 milliGRAM(s) Oral daily  dextrose 50% Injectable 12.5 Gram(s) IV Push once  docusate sodium 100 milliGRAM(s) Oral daily  insulin glargine Injectable (LANTUS) 12 Unit(s) SubCutaneous at bedtime  insulin lispro (HumaLOG) corrective regimen sliding scale   SubCutaneous three times a day before meals  insulin lispro Injectable (HumaLOG) 5 Unit(s) SubCutaneous three times a day before meals  lacosamide 150 milliGRAM(s) Oral two times a day  losartan 100 milliGRAM(s) Oral daily  metoprolol     tartrate 12.5 milliGRAM(s) Oral two times a day  piperacillin/tazobactam IVPB. 3.375 Gram(s) IV Intermittent every 8 hours  polyethylene glycol 3350 17 Gram(s) Oral every 12 hours  senna 2 Tablet(s) Oral at bedtime    MEDICATIONS  (PRN):  acetaminophen   Tablet 650 milliGRAM(s) Oral every 6 hours PRN For Temp greater than 38 C (100.4 F)  acetaminophen   Tablet. 650 milliGRAM(s) Oral every 6 hours PRN Mild Pain (1 - 3)  aluminum hydroxide/magnesium hydroxide/simethicone Suspension 30 milliLiter(s) Oral every 4 hours PRN Dyspepsia  glucagon  Injectable 1 milliGRAM(s) IntraMuscular once PRN Glucose LESS THAN 70 milligrams/deciliter      Vital Signs Last 24 Hrs  T(C): 36.7 (30 Jan 2018 05:16), Max: 36.8 (29 Jan 2018 12:49)  T(F): 98 (30 Jan 2018 05:16), Max: 98.3 (29 Jan 2018 12:49)  HR: 51 (30 Jan 2018 05:16) (47 - 67)  BP: 180/75 (30 Jan 2018 05:16) (144/56 - 183/74)  BP(mean): --  RR: 18 (30 Jan 2018 05:16) (17 - 20)  SpO2: 97% (30 Jan 2018 05:16) (97% - 99%)      CAPILLARY BLOOD GLUCOSE  POCT Blood Glucose.: 187 mg/dL (30 Jan 2018 07:13)  POCT Blood Glucose.: 138 mg/dL (29 Jan 2018 21:12)  POCT Blood Glucose.: 271 mg/dL (29 Jan 2018 17:04)  POCT Blood Glucose.: 106 mg/dL (29 Jan 2018 11:21)      I&O's Summary    29 Jan 2018 07:01  -  30 Jan 2018 07:00  --------------------------------------------------------  IN: 1030 mL / OUT: 1180 mL / NET: -150 mL    30 Jan 2018 07:01  -  30 Jan 2018 09:26  --------------------------------------------------------  IN: 240 mL / OUT: 0 mL / NET: 240 mL      PHYSICAL EXAM:  GENERAL:  NAD, laying in bed comfortably  EYES:  EOMI, PERRLA, no icterus noted  ENMT: Dentition generally intact, MMM  NECK:  ROM intact  CHEST/LUNG:  Clear to auscultation bilaterally  HEART:  Systolic ejection murmur noted, ocassional skipped beats but otherwise RRR  ABDOMEN: +BS, NT/D, no pain to palpation   EXTREMITIES:  Non-palpable pulses LE bilateral  PSYCH:  A&Ox3  NEUROLOGY:  No focal deficits noted  SKIN: Intact to gross observation        RADIOLOGY & ADDITIONAL TESTS:  Imaging Personally Reviewed: YES    Consultant(s) Notes Reviewed: YES    Care Discussed with Consultants/Other Providers: YES      Shaka Trivedi MD PGY-1  Department of Medicine  [phone]

## 2018-01-30 NOTE — PROGRESS NOTE ADULT - PROBLEM SELECTOR PLAN 5
- A1C 6.2  - on Humalog 6u TID premeal and Lantus 15u at bedtime with moderate sliding scale coverage  - Significant hyperglycemia but likely related to steroids which were stopped on 1/27; will continue to monitor for hypoglycemia and titrate insulin as needed

## 2018-01-31 VITALS — DIASTOLIC BLOOD PRESSURE: 66 MMHG | HEART RATE: 54 BPM | SYSTOLIC BLOOD PRESSURE: 146 MMHG

## 2018-01-31 LAB
GLUCOSE BLDC GLUCOMTR-MCNC: 207 MG/DL — HIGH (ref 70–99)
GLUCOSE BLDC GLUCOMTR-MCNC: 249 MG/DL — HIGH (ref 70–99)

## 2018-01-31 PROCEDURE — 82947 ASSAY GLUCOSE BLOOD QUANT: CPT

## 2018-01-31 PROCEDURE — 81001 URINALYSIS AUTO W/SCOPE: CPT

## 2018-01-31 PROCEDURE — 83605 ASSAY OF LACTIC ACID: CPT

## 2018-01-31 PROCEDURE — 85014 HEMATOCRIT: CPT

## 2018-01-31 PROCEDURE — 96375 TX/PRO/DX INJ NEW DRUG ADDON: CPT

## 2018-01-31 PROCEDURE — 71045 X-RAY EXAM CHEST 1 VIEW: CPT

## 2018-01-31 PROCEDURE — 87486 CHLMYD PNEUM DNA AMP PROBE: CPT

## 2018-01-31 PROCEDURE — 82962 GLUCOSE BLOOD TEST: CPT

## 2018-01-31 PROCEDURE — 87640 STAPH A DNA AMP PROBE: CPT

## 2018-01-31 PROCEDURE — 97161 PT EVAL LOW COMPLEX 20 MIN: CPT

## 2018-01-31 PROCEDURE — 82803 BLOOD GASES ANY COMBINATION: CPT

## 2018-01-31 PROCEDURE — 83735 ASSAY OF MAGNESIUM: CPT

## 2018-01-31 PROCEDURE — 87798 DETECT AGENT NOS DNA AMP: CPT

## 2018-01-31 PROCEDURE — 82553 CREATINE MB FRACTION: CPT

## 2018-01-31 PROCEDURE — 96374 THER/PROPH/DIAG INJ IV PUSH: CPT

## 2018-01-31 PROCEDURE — 93005 ELECTROCARDIOGRAM TRACING: CPT

## 2018-01-31 PROCEDURE — 85027 COMPLETE CBC AUTOMATED: CPT

## 2018-01-31 PROCEDURE — 82550 ASSAY OF CK (CPK): CPT

## 2018-01-31 PROCEDURE — 80202 ASSAY OF VANCOMYCIN: CPT

## 2018-01-31 PROCEDURE — 84100 ASSAY OF PHOSPHORUS: CPT

## 2018-01-31 PROCEDURE — 99239 HOSP IP/OBS DSCHRG MGMT >30: CPT

## 2018-01-31 PROCEDURE — 87086 URINE CULTURE/COLONY COUNT: CPT

## 2018-01-31 PROCEDURE — 84295 ASSAY OF SERUM SODIUM: CPT

## 2018-01-31 PROCEDURE — 87070 CULTURE OTHR SPECIMN AEROBIC: CPT

## 2018-01-31 PROCEDURE — 70450 CT HEAD/BRAIN W/O DYE: CPT

## 2018-01-31 PROCEDURE — 84484 ASSAY OF TROPONIN QUANT: CPT

## 2018-01-31 PROCEDURE — 87040 BLOOD CULTURE FOR BACTERIA: CPT

## 2018-01-31 PROCEDURE — 87633 RESP VIRUS 12-25 TARGETS: CPT

## 2018-01-31 PROCEDURE — 87641 MR-STAPH DNA AMP PROBE: CPT

## 2018-01-31 PROCEDURE — 82330 ASSAY OF CALCIUM: CPT

## 2018-01-31 PROCEDURE — 94640 AIRWAY INHALATION TREATMENT: CPT

## 2018-01-31 PROCEDURE — 99285 EMERGENCY DEPT VISIT HI MDM: CPT | Mod: 25

## 2018-01-31 PROCEDURE — 87581 M.PNEUMON DNA AMP PROBE: CPT

## 2018-01-31 PROCEDURE — 80053 COMPREHEN METABOLIC PANEL: CPT

## 2018-01-31 PROCEDURE — 82435 ASSAY OF BLOOD CHLORIDE: CPT

## 2018-01-31 PROCEDURE — 87449 NOS EACH ORGANISM AG IA: CPT

## 2018-01-31 PROCEDURE — 84132 ASSAY OF SERUM POTASSIUM: CPT

## 2018-01-31 RX ORDER — ACETAMINOPHEN 500 MG
2 TABLET ORAL
Qty: 0 | Refills: 0 | COMMUNITY
Start: 2018-01-31

## 2018-01-31 RX ORDER — METOPROLOL TARTRATE 50 MG
1 TABLET ORAL
Qty: 30 | Refills: 0 | OUTPATIENT
Start: 2018-01-31 | End: 2018-03-01

## 2018-01-31 RX ORDER — METOPROLOL TARTRATE 50 MG
12.5 TABLET ORAL
Qty: 0 | Refills: 0 | Status: DISCONTINUED | OUTPATIENT
Start: 2018-01-31 | End: 2018-01-31

## 2018-01-31 RX ORDER — LOSARTAN POTASSIUM 100 MG/1
1 TABLET, FILM COATED ORAL
Qty: 0 | Refills: 0 | COMMUNITY
Start: 2018-01-31

## 2018-01-31 RX ORDER — LOSARTAN POTASSIUM 100 MG/1
50 TABLET, FILM COATED ORAL
Qty: 0 | Refills: 0 | Status: DISCONTINUED | OUTPATIENT
Start: 2018-02-01 | End: 2018-01-31

## 2018-01-31 RX ORDER — INSULIN GLARGINE 100 [IU]/ML
12 INJECTION, SOLUTION SUBCUTANEOUS
Qty: 0 | Refills: 0 | COMMUNITY
Start: 2018-01-31

## 2018-01-31 RX ADMIN — Medication 100 MILLIGRAM(S): at 13:20

## 2018-01-31 RX ADMIN — Medication 4: at 07:34

## 2018-01-31 RX ADMIN — Medication 3 MILLILITER(S): at 05:11

## 2018-01-31 RX ADMIN — PIPERACILLIN AND TAZOBACTAM 25 GRAM(S): 4; .5 INJECTION, POWDER, LYOPHILIZED, FOR SOLUTION INTRAVENOUS at 05:11

## 2018-01-31 RX ADMIN — LACOSAMIDE 150 MILLIGRAM(S): 50 TABLET ORAL at 05:09

## 2018-01-31 RX ADMIN — Medication 100 MILLIGRAM(S): at 05:10

## 2018-01-31 RX ADMIN — Medication 4: at 13:21

## 2018-01-31 RX ADMIN — AMLODIPINE BESYLATE 10 MILLIGRAM(S): 2.5 TABLET ORAL at 05:09

## 2018-01-31 RX ADMIN — LOSARTAN POTASSIUM 100 MILLIGRAM(S): 100 TABLET, FILM COATED ORAL at 05:10

## 2018-01-31 RX ADMIN — Medication 5 UNIT(S): at 13:21

## 2018-01-31 RX ADMIN — Medication 5 UNIT(S): at 07:34

## 2018-01-31 RX ADMIN — Medication 3 MILLILITER(S): at 13:21

## 2018-01-31 NOTE — PROVIDER CONTACT NOTE (OTHER) - SITUATION
HR down to 38 on tele
Notified provider, FS performed during night as per day RN's recommendation, day MD wanted to make sure FS did not drop into the 100's, FS during the night=[id number]
Notified provider, pt complaining of burning in upper abdomen/chest area
Notified provider, pt has no AM labs ordered
Notified provider, pt is due for metoprolol 12.5 mg PO, parameter is to hold for HR<65, HR has been in 50's-60's all night as per tele tech
Notified provider, pt's CA=007/75 manually, pt is complaining of a headache but doesn't know how much on a scale of 1-10, pt is A/Ox2 with confusion as her baseline this admission
PO Lopressor 12.5mg held d/t parameter
Pt's RD=850, ytovco=758
Pt converted to Aflutter.

## 2018-01-31 NOTE — PROVIDER CONTACT NOTE (OTHER) - RECOMMENDATIONS
will continue to monitor.
Clarify if med should be given now or held
Clarify if supplemental insulin should be given
Come to bedside to assess pt
Pt requesting TUMS
clarify with MD if AM labs necessary

## 2018-01-31 NOTE — PROVIDER CONTACT NOTE (OTHER) - ASSESSMENT
Pt Alert & responsive, vitals stable. pt is asymptomatic, No SOB or c/p. Dr. Collins at bedside.
Pt asymptomatic  VSS
HR 55 on tele, afib.
No signs of distress
No signs of distress, droplet precautions for flu
TM=335/64, HR=56
VSS
VSS: HR 49, /58, RR 18, 02 sat 98% 1L NC , 98.1 temp. pt denies chest pain, lightheadedness, feeling of faint
XN=246/75 manually, HR=61 on cardiac monitor

## 2018-01-31 NOTE — PROGRESS NOTE ADULT - PROBLEM SELECTOR PLAN 1
- resolved  - Likely due to superimposed PNA on top of influenza considering R lobe consolidation  - MRSA PCR negative, legionella negative, Vanc/Azithro d/cd  - c/w Zosyn, Day 6/7  - Completed Tamiflu  - bcx NGTD - resolved  - Likely due to superimposed PNA on top of influenza considering R lobe consolidation  - MRSA PCR negative, legionella negative, Vanc/Azithro d/cd  - complete Zosyn course today  - Completed Tamiflu  - bcx NGTD

## 2018-01-31 NOTE — PROGRESS NOTE ADULT - PROBLEM SELECTOR PLAN 4
- CT head on admission, re-demonstrated hematoma, stable R midline shift at 5mm  - per neurosurgery no surgical intervention warranted  - on Vimpat for seizure prophylaxis, continue to monitor
- CT head on admission, re-demonstrated hematoma, stable R midline shift at 5mm  - per neurosurgery no surgical intervention warranted  - on Vimpat for seizure prophylaxis, continue to monitor
- CT head on admission, redemonstrated hematoma, stable R midline shift at 5mm  - per neurosurgery no surgical intervention warranted  - on Vimpat for seizure prophylaxis, continue to monitor
- CT head on admission, redemonstrated hematoma, stable R midline shift at 5mm  - per neurosurgery no surgical intervention warranted  - on Vimpat for seizure prophylaxis, continue to monitor
- CT head on admission, redemonstrated hematoma, stable R midline shift at 5mm  - per neurosurgery no surgical intervention warranted  - on vimpat for seizure prophylaxis.   - cont to monitor
- CT head repeat today, redemonstrated hematoma, stable R midline shift at 5mm  - per neurosurgery no surgical intervention warranted  - on vimpat for seizure prophylaxis.   - cont to monitor

## 2018-01-31 NOTE — PROGRESS NOTE ADULT - PROBLEM SELECTOR PROBLEM 5
Hyperglycemia
Type 2 diabetes mellitus with hyperglycemia, with long-term current use of insulin
Type 2 diabetes mellitus with hyperglycemia, with long-term current use of insulin

## 2018-01-31 NOTE — PROVIDER CONTACT NOTE (OTHER) - ACTION/TREATMENT ORDERED:
Provider came to bedside to assess pt, hydralazine IVP 10 mg, hydralazine PO 10 mg, DJ=937/58 manually after medications were given-MD aware, continue to monitor pt

## 2018-01-31 NOTE — PROGRESS NOTE ADULT - SUBJECTIVE AND OBJECTIVE BOX
Patient is a 89y old  Female who presents with a chief complaint of 89F p/w AMS x 1 day (28 Jan 2018 23:40)        SUBJECTIVE / OVERNIGHT EVENTS:  Patient very hypertensive overnight to 210, asymptomatic.  Given 10 mg hydralazine push + 10 mg PO hydralazine with good resolution.  Otherwise asymptomatic this AM.      MEDICATIONS  (STANDING):  ALBUTerol/ipratropium for Nebulization 3 milliLiter(s) Nebulizer every 6 hours  amLODIPine   Tablet 10 milliGRAM(s) Oral daily  dextrose 50% Injectable 12.5 Gram(s) IV Push once  docusate sodium 100 milliGRAM(s) Oral daily  insulin glargine Injectable (LANTUS) 12 Unit(s) SubCutaneous at bedtime  insulin lispro (HumaLOG) corrective regimen sliding scale   SubCutaneous three times a day before meals  insulin lispro Injectable (HumaLOG) 5 Unit(s) SubCutaneous three times a day before meals  lacosamide 150 milliGRAM(s) Oral two times a day  losartan 50 milliGRAM(s) Oral two times a day  metoprolol     tartrate 12.5 milliGRAM(s) Oral two times a day  piperacillin/tazobactam IVPB. 3.375 Gram(s) IV Intermittent every 8 hours  polyethylene glycol 3350 17 Gram(s) Oral every 12 hours  senna 2 Tablet(s) Oral at bedtime    MEDICATIONS  (PRN):  acetaminophen   Tablet 650 milliGRAM(s) Oral every 6 hours PRN For Temp greater than 38 C (100.4 F)  acetaminophen   Tablet. 650 milliGRAM(s) Oral every 6 hours PRN Mild Pain (1 - 3)  aluminum hydroxide/magnesium hydroxide/simethicone Suspension 30 milliLiter(s) Oral every 4 hours PRN Dyspepsia  glucagon  Injectable 1 milliGRAM(s) IntraMuscular once PRN Glucose LESS THAN 70 milligrams/deciliter  guaiFENesin    Syrup 100 milliGRAM(s) Oral every 6 hours PRN Cough      Vital Signs Last 24 Hrs  T(C): 36.7 (31 Jan 2018 04:34), Max: 36.9 (30 Jan 2018 11:05)  T(F): 98.1 (31 Jan 2018 04:34), Max: 98.4 (30 Jan 2018 11:05)  HR: 56 (31 Jan 2018 04:34) (47 - 61)  BP: 158/64 (31 Jan 2018 04:34) (144/64 - 210/75)  BP(mean): --  RR: 18 (31 Jan 2018 04:34) (16 - 18)  SpO2: 96% (31 Jan 2018 04:34) (96% - 100%)      CAPILLARY BLOOD GLUCOSE  POCT Blood Glucose.: 249 mg/dL (31 Jan 2018 07:29)  POCT Blood Glucose.: 209 mg/dL (30 Jan 2018 21:38)  POCT Blood Glucose.: 78 mg/dL (30 Jan 2018 17:33)  POCT Blood Glucose.: 186 mg/dL (30 Jan 2018 11:23)      I&O's Summary    30 Jan 2018 07:01  -  31 Jan 2018 07:00  --------------------------------------------------------  IN: 1100 mL / OUT: 350 mL / NET: 750 mL      PHYSICAL EXAM:  GENERAL:  NAD, laying in bed comfortably  EYES:  EOMI, PERRLA, no icterus noted  ENMT: Dentition generally intact, MMM  NECK:  ROM intact  CHEST/LUNG:  Clear to auscultation bilaterally  HEART:  Systolic ejection murmur noted, ocassional skipped beats but otherwise RRR  ABDOMEN: +BS, NT/D, no pain to palpation   EXTREMITIES:  Non-palpable pulses LE bilateral  PSYCH:  A&Ox3  NEUROLOGY:  No focal deficits noted  SKIN: Intact to gross observation      RADIOLOGY & ADDITIONAL TESTS:  Imaging Personally Reviewed: YES    Consultant(s) Notes Reviewed: YES    Care Discussed with Consultants/Other Providers: YES      Shaka Trivedi MD PGY-1  Department of Medicine  [phone]

## 2018-01-31 NOTE — PROGRESS NOTE ADULT - ASSESSMENT
89F PMHx of recent SDH s/p left craniotomy/evacuation (1/4/18), HTN, T2DM, Afib (on ASA 81), prior Endocarditis (2010, treated), Cervical Ca s/p Hysterectomy (2000, c/b adhesions and bowel obstruction and PNA with difficulty weaning off ventilator requiring trach, reversed 2 wks later), recent admission 1/3 - 1/13 for craniotomy, now presenting from rehab with AMS, found w/ metabolic encephalopathy and hypoxic respiratory failure in setting of sepsis (T 101.2, RR 27) w/ (+) AH3 presumed superimposed bacterial pna with CTH showing no progression of SDH.

## 2018-01-31 NOTE — PROVIDER CONTACT NOTE (OTHER) - BACKGROUND
Pt admitted for PNA, positive for flu.
88yo F adm for sepsis likely 2/2 PNA, flu. afib on Metoprolol 12.5mg for rate control. on IV Zosyn for PNA, PO Tamiflu for flu
Pt admitted for pneumonia
Pt admitted for pneumonia, A fib on tele
Pt was admitted for pneumonia, A fib on tele
pt adm for sepsis likely 2/2 asp pna, flu, AMS.

## 2018-01-31 NOTE — PROGRESS NOTE ADULT - PROBLEM SELECTOR PROBLEM 4
SDH (subdural hematoma)

## 2018-01-31 NOTE — PROGRESS NOTE ADULT - PROBLEM SELECTOR PROBLEM 1
Sepsis, due to unspecified organism

## 2018-01-31 NOTE — PROVIDER CONTACT NOTE (OTHER) - DATE AND TIME:
27-Jan-2018 08:30
27-Jan-2018 21:00
28-Jan-2018 02:18
28-Jan-2018 03:30
29-Jan-2018 10:35
29-Jan-2018 17:41
30-Jan-2018 20:25
31-Jan-2018 05:18
26-Jan-2018 19:34

## 2018-01-31 NOTE — PROGRESS NOTE ADULT - PROBLEM SELECTOR PLAN 8
- pt is full code right now    Shaka Trivedi MD PGY-1  Department of Medicine  [phone]
- pt is full code right now    Aryles Hedjar, PGY-2  Department of Internal Medicine  Pager [facility] [phone]
- pt is full code right now    Phil Nakia  [phone]
- pt is full code right now    Phil Nakia  [phone]
- pt is full code right now    Shaka Trivedi MD PGY-1  Department of Medicine  [phone]
- pt is full code right now    Shaka Trivedi MD PGY-1  Department of Medicine  [phone]

## 2018-01-31 NOTE — PROVIDER CONTACT NOTE (OTHER) - REASON
HR down to 38 on tele
Hyperglycemia
PO Lopressor 12.5mg held d/t parameter
Pt complaining of burning in upper abdomen/chest area
Pt has no  labs ordered
Pt is due for metoprolol 12.5 mg PO but HR is in the 50's
Pt's TQ=952/75 manually
TK=861
event on tele

## 2018-01-31 NOTE — PROGRESS NOTE ADULT - PROBLEM SELECTOR PROBLEM 3
Paroxysmal atrial fibrillation

## 2018-01-31 NOTE — PROGRESS NOTE ADULT - PROBLEM SELECTOR PLAN 7
- DVT PPX: SCDs in setting of SDH  - Diet: pt tolerating dysphagia 1 diet  - Dispo: D/C to rehab - DVT PPX: SCDs in setting of SDH  - Diet: pt tolerating dysphagia 1 diet  - Dispo: D/C to rehab today. 45 minutes spent discharging the patient.

## 2018-02-01 ENCOUNTER — EMERGENCY (EMERGENCY)
Facility: HOSPITAL | Age: 83
LOS: 1 days | Discharge: HOME | End: 2018-02-01
Attending: EMERGENCY MEDICINE | Admitting: HOSPITALIST
Payer: MEDICARE

## 2018-02-01 VITALS
DIASTOLIC BLOOD PRESSURE: 67 MMHG | TEMPERATURE: 98 F | HEART RATE: 50 BPM | RESPIRATION RATE: 18 BRPM | SYSTOLIC BLOOD PRESSURE: 176 MMHG | OXYGEN SATURATION: 100 %

## 2018-02-01 DIAGNOSIS — I10 ESSENTIAL (PRIMARY) HYPERTENSION: ICD-10-CM

## 2018-02-01 DIAGNOSIS — Z79.4 LONG TERM (CURRENT) USE OF INSULIN: ICD-10-CM

## 2018-02-01 DIAGNOSIS — I95.9 HYPOTENSION, UNSPECIFIED: ICD-10-CM

## 2018-02-01 DIAGNOSIS — J96.01 ACUTE RESPIRATORY FAILURE WITH HYPOXIA: ICD-10-CM

## 2018-02-01 DIAGNOSIS — Z98.890 OTHER SPECIFIED POSTPROCEDURAL STATES: Chronic | ICD-10-CM

## 2018-02-01 DIAGNOSIS — Z98.890 OTHER SPECIFIED POSTPROCEDURAL STATES: ICD-10-CM

## 2018-02-01 DIAGNOSIS — E11.9 TYPE 2 DIABETES MELLITUS WITHOUT COMPLICATIONS: ICD-10-CM

## 2018-02-01 DIAGNOSIS — Z79.899 OTHER LONG TERM (CURRENT) DRUG THERAPY: ICD-10-CM

## 2018-02-01 DIAGNOSIS — Z90.710 ACQUIRED ABSENCE OF BOTH CERVIX AND UTERUS: Chronic | ICD-10-CM

## 2018-02-01 LAB
ALBUMIN SERPL ELPH-MCNC: 3.2 G/DL — LOW (ref 3.3–5)
ALP SERPL-CCNC: 79 U/L — SIGNIFICANT CHANGE UP (ref 40–120)
ALT FLD-CCNC: 13 U/L RC — SIGNIFICANT CHANGE UP (ref 10–45)
ANION GAP SERPL CALC-SCNC: 9 MMOL/L — SIGNIFICANT CHANGE UP (ref 5–17)
APTT BLD: 29.2 SEC — SIGNIFICANT CHANGE UP (ref 27.5–37.4)
AST SERPL-CCNC: 27 U/L — SIGNIFICANT CHANGE UP (ref 10–40)
BASE EXCESS BLDV CALC-SCNC: 7.1 MMOL/L — HIGH (ref -2–2)
BASOPHILS # BLD AUTO: 0 K/UL — SIGNIFICANT CHANGE UP (ref 0–0.2)
BASOPHILS NFR BLD AUTO: 0.1 % — SIGNIFICANT CHANGE UP (ref 0–2)
BILIRUB SERPL-MCNC: 0.6 MG/DL — SIGNIFICANT CHANGE UP (ref 0.2–1.2)
BUN SERPL-MCNC: 12 MG/DL — SIGNIFICANT CHANGE UP (ref 7–23)
CA-I SERPL-SCNC: 1.26 MMOL/L — SIGNIFICANT CHANGE UP (ref 1.12–1.3)
CALCIUM SERPL-MCNC: 10 MG/DL — SIGNIFICANT CHANGE UP (ref 8.4–10.5)
CHLORIDE BLDV-SCNC: 102 MMOL/L — SIGNIFICANT CHANGE UP (ref 96–108)
CHLORIDE SERPL-SCNC: 99 MMOL/L — SIGNIFICANT CHANGE UP (ref 96–108)
CK MB CFR SERPL CALC: 2 NG/ML — SIGNIFICANT CHANGE UP (ref 0–3.8)
CK SERPL-CCNC: 47 U/L — SIGNIFICANT CHANGE UP (ref 25–170)
CO2 BLDV-SCNC: 35 MMOL/L — HIGH (ref 22–30)
CO2 SERPL-SCNC: 31 MMOL/L — SIGNIFICANT CHANGE UP (ref 22–31)
CREAT SERPL-MCNC: 0.82 MG/DL — SIGNIFICANT CHANGE UP (ref 0.5–1.3)
EOSINOPHIL # BLD AUTO: 0 K/UL — SIGNIFICANT CHANGE UP (ref 0–0.5)
EOSINOPHIL NFR BLD AUTO: 0.4 % — SIGNIFICANT CHANGE UP (ref 0–6)
FLUAV H1 2009 PAND RNA SPEC QL NAA+PROBE: DETECTED
GAS PNL BLDV: 135 MMOL/L — LOW (ref 136–145)
GAS PNL BLDV: SIGNIFICANT CHANGE UP
GLUCOSE BLDV-MCNC: 316 MG/DL — HIGH (ref 70–99)
GLUCOSE SERPL-MCNC: 317 MG/DL — HIGH (ref 70–99)
HCO3 BLDV-SCNC: 33 MMOL/L — HIGH (ref 21–29)
HCT VFR BLD CALC: 35.5 % — SIGNIFICANT CHANGE UP (ref 34.5–45)
HCT VFR BLDA CALC: 46 % — SIGNIFICANT CHANGE UP (ref 39–50)
HGB BLD CALC-MCNC: 15.2 G/DL — SIGNIFICANT CHANGE UP (ref 11.5–15.5)
HGB BLD-MCNC: 12.1 G/DL — SIGNIFICANT CHANGE UP (ref 11.5–15.5)
INR BLD: 1.05 RATIO — SIGNIFICANT CHANGE UP (ref 0.88–1.16)
LACTATE BLDV-MCNC: 1.8 MMOL/L — SIGNIFICANT CHANGE UP (ref 0.7–2)
LYMPHOCYTES # BLD AUTO: 1.2 K/UL — SIGNIFICANT CHANGE UP (ref 1–3.3)
LYMPHOCYTES # BLD AUTO: 21.2 % — SIGNIFICANT CHANGE UP (ref 13–44)
MAGNESIUM SERPL-MCNC: 2.1 MG/DL — SIGNIFICANT CHANGE UP (ref 1.6–2.6)
MCHC RBC-ENTMCNC: 31.7 PG — SIGNIFICANT CHANGE UP (ref 27–34)
MCHC RBC-ENTMCNC: 34.1 GM/DL — SIGNIFICANT CHANGE UP (ref 32–36)
MCV RBC AUTO: 93.2 FL — SIGNIFICANT CHANGE UP (ref 80–100)
MONOCYTES # BLD AUTO: 0.5 K/UL — SIGNIFICANT CHANGE UP (ref 0–0.9)
MONOCYTES NFR BLD AUTO: 8.8 % — SIGNIFICANT CHANGE UP (ref 2–14)
NEUTROPHILS # BLD AUTO: 3.8 K/UL — SIGNIFICANT CHANGE UP (ref 1.8–7.4)
NEUTROPHILS NFR BLD AUTO: 69.5 % — SIGNIFICANT CHANGE UP (ref 43–77)
NT-PROBNP SERPL-SCNC: 4062 PG/ML — HIGH (ref 0–300)
PCO2 BLDV: 55 MMHG — HIGH (ref 35–50)
PH BLDV: 7.4 — SIGNIFICANT CHANGE UP (ref 7.35–7.45)
PHOSPHATE SERPL-MCNC: 2.5 MG/DL — SIGNIFICANT CHANGE UP (ref 2.5–4.5)
PLATELET # BLD AUTO: 113 K/UL — LOW (ref 150–400)
PO2 BLDV: 47 MMHG — HIGH (ref 25–45)
POTASSIUM BLDV-SCNC: 5.8 MMOL/L — HIGH (ref 3.5–5)
POTASSIUM SERPL-MCNC: 4.6 MMOL/L — SIGNIFICANT CHANGE UP (ref 3.5–5.3)
POTASSIUM SERPL-SCNC: 4.6 MMOL/L — SIGNIFICANT CHANGE UP (ref 3.5–5.3)
PROT SERPL-MCNC: 7.3 G/DL — SIGNIFICANT CHANGE UP (ref 6–8.3)
PROTHROM AB SERPL-ACNC: 11.4 SEC — SIGNIFICANT CHANGE UP (ref 9.8–12.7)
RAPID RVP RESULT: DETECTED
RBC # BLD: 3.81 M/UL — SIGNIFICANT CHANGE UP (ref 3.8–5.2)
RBC # FLD: 14.5 % — SIGNIFICANT CHANGE UP (ref 10.3–14.5)
SAO2 % BLDV: 83 % — SIGNIFICANT CHANGE UP (ref 67–88)
SODIUM SERPL-SCNC: 139 MMOL/L — SIGNIFICANT CHANGE UP (ref 135–145)
TROPONIN T SERPL-MCNC: 0.01 NG/ML — SIGNIFICANT CHANGE UP (ref 0–0.06)
WBC # BLD: 5.5 K/UL — SIGNIFICANT CHANGE UP (ref 3.8–10.5)
WBC # FLD AUTO: 5.5 K/UL — SIGNIFICANT CHANGE UP (ref 3.8–10.5)

## 2018-02-01 RX ORDER — SODIUM CHLORIDE 9 MG/ML
3 INJECTION INTRAMUSCULAR; INTRAVENOUS; SUBCUTANEOUS ONCE
Qty: 0 | Refills: 0 | Status: COMPLETED | OUTPATIENT
Start: 2018-02-01 | End: 2018-02-01

## 2018-02-01 RX ORDER — FUROSEMIDE 40 MG
40 TABLET ORAL ONCE
Qty: 0 | Refills: 0 | Status: COMPLETED | OUTPATIENT
Start: 2018-02-01 | End: 2018-02-01

## 2018-02-01 RX ADMIN — SODIUM CHLORIDE 3 MILLILITER(S): 9 INJECTION INTRAMUSCULAR; INTRAVENOUS; SUBCUTANEOUS at 20:11

## 2018-02-01 RX ADMIN — Medication 40 MILLIGRAM(S): at 20:00

## 2018-02-01 NOTE — ED ADULT NURSE NOTE - CHPI ED SYMPTOMS NEG
no diaphoresis/no chills/no vomiting/no cough/no dizziness/no back pain/no chest pain/no shortness of breath/no syncope/no nausea/no fever

## 2018-02-01 NOTE — ED PROVIDER NOTE - ATTENDING CONTRIBUTION TO CARE
Attending MD Villalobos:  I personally have seen and examined this patient.  Resident note reviewed and agree on plan of care and except where noted.  See HPI, PE, and MDM for details.

## 2018-02-01 NOTE — ED PROVIDER NOTE - MEDICAL DECISION MAKING DETAILS
Attending MD Villalobos: 89F with h/o afib on metoprolol, SDH, recent discharge 1 day prior for flu A presenting from nursing facility with report of bradycardia to 40s and hypotension to 80s. EKG on arrival slow afib/flutter in 40s, patient mentating and with SBPs in 150s so no need for urgent/emergent pacing. Ddx broad for bradycardia but includes hyperK, beta blocker effect, worsening SDH. Plan for labs, maintain on tele, CXR. Patient incidentally also hypoxic to low 80s in RA, ddx includes CHF, secondary PNA. Less likely PE give bradycardia but will consider if no other etiology of hypoxic resp failure elucidated

## 2018-02-01 NOTE — ED ADULT NURSE REASSESSMENT NOTE - NS ED NURSE REASSESS COMMENT FT1
Patient returned from radiology, a&o x2, in no acute distress. Vital signs remain stable at this time. Family is at bedside.

## 2018-02-01 NOTE — ED ADULT NURSE NOTE - OBJECTIVE STATEMENT
89 y/o female, brought in by EMS from nursing home for bradycardia and hypotension. EMS reports patient HR in 40's and BP "40 over palp." EMS states they placed the patient in shock position and BP increased to 180's systolic. Patient has a history of A-Fib. Discharged from [facility] yesterday for hospitalization for pneumonia. Patient is breathing even, full, and unlabored, with diminished lung sounds bilat. Intermittent cough reported. Loud murmur heard upon auscultation. Patient has no complaints of headache, weakness, dizziness, nausea, vomiting, SOB, or chest pain. Abdomen soft, nontender. Resting on stretcher with daughter at bedside. Cardiac and spo2 monitor in place. Advised of plan of care. 88 y/o female, a&o x2 (person and place---baseline as per daughter), brought in by EMS from nursing home for bradycardia and hypotension. EMS reports patient HR in 40's and BP "40 over palp." EMS states they placed the patient in shock position and BP increased to 180's systolic. Patient has a history of A-Fib. Discharged from [facility] yesterday for hospitalization for pneumonia. Patient is breathing even, full, and unlabored, with diminished lung sounds bilat. Intermittent cough reported. Loud murmur heard upon auscultation. Patient has no complaints of headache, weakness, dizziness, nausea, vomiting, SOB, or chest pain. Abdomen soft, nontender. Resting on stretcher with daughter at bedside. Cardiac and spo2 monitor in place. Advised of plan of care.
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
## 2018-02-02 VITALS
OXYGEN SATURATION: 97 % | DIASTOLIC BLOOD PRESSURE: 60 MMHG | TEMPERATURE: 98 F | HEART RATE: 48 BPM | RESPIRATION RATE: 18 BRPM | SYSTOLIC BLOOD PRESSURE: 137 MMHG

## 2018-02-02 DIAGNOSIS — I48.1 PERSISTENT ATRIAL FIBRILLATION: ICD-10-CM

## 2018-02-02 DIAGNOSIS — Z71.89 OTHER SPECIFIED COUNSELING: ICD-10-CM

## 2018-02-02 DIAGNOSIS — R00.1 BRADYCARDIA, UNSPECIFIED: ICD-10-CM

## 2018-02-02 DIAGNOSIS — I50.9 HEART FAILURE, UNSPECIFIED: ICD-10-CM

## 2018-02-02 DIAGNOSIS — I62.00 NONTRAUMATIC SUBDURAL HEMORRHAGE, UNSPECIFIED: ICD-10-CM

## 2018-02-02 DIAGNOSIS — R13.10 DYSPHAGIA, UNSPECIFIED: ICD-10-CM

## 2018-02-02 DIAGNOSIS — E11.65 TYPE 2 DIABETES MELLITUS WITH HYPERGLYCEMIA: ICD-10-CM

## 2018-02-02 DIAGNOSIS — Z29.9 ENCOUNTER FOR PROPHYLACTIC MEASURES, UNSPECIFIED: ICD-10-CM

## 2018-02-02 DIAGNOSIS — J10.1 INFLUENZA DUE TO OTHER IDENTIFIED INFLUENZA VIRUS WITH OTHER RESPIRATORY MANIFESTATIONS: ICD-10-CM

## 2018-02-02 DIAGNOSIS — J96.01 ACUTE RESPIRATORY FAILURE WITH HYPOXIA: ICD-10-CM

## 2018-02-02 LAB
ALBUMIN SERPL ELPH-MCNC: 3.2 G/DL — LOW (ref 3.3–5)
ALP SERPL-CCNC: 76 U/L — SIGNIFICANT CHANGE UP (ref 40–120)
ALT FLD-CCNC: 12 U/L RC — SIGNIFICANT CHANGE UP (ref 10–45)
ANION GAP SERPL CALC-SCNC: 11 MMOL/L — SIGNIFICANT CHANGE UP (ref 5–17)
AST SERPL-CCNC: 19 U/L — SIGNIFICANT CHANGE UP (ref 10–40)
BASOPHILS # BLD AUTO: 0 K/UL — SIGNIFICANT CHANGE UP (ref 0–0.2)
BASOPHILS NFR BLD AUTO: 0 % — SIGNIFICANT CHANGE UP (ref 0–2)
BILIRUB SERPL-MCNC: 0.7 MG/DL — SIGNIFICANT CHANGE UP (ref 0.2–1.2)
BUN SERPL-MCNC: 13 MG/DL — SIGNIFICANT CHANGE UP (ref 7–23)
CALCIUM SERPL-MCNC: 9.9 MG/DL — SIGNIFICANT CHANGE UP (ref 8.4–10.5)
CHLORIDE SERPL-SCNC: 98 MMOL/L — SIGNIFICANT CHANGE UP (ref 96–108)
CK MB BLD-MCNC: 3.8 % — HIGH (ref 0–3.5)
CK MB CFR SERPL CALC: 1.1 NG/ML — SIGNIFICANT CHANGE UP (ref 0–3.8)
CK SERPL-CCNC: 29 U/L — SIGNIFICANT CHANGE UP (ref 25–170)
CO2 SERPL-SCNC: 31 MMOL/L — SIGNIFICANT CHANGE UP (ref 22–31)
CREAT SERPL-MCNC: 0.76 MG/DL — SIGNIFICANT CHANGE UP (ref 0.5–1.3)
EOSINOPHIL # BLD AUTO: 0.1 K/UL — SIGNIFICANT CHANGE UP (ref 0–0.5)
EOSINOPHIL NFR BLD AUTO: 1.2 % — SIGNIFICANT CHANGE UP (ref 0–6)
GLUCOSE BLDC GLUCOMTR-MCNC: 301 MG/DL — HIGH (ref 70–99)
GLUCOSE SERPL-MCNC: 261 MG/DL — HIGH (ref 70–99)
HCT VFR BLD CALC: 33.5 % — LOW (ref 34.5–45)
HGB BLD-MCNC: 11.4 G/DL — LOW (ref 11.5–15.5)
LYMPHOCYTES # BLD AUTO: 1 K/UL — SIGNIFICANT CHANGE UP (ref 1–3.3)
LYMPHOCYTES # BLD AUTO: 20 % — SIGNIFICANT CHANGE UP (ref 13–44)
MCHC RBC-ENTMCNC: 31.6 PG — SIGNIFICANT CHANGE UP (ref 27–34)
MCHC RBC-ENTMCNC: 34.1 GM/DL — SIGNIFICANT CHANGE UP (ref 32–36)
MCV RBC AUTO: 92.8 FL — SIGNIFICANT CHANGE UP (ref 80–100)
MONOCYTES # BLD AUTO: 0.5 K/UL — SIGNIFICANT CHANGE UP (ref 0–0.9)
MONOCYTES NFR BLD AUTO: 9.2 % — SIGNIFICANT CHANGE UP (ref 2–14)
NEUTROPHILS # BLD AUTO: 3.5 K/UL — SIGNIFICANT CHANGE UP (ref 1.8–7.4)
NEUTROPHILS NFR BLD AUTO: 69.5 % — SIGNIFICANT CHANGE UP (ref 43–77)
PHOSPHATE SERPL-MCNC: 1.9 MG/DL — LOW (ref 2.5–4.5)
PLATELET # BLD AUTO: 99 K/UL — LOW (ref 150–400)
POTASSIUM SERPL-MCNC: 3.7 MMOL/L — SIGNIFICANT CHANGE UP (ref 3.5–5.3)
POTASSIUM SERPL-SCNC: 3.7 MMOL/L — SIGNIFICANT CHANGE UP (ref 3.5–5.3)
PROT SERPL-MCNC: 6.9 G/DL — SIGNIFICANT CHANGE UP (ref 6–8.3)
RBC # BLD: 3.61 M/UL — LOW (ref 3.8–5.2)
RBC # FLD: 14.4 % — SIGNIFICANT CHANGE UP (ref 10.3–14.5)
SODIUM SERPL-SCNC: 140 MMOL/L — SIGNIFICANT CHANGE UP (ref 135–145)
TROPONIN T SERPL-MCNC: 0.01 NG/ML — SIGNIFICANT CHANGE UP (ref 0–0.06)
WBC # BLD: 5.1 K/UL — SIGNIFICANT CHANGE UP (ref 3.8–10.5)
WBC # FLD AUTO: 5.1 K/UL — SIGNIFICANT CHANGE UP (ref 3.8–10.5)

## 2018-02-02 PROCEDURE — 87798 DETECT AGENT NOS DNA AMP: CPT

## 2018-02-02 PROCEDURE — 71045 X-RAY EXAM CHEST 1 VIEW: CPT

## 2018-02-02 PROCEDURE — 82962 GLUCOSE BLOOD TEST: CPT

## 2018-02-02 PROCEDURE — 83605 ASSAY OF LACTIC ACID: CPT

## 2018-02-02 PROCEDURE — 80053 COMPREHEN METABOLIC PANEL: CPT

## 2018-02-02 PROCEDURE — 82550 ASSAY OF CK (CPK): CPT

## 2018-02-02 PROCEDURE — 85730 THROMBOPLASTIN TIME PARTIAL: CPT

## 2018-02-02 PROCEDURE — 87633 RESP VIRUS 12-25 TARGETS: CPT

## 2018-02-02 PROCEDURE — 84132 ASSAY OF SERUM POTASSIUM: CPT

## 2018-02-02 PROCEDURE — 84295 ASSAY OF SERUM SODIUM: CPT

## 2018-02-02 PROCEDURE — 82803 BLOOD GASES ANY COMBINATION: CPT

## 2018-02-02 PROCEDURE — 85027 COMPLETE CBC AUTOMATED: CPT

## 2018-02-02 PROCEDURE — 99285 EMERGENCY DEPT VISIT HI MDM: CPT | Mod: 25

## 2018-02-02 PROCEDURE — 83735 ASSAY OF MAGNESIUM: CPT

## 2018-02-02 PROCEDURE — 87581 M.PNEUMON DNA AMP PROBE: CPT

## 2018-02-02 PROCEDURE — 82330 ASSAY OF CALCIUM: CPT

## 2018-02-02 PROCEDURE — 93005 ELECTROCARDIOGRAM TRACING: CPT

## 2018-02-02 PROCEDURE — 85610 PROTHROMBIN TIME: CPT

## 2018-02-02 PROCEDURE — 84484 ASSAY OF TROPONIN QUANT: CPT

## 2018-02-02 PROCEDURE — 85014 HEMATOCRIT: CPT

## 2018-02-02 PROCEDURE — 70450 CT HEAD/BRAIN W/O DYE: CPT

## 2018-02-02 PROCEDURE — 96374 THER/PROPH/DIAG INJ IV PUSH: CPT | Mod: XU

## 2018-02-02 PROCEDURE — 82947 ASSAY GLUCOSE BLOOD QUANT: CPT

## 2018-02-02 PROCEDURE — 71275 CT ANGIOGRAPHY CHEST: CPT

## 2018-02-02 PROCEDURE — 84100 ASSAY OF PHOSPHORUS: CPT

## 2018-02-02 PROCEDURE — 82435 ASSAY OF BLOOD CHLORIDE: CPT

## 2018-02-02 PROCEDURE — 82553 CREATINE MB FRACTION: CPT

## 2018-02-02 PROCEDURE — 87486 CHLMYD PNEUM DNA AMP PROBE: CPT

## 2018-02-02 PROCEDURE — 83880 ASSAY OF NATRIURETIC PEPTIDE: CPT

## 2018-02-02 RX ORDER — INSULIN GLARGINE 100 [IU]/ML
12 INJECTION, SOLUTION SUBCUTANEOUS AT BEDTIME
Qty: 0 | Refills: 0 | Status: DISCONTINUED | OUTPATIENT
Start: 2018-02-02 | End: 2018-02-02

## 2018-02-02 RX ORDER — LACOSAMIDE 50 MG/1
150 TABLET ORAL EVERY 12 HOURS
Qty: 0 | Refills: 0 | Status: DISCONTINUED | OUTPATIENT
Start: 2018-02-02 | End: 2018-02-02

## 2018-02-02 RX ORDER — ATORVASTATIN CALCIUM 80 MG/1
80 TABLET, FILM COATED ORAL AT BEDTIME
Qty: 0 | Refills: 0 | Status: DISCONTINUED | OUTPATIENT
Start: 2018-02-02 | End: 2018-02-02

## 2018-02-02 RX ORDER — DOCUSATE SODIUM 100 MG
100 CAPSULE ORAL DAILY
Qty: 0 | Refills: 0 | Status: DISCONTINUED | OUTPATIENT
Start: 2018-02-02 | End: 2018-02-02

## 2018-02-02 RX ORDER — SODIUM CHLORIDE 9 MG/ML
1000 INJECTION, SOLUTION INTRAVENOUS
Qty: 0 | Refills: 0 | Status: DISCONTINUED | OUTPATIENT
Start: 2018-02-02 | End: 2018-02-02

## 2018-02-02 RX ORDER — DEXTROSE 50 % IN WATER 50 %
1 SYRINGE (ML) INTRAVENOUS ONCE
Qty: 0 | Refills: 0 | Status: DISCONTINUED | OUTPATIENT
Start: 2018-02-02 | End: 2018-02-02

## 2018-02-02 RX ORDER — FUROSEMIDE 40 MG
1 TABLET ORAL
Qty: 0 | Refills: 0 | COMMUNITY
Start: 2018-02-02

## 2018-02-02 RX ORDER — SENNA PLUS 8.6 MG/1
2 TABLET ORAL AT BEDTIME
Qty: 0 | Refills: 0 | Status: DISCONTINUED | OUTPATIENT
Start: 2018-02-02 | End: 2018-02-02

## 2018-02-02 RX ORDER — LOSARTAN POTASSIUM 100 MG/1
50 TABLET, FILM COATED ORAL DAILY
Qty: 0 | Refills: 0 | Status: DISCONTINUED | OUTPATIENT
Start: 2018-02-02 | End: 2018-02-02

## 2018-02-02 RX ORDER — DEXTROSE 50 % IN WATER 50 %
25 SYRINGE (ML) INTRAVENOUS ONCE
Qty: 0 | Refills: 0 | Status: DISCONTINUED | OUTPATIENT
Start: 2018-02-02 | End: 2018-02-02

## 2018-02-02 RX ORDER — FUROSEMIDE 40 MG
20 TABLET ORAL DAILY
Qty: 0 | Refills: 0 | Status: DISCONTINUED | OUTPATIENT
Start: 2018-02-02 | End: 2018-02-02

## 2018-02-02 RX ORDER — AMLODIPINE BESYLATE 2.5 MG/1
10 TABLET ORAL DAILY
Qty: 0 | Refills: 0 | Status: DISCONTINUED | OUTPATIENT
Start: 2018-02-02 | End: 2018-02-02

## 2018-02-02 RX ORDER — POLYETHYLENE GLYCOL 3350 17 G/17G
17 POWDER, FOR SOLUTION ORAL EVERY 12 HOURS
Qty: 0 | Refills: 0 | Status: DISCONTINUED | OUTPATIENT
Start: 2018-02-02 | End: 2018-02-02

## 2018-02-02 RX ORDER — DEXTROSE 50 % IN WATER 50 %
12.5 SYRINGE (ML) INTRAVENOUS ONCE
Qty: 0 | Refills: 0 | Status: DISCONTINUED | OUTPATIENT
Start: 2018-02-02 | End: 2018-02-02

## 2018-02-02 RX ORDER — INSULIN LISPRO 100/ML
VIAL (ML) SUBCUTANEOUS
Qty: 0 | Refills: 0 | Status: DISCONTINUED | OUTPATIENT
Start: 2018-02-02 | End: 2018-02-02

## 2018-02-02 RX ORDER — ENOXAPARIN SODIUM 100 MG/ML
30 INJECTION SUBCUTANEOUS DAILY
Qty: 0 | Refills: 0 | Status: DISCONTINUED | OUTPATIENT
Start: 2018-02-02 | End: 2018-02-02

## 2018-02-02 RX ORDER — GLUCAGON INJECTION, SOLUTION 0.5 MG/.1ML
1 INJECTION, SOLUTION SUBCUTANEOUS ONCE
Qty: 0 | Refills: 0 | Status: DISCONTINUED | OUTPATIENT
Start: 2018-02-02 | End: 2018-02-02

## 2018-02-02 RX ORDER — INSULIN LISPRO 100/ML
VIAL (ML) SUBCUTANEOUS AT BEDTIME
Qty: 0 | Refills: 0 | Status: DISCONTINUED | OUTPATIENT
Start: 2018-02-02 | End: 2018-02-02

## 2018-02-02 RX ADMIN — ENOXAPARIN SODIUM 30 MILLIGRAM(S): 100 INJECTION SUBCUTANEOUS at 13:04

## 2018-02-02 RX ADMIN — Medication 20 MILLIGRAM(S): at 15:55

## 2018-02-02 RX ADMIN — LACOSAMIDE 150 MILLIGRAM(S): 50 TABLET ORAL at 02:35

## 2018-02-02 RX ADMIN — POLYETHYLENE GLYCOL 3350 17 GRAM(S): 17 POWDER, FOR SOLUTION ORAL at 13:05

## 2018-02-02 RX ADMIN — AMLODIPINE BESYLATE 10 MILLIGRAM(S): 2.5 TABLET ORAL at 05:44

## 2018-02-02 RX ADMIN — Medication 4: at 13:04

## 2018-02-02 RX ADMIN — INSULIN GLARGINE 12 UNIT(S): 100 INJECTION, SOLUTION SUBCUTANEOUS at 02:34

## 2018-02-02 RX ADMIN — Medication 100 MILLIGRAM(S): at 13:05

## 2018-02-02 RX ADMIN — LOSARTAN POTASSIUM 50 MILLIGRAM(S): 100 TABLET, FILM COATED ORAL at 05:44

## 2018-02-02 NOTE — H&P ADULT - NSHPLABSRESULTS_GEN_ALL_CORE
12.1   5.5   )-----------( 113      ( 01 Feb 2018 19:44 )             35.5       02-01    139  |  99  |  12  ----------------------------<  317<H>  4.6   |  31  |  0.82    Ca    10.0      01 Feb 2018 19:44  Phos  2.5     02-01  Mg     2.1     02-01    TPro  7.3  /  Alb  3.2<L>  /  TBili  0.6  /  DBili  x   /  AST  27  /  ALT  13  /  AlkPhos  79  02-01      PT/INR - ( 01 Feb 2018 19:44 )   PT: 11.4 sec;   INR: 1.05 ratio         PTT - ( 01 Feb 2018 19:44 )  PTT:29.2 sec  CARDIAC MARKERS ( 01 Feb 2018 19:44 )  x     / 0.01 ng/mL / 47 U/L / x     / 2.0 ng/mL    19:44 - VBG - pH: 7.40  | pCO2: 55    | pO2: 47    | Lactate: 1.8      EKG: slow afib, no pauses, no pathologic ST segment elevations or depressions appreciated   CXR: RLE consolidation, BL pl effs.   CTPA: no evid of PE

## 2018-02-02 NOTE — H&P ADULT - NSHPSOCIALHISTORY_GEN_ALL_CORE
Currently at rehab  EtOH history denies  Tobacco History dnies   Employment prev worked as   Born in [facility]  [place]ame to [place] 1984 Currently at rehab  EtOH history denies  Tobacco History denies  Employment prev worked as   Born in [facility]  Came to  1984

## 2018-02-02 NOTE — H&P ADULT - HISTORY OF PRESENT ILLNESS
89F PMHx of recent SDH s/p left craniotomy/evacuation (1/4/18), HTN, T2DM, Afib (on ASA 81), prior Endocarditis (2010, treated), Cervical Ca s/p Hysterectomy (2000, c/b adhesions and bowel obstruction and PNA with difficulty weaning off ventilator requiring trach) now presenting w/  hypotension and bradycardia to 40s while at rehab facility after arriving from [facility] one day prior. Upon arrival EMS positioned [place] in [place] with improvement in BP to ~ 190/80s and brought in for further eval. Of note, pt was admitted 1/25-1/31 from rehab for AMS and found Influ AH3 (+) w/ superimposed bacterial pna tx'd w/ 6 days zosyn and 5 days tamiflu. She had bradycardia on presentation, eval'ed by EP and determined to be slow afib w/ PVCs and cont'd on rate control.    ED course  - T 98, HR 48-50, 150-170s/50-50-70s, RR 24, 99% on 2L  - Lab sig for no leukocytosis, hyperglycemia to 300, AG at 9, proBNp at 4000,   - 19:44 - VBG - pH: 7.40  | pCO2: 55    | pO2: 47    | Lactate: 1.8    - RVP (+), CXR w/ RLL consolidation c/w pna  - CTPA w/o evid of PE, BL pl effs 89F PMHx of recent SDH s/p left craniotomy/evacuation (1/4/18), HTN, T2DM, Afib (on ASA 81), prior Endocarditis (2010, treated), Cervical Ca s/p Hysterectomy (2000, c/b adhesions and bowel obstruction and PNA with difficulty weaning off ventilator requiring trach) now presenting w/  hypotension and bradycardia to 40s while at rehab facility after arriving from [facility] one day prior. Upon [facility] improvement in BP to ~ 190/80s and brought in for further eval. Of note, pt was admitted 1/25-1/31 from rehab for AMS and found Influ AH3 (+) w/ superimposed bacterial pna tx'd w/ 6 days zosyn and 5 days tamiflu. She had bradycardia on presentation, eval'ed by EP and determined to be slow afib w/ PVCs and cont'd on rate control.    Of note, pt has been wheelchair/bedbound since early Jan after the craniotomy. Previously has dysarthria but appears now resolved.     ED course  - T 98, HR 48-50, 150-170s/50-50-70s, RR 24, 99% on 2L  - Lab sig for no leukocytosis, hyperglycemia to 300, AG at 9, proBNp at 4000,   - 19:44 - VBG - pH: 7.40  | pCO2: 55    | pO2: 47    | Lactate: 1.8    - RVP (+), CXR w/ RLL consolidation c/w pna  - CTPA w/o evid of PE, BL pl effs

## 2018-02-02 NOTE — PROGRESS NOTE ADULT - PROBLEM SELECTOR PLAN 5
- was (+) w/ same virus on 1/25, unclear how long test would stay (+) for, however may be asymptomatically shedding.  Pt was noted to not have needed o2 at rehab facility, likely.  - No role for anti-virals/antibiotics at this point  - Robitussin PRN for cough

## 2018-02-02 NOTE — PROGRESS NOTE ADULT - PROBLEM SELECTOR PLAN 3
- Required 2L NC O2 in ED as she was tachypneic off of oxygen  - Patient with recent admission for bacterial PNA on top of influenza; CT repeated at admission 2/1; per radiology, lower lobe consolidation likely atelectasis with pleural effusions  - Afebrile, no leukocytosis; no role for ABx at this point  - likely with some component of HF give pl effs on imaging and crackles on lung ausculation  - s/p lasix 40 in ED w/ good UOP; given additional lasix this AM

## 2018-02-02 NOTE — DISCHARGE NOTE ADULT - MEDICATION SUMMARY - MEDICATIONS TO TAKE
I will START or STAY ON the medications listed below when I get home from the hospital:    acetaminophen 325 mg oral tablet  -- 2 tab(s) by mouth every 6 hours, As needed, Mild Pain (1 - 3)  -- Indication: For Pain    losartan 50 mg oral tablet  -- 1 tab(s) by mouth 2 times a day  -- Indication: For Hypertension    lacosamide 150 mg oral tablet  -- 1 tab(s) by mouth 2 times a day  -- Indication: For SDH (subdural hematoma)    insulin lispro 100 units/mL subcutaneous solution  --  subcutaneous 4 times a day (before meals and at bedtime); 2 Unit(s) if Glucose 151 - 200  4 Unit(s) if Glucose 201 - 250  6 Unit(s) if Glucose 251 - 300  8 Unit(s) if Glucose 301 - 350  10 Unit(s) if Glucose 351 - 400  12 Unit(s) if Glucose Greater Than 400  -- Indication: For Type 2 diabetes mellitus with hyperglycemia, with long-term current use of insulin    insulin lispro 100 units/mL subcutaneous solution  --  subcutaneous once a day (at bedtime); 0 Unit(s) if Glucose 0 - 250  1 Unit(s) if Glucose 251 - 300  2 Unit(s) if Glucose 301 - 350  3 Unit(s) if Glucose 351 - 400  4 Unit(s) if Glucose Greater Than 400  -- Indication: For Type 2 diabetes mellitus with hyperglycemia, with long-term current use of insulin    insulin glargine  -- 12 unit(s) subcutaneous once a day (at bedtime)  -- Indication: For Type 2 diabetes mellitus with hyperglycemia, with long-term current use of insulin    Crestor 10 mg oral tablet  -- 1 tab(s) by mouth once a day (at bedtime)  -- Indication: For Hyperlipidemia    amLODIPine 10 mg oral tablet  -- 1 tab(s) by mouth once a day  -- Indication: For Hypertension    furosemide 20 mg oral tablet  -- 1 tab(s) by mouth once a day  -- Indication: For Pleural effusion    guaiFENesin 100 mg/5 mL oral liquid  -- 5 milliliter(s) by mouth every 6 hours, As needed, Cough  -- Indication: For Cough    docusate sodium 100 mg oral capsule  -- 1 cap(s) by mouth once a day  -- Indication: For Constipation    polyethylene glycol 3350 oral powder for reconstitution  -- 17 gram(s) by mouth every 12 hours  -- Indication: For Constipation    senna oral tablet  -- 2 tab(s) by mouth once a day (at bedtime)  -- Indication: For Constipation

## 2018-02-02 NOTE — H&P ADULT - ATTENDING COMMENTS
Patient seen and examined at bedside.  Agree with the resident note above  Addendum and changes made to note above where appropriate.

## 2018-02-02 NOTE — PROGRESS NOTE ADULT - SUBJECTIVE AND OBJECTIVE BOX
Patient is a 89y old  Female who presents with a chief complaint of 89F p/w bradycardia and hypotension x 1 day. (02 Feb 2018 01:34)      SUBJECTIVE / OVERNIGHT EVENTS:  Patient admitted last night from rehab services after being discharged from [facility] on 1/31.  Per daughter, the patient had a heart rate in the low 40s and BP 120s/70s.  Patient was completely asymptomatic.  She was mentating normally and was able to participate fully in all activities.  EMS was called due to low heart rate; when EMS took BP, they found it at 80/40.  She was sent to [facility]; in the ED her BP was 180s/90s.    At this point in time patient has no complaints except for a persistent cough.      MEDICATIONS  (STANDING):  amLODIPine   Tablet 10 milliGRAM(s) Oral daily  atorvastatin 80 milliGRAM(s) Oral at bedtime  docusate sodium 100 milliGRAM(s) Oral daily  enoxaparin Injectable 30 milliGRAM(s) SubCutaneous daily  insulin glargine Injectable (LANTUS) 12 Unit(s) SubCutaneous at bedtime  lacosamide 150 milliGRAM(s) Oral every 12 hours  losartan 50 milliGRAM(s) Oral daily  polyethylene glycol 3350 17 Gram(s) Oral every 12 hours  senna 2 Tablet(s) Oral at bedtime      Vital Signs Last 24 Hrs  T(C): 36.8 (02 Feb 2018 07:33), Max: 36.9 (02 Feb 2018 02:14)  T(F): 98.2 (02 Feb 2018 07:33), Max: 98.5 (02 Feb 2018 02:14)  HR: 60 (02 Feb 2018 07:33) (48 - 60)  BP: 152/70 (02 Feb 2018 07:33) (152/54 - 184/67)  BP(mean): --  RR: 17 (02 Feb 2018 07:33) (16 - 25)  SpO2: 96% (02 Feb 2018 07:33) (94% - 100%)      CAPILLARY BLOOD GLUCOSE  POCT Blood Glucose.: 245 mg/dL (02 Feb 2018 09:14)  POCT Blood Glucose.: 271 mg/dL (02 Feb 2018 02:39)      PHYSICAL EXAM:  GENERAL:  Well-appearing, NAD  EYES:  EOMI, PERRLA, no icterus noted  ENMT:  MMM  NECK:  ROM intact  CHEST/LUNG:  Some crackles bilateral lung base that are soft, otherwise CTA  HEART:  Bradycardic; irregular rate; soft systolic ejection murmur  ABDOMEN:  +BS, NT/D  EXTREMITIES:  Non-palpable peripheral pulses  PSYCH:  A&OX3, euthymic, speech difficult to understand but appropriate  NEUROLOGY:  No focal deficits noted  SKIN:  Intact to gross observation      LABS:                        11.4   5.1   )-----------( x        ( 02 Feb 2018 07:37 )             33.5     02-02    140  |  98  |  13  ----------------------------<  261<H>  3.7   |  31  |  0.76    Ca    9.9      02 Feb 2018 07:37  Phos  1.9     02-02  Mg     2.1     02-01    TPro  6.9  /  Alb  3.2<L>  /  TBili  0.7  /  DBili  x   /  AST  19  /  ALT  12  /  AlkPhos  76  02-02    LIVER FUNCTIONS - ( 02 Feb 2018 07:37 )  Alb: 3.2 g/dL / Pro: 6.9 g/dL / ALK PHOS: 76 U/L / ALT: 12 U/L RC / AST: 19 U/L / GGT: x           PT/INR - ( 01 Feb 2018 19:44 )   PT: 11.4 sec;   INR: 1.05 ratio      PTT - ( 01 Feb 2018 19:44 )  PTT:29.2 sec  CARDIAC MARKERS ( 02 Feb 2018 07:37 )  x     / 0.01 ng/mL / 29 U/L / x     / 1.1 ng/mL  CARDIAC MARKERS ( 01 Feb 2018 19:44 )  x     / 0.01 ng/mL / 47 U/L / x     / 2.0 ng/mL      RADIOLOGY & ADDITIONAL TESTS:  Imaging Personally Reviewed: YES    Consultant(s) Notes Reviewed: YES    Care Discussed with Consultants/Other Providers: YES      Shaka Trivedi MD PGY-1  Department of Medicine  [phone]

## 2018-02-02 NOTE — DISCHARGE NOTE ADULT - CARE PLAN
Principal Discharge DX:	Bradycardia  Goal:	Maintenance of regular heart rate  Assessment and plan of treatment:	You were admitted to the hospital for bradycardia, with a heart rate in the low 40s.  This is a normal heart rate for you, and does not warrant any further medical treatment.  You were on a medication called metoprolol for controlling your blood pressure; because this can lower your heart rate, please do not take this medication anymore.  Secondary Diagnosis:	Pleural effusion  Assessment and plan of treatment:	You have mild bilateral pleural effusions, which is fluid on your lungs.  This can impair how well you oxygenate.  Please take your home Lasix as prescribed in the morning to help prevent fluid from building in your lungs.  Secondary Diagnosis:	Cough  Assessment and plan of treatment:	You were admitted with a cough, which is due to your recent viral infection.  Unfortunately it will take at least 6 weeks for this to fully resolve.  Please take the robitussin as needed to help manage your symptoms.

## 2018-02-02 NOTE — H&P ADULT - PROBLEM SELECTOR PLAN 3
- appears c/w slow afib, on prev admission cards had wanted to c/w rate control  - will hold overnight, pt w/ stable asymptomatic bradycardia to high 40s-50s, as  she was during prev admission, no ekg changes except for less PVS and no pauses identified on 12 lead.   - would hold rate control overnight, cards consult in AM regarding need for RC going forward. - plan as above for acute resp failure. - appears c/w slow afib, on prev admission cards had wanted to c/w rate control  - will hold overnight, pt w/ stable asymptomatic bradycardia to high 40s-50s, as  she was during prev admission, no ekg changes except for less PVS and no pauses identified on 12 lead.   - would hold rate control overnight, cards consult in AM regarding need for RC going forward. At this juncture would consider conduction study if this is culprit of bradycardia.  -Less likely bradycardia is cerebral induced.

## 2018-02-02 NOTE — H&P ADULT - PROBLEM SELECTOR PLAN 1
- was (+) w/ same virus on 1/25, unclear how long test would stay (+) for, however likely to have some URI given incr'd o2 req's. Pt was noted to not have needed o2 at rehab facility, will begin tx with tamiflu again for 5 days. Would rec that day team reach to rehab regarding need for ppx for other patients/employees - appears c/w slow afib, on prev admission cards had wanted to c/w rate control  - will hold overnight, pt w/ stable asymptomatic bradycardia to high 40s-50s, as  she was during prev admission, no ekg changes except for less PVS and no pauses identified on 12 lead.   - would hold rate control overnight, cards consult in AM regarding need for RC going forward. - likely mixed picture w/ etiologies of AH3 inf and CHF exac. with incr'd o2 req's to maintain sats in mid to high 90s and was tachypnea off o2, however pt w/o wheezing or cough or runny nose or conversation dyspnea, Is mentating well  - cxr shows RLL consolidition but on CT chest, radiology felt appeared more like atelecatasis than consolidation and felt pna was at least radiologically unlikely  - ok to monitor off abx for now.   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   - s/p lasix 40 in ED w/ good UOP, will give additional lasix 40mg at 6am.  - consider repeat TTE in AM, last TTE on 1/5 w/ EF 75%, mod MS, mod AS, no diastolic dysfxn.

## 2018-02-02 NOTE — H&P ADULT - PROBLEM SELECTOR PLAN 5
- appears stable on CT head tonight.   - c/w seizure ppx. - was (+) w/ same virus on 1/25, unclear how long test would stay (+) for, however may be asymptomatically shedding. incr'd o2 req's likely 2/2 HF exac. Pt was noted to not have needed o2 at rehab facility, likely. - was (+) w/ same virus on 1/25, unclear how long test would stay (+) for, however may be asymptomatically shedding. incr'd o2 req's likely 2/2 HF exac. Pt was noted to not have needed o2 at rehab facility, likely.  -Patient was already treated w tamiflu and zosyn for superimposed pneumonia but at this point remains afebrile and normal leukocytosis so unlikely active infectious process.

## 2018-02-02 NOTE — DISCHARGE NOTE ADULT - CARE PROVIDER_API CALL
Nissenbaum, Michael A (MD), Neurology  [address]  [place] [address]  Phone: [phone]  Fax: [phone]    Walter Gomez), Neurological Surgery  [address] [place] [address]  Phone: [phone]  Fax: [phone]

## 2018-02-02 NOTE — DISCHARGE NOTE ADULT - ADDITIONAL INSTRUCTIONS
Check a BMP every 3 days to monitor electrolytes.    Per cardiology during last admission bradycardia is acceptable as long as patient is asymptomatic.  Stop taking metoprolol.

## 2018-02-02 NOTE — H&P ADULT - NSHPREVIEWOFSYSTEMS_GEN_ALL_CORE
General: Denies fever, chills, weight loss  HEENT: Denies vision changes  Cardio: Denies CP, palpitations  Pulm: Denies SOB, wheezing, cough,   GI: Denies nausea, vomiting, abdominal pain, dark stools  Neuro: Denies focal weakness, headaches, decreased/altered sensation  Musc Skel: Denies back pain, joint pain  Uro: Denies dysuria  Skin: Denies recent rashes  Endocrine: Denies polyuria, polydipsia  Psych: Denies anxiety and depressed mood

## 2018-02-02 NOTE — DISCHARGE NOTE ADULT - PLAN OF CARE
Maintenance of regular heart rate You were admitted to the hospital for bradycardia, with a heart rate in the low 40s.  This is a normal heart rate for you, and does not warrant any further medical treatment.  You were on a medication called metoprolol for controlling your blood pressure; because this can lower your heart rate, please do not take this medication anymore. You have mild bilateral pleural effusions, which is fluid on your lungs.  This can impair how well you oxygenate.  Please take your home Lasix as prescribed in the morning to help prevent fluid from building in your lungs. You were admitted with a cough, which is due to your recent viral infection.  Unfortunately it will take at least 6 weeks for this to fully resolve.  Please take the robitussin as needed to help manage your symptoms.

## 2018-02-02 NOTE — PROGRESS NOTE ADULT - PROBLEM SELECTOR PLAN 4
- May repeat TTE today as last TTE on 1/5 w/ EF 75%, mod MS, mod AS, no diastolic dysfxn.  - daily weights  - strict I/os - daily weights  - strict I/os

## 2018-02-02 NOTE — H&P ADULT - PROBLEM SELECTOR PLAN 9
- discussed chest compressions and tracheal intubation with pt as options in the event that cardiac or respiratory function declines to a life threatening level, pt would want both options pursued as potential life saving measures.   - FULL CODE, but is considering changing her mind. - appears ok to use ppx dose lovenox in stable sdh, will start now/ - cannot rule out aspiration, though appears unlikely    - speech and swallow eval in AM, npo now.

## 2018-02-02 NOTE — H&P ADULT - ASSESSMENT
89F PMHx of recent SDH s/p left craniotomy/evacuation (1/4/18), HTN, T2DM, Afib (on ASA 81), prior Endocarditis (2010, treated), Cervical Ca s/p Hysterectomy (2000, c/b adhesions and bowel obstruction and PNA with difficulty weaning off ventilator requiring trach) now presenting w/  hypotension and bradycardia to 40s while at rehab facility after arriving from [facility] one day prior, found hypertensive to 170s in ED, RVP (+), pl effs on imaging and given lasix 40mg with improvement in symptoms.

## 2018-02-02 NOTE — PROGRESS NOTE ADULT - PROBLEM SELECTOR PLAN 7
- Speech and swallow eval at last admission, low utility in repeating study; will re-start dysphagia I diet

## 2018-02-02 NOTE — ED ADULT NURSE REASSESSMENT NOTE - NS ED NURSE REASSESS COMMENT FT1
Patient resting on stretcher, a&o x2, in no acute distress. RVP positive. Droplet precautions in place. Patient is admitted, awaiting bed assignment.

## 2018-02-02 NOTE — H&P ADULT - PROBLEM SELECTOR PLAN 8
- appears ok to use ppx dose lovenox in stable sdh, will start now/ - cannot rule out aspiration, though appears unlikely    - speech and swallow eval in AM, npo now. - was dc'd on 12u w/ ssi. FSG in 300s, will give 12u lantus now, c/w ssi for additional coverage. may have missed coverage during transit as a1c on 1/4 = 6.2 and represents over control for her age of 89 years  - titrate down insulin as appropriate starting tmrw.

## 2018-02-02 NOTE — PROGRESS NOTE ADULT - PROBLEM SELECTOR PLAN 6
- was dc'd on 12u w/ ssi. FSG in 300s, will give 12u lantus now, c/w ssi for additional coverage. may have missed coverage during transit as a1c on 1/4 = 6.2 and represents over control for her age of 89 years  - titrate down insulin as appropriate starting tmrw.  - Consistent carbohydrate diet - was dc'd on 12u w/ ssi. FSG in 300s, will give 12u lantus now, c/w ssi for additional coverage. may have missed coverage during transit as a1c on 1/4 = 6.2 and represents over control for her age of 89 years  - Consistent carbohydrate diet

## 2018-02-02 NOTE — H&P ADULT - PROBLEM SELECTOR PLAN 4
- plan as above for bradycardia - plan as above for bradycardia  - No anticoagulation given recent subdural

## 2018-02-02 NOTE — H&P ADULT - PROBLEM SELECTOR PLAN 2
- likely 2/2 AH3 inf, with incr'd o2 req's to maintain sats in mid to high 90s and was tachypnea off o2, however pt w/o wheezing or cough or runny nose or conversation dyspnea, Is mentating well  - cxr shows RLL consolidition but on CT chest, radiology felt appeared more like atelecatasis than consolidation and felt pna was at least radiologically unlikely  - ok to monitor off abx for now.   - likely with some component of HF give pl effs on imaging and crackles on lung ausculation  - s/p lasix 40 in ED w/ good UOP, will give additional lasix 40mg at 6am. - likely mixed picture w/ etiologies of AH3 inf and CHF exac,  with incr'd o2 req's to maintain sats in mid to high 90s and was tachypnea off o2, however pt w/o wheezing or cough or runny nose or conversation dyspnea, Is mentating well  - cxr shows RLL consolidition but on CT chest, radiology felt appeared more like atelecatasis than consolidation and felt pna was at least radiologically unlikely  - ok to monitor off abx for now.   - likely with some component of HF give pl effs on imaging and crackles on lung ausculation  - s/p lasix 40 in ED w/ good UOP, will give additional lasix 40mg at 6am. - likely mixed picture w/ etiologies of AH3 inf and CHF exac. with incr'd o2 req's to maintain sats in mid to high 90s and was tachypnea off o2, however pt w/o wheezing or cough or runny nose or conversation dyspnea, Is mentating well  - cxr shows RLL consolidition but on CT chest, radiology felt appeared more like atelecatasis than consolidation and felt pna was at least radiologically unlikely  - ok to monitor off abx for now.   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   - s/p lasix 40 in ED w/ good UOP, will give additional lasix 40mg at 6am.  - consider repeat TTE in AM, last TTE on 1/5 w/ EF 75%, mod MS, mod AS, no diastolic dysfxn. - plan as above for acute resp failure.  - daily weights  - strict I/os  - can reconsider TTE to assess extent of mitral stenosis

## 2018-02-02 NOTE — H&P ADULT - PROBLEM SELECTOR PROBLEM 8
Need for prophylactic measure Dysphagia, unspecified type Type 2 diabetes mellitus with hyperglycemia, with long-term current use of insulin

## 2018-02-02 NOTE — H&P ADULT - PROBLEM SELECTOR PROBLEM 6
Type 2 diabetes mellitus with hyperglycemia, with long-term current use of insulin SDH (subdural hematoma) Advance care planning

## 2018-02-02 NOTE — PROGRESS NOTE ADULT - PROBLEM SELECTOR PLAN 10
- VTE:  Lovenox  - Dispo:  Back to [facility] pending resolution of bradycardia    Shaka Trivedi MD PGY-1  Department of Medicine  [phone] - VTE:  Lovenox  - Dispo:  Back to [facility] today.    Shaka Trivedi MD PGY-1  Department of Medicine  [phone] - VTE:  Lovenox  - Dispo:  Back to [facility] today. 45 minutes spent arranging the discharge.     Shaka Trivedi MD PGY-1  Department of Medicine  [phone]

## 2018-02-02 NOTE — PROGRESS NOTE ADULT - PROBLEM SELECTOR PLAN 1
- appears c/w slow afib; similar to prior admission on 1/31  - Asymptomatic  - Orthostatic vitals  - Patient on metoprolol 25 ER; will hold today  - No changes on 12 lead EKG from prior admission  - Will consider cardiology consult for conduction study for slow heart rate this AM  - No suspicion for cerebral related bradycardia as patient does not have any changes in mental status nor any focal neurological deficits - appears c/w slow afib; similar to prior admission on 1/31  - Asymptomatic  - Orthostatic vitals  - Patient on metoprolol 25 ER; will hold today  - No changes on 12 lead EKG from prior admission  - Patient is asymptomatic with her bradycardia; this was known within the last hospital admission.  Do not need any further w/u.  OK to hold metoprolol as future outpatient, with adjustment to anti-HTN medications

## 2018-02-02 NOTE — DISCHARGE NOTE ADULT - PATIENT PORTAL LINK FT
“You can access the [employer] Patient Portal, offered by [facility], by registering with the following website: [email or web address]”

## 2018-02-02 NOTE — DISCHARGE NOTE ADULT - MEDICATION SUMMARY - MEDICATIONS TO STOP TAKING
I will STOP taking the medications listed below when I get home from the hospital:    Metoprolol Succinate ER 25 mg oral tablet, extended release  -- 1 tab(s) by mouth once a day

## 2018-02-02 NOTE — H&P ADULT - PROBLEM SELECTOR PROBLEM 9
Advanced care planning/counseling discussion Need for prophylactic measure Dysphagia, unspecified type

## 2018-02-02 NOTE — H&P ADULT - PROBLEM SELECTOR PLAN 7
- cannot rule out aspiration, though appears unlikely    - speech and swallow eval in AM, npo now. - was dc'd on 12u w/ ssi. FSG in 300s, will give 12u lantus now, c/w ssi for additional coverage. may have missed coverage during transit as a1c on 1/4 = 6.2 and represents over control for her age of 89 years  - titrate down insulin as appropriate starting tmrw. - appears stable on CT head tonight.   - c/w seizure ppx.
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
## 2018-02-02 NOTE — H&P ADULT - PROBLEM SELECTOR PROBLEM 7
Dysphagia, unspecified type Type 2 diabetes mellitus with hyperglycemia, with long-term current use of insulin SDH (subdural hematoma)

## 2018-02-02 NOTE — H&P ADULT - PROBLEM SELECTOR PLAN 6
- was dc'd on 12u w/ ssi. FSG in 300s, will give 12u lantus now, c/w ssi for additional coverage. may have missed coverage during transit as a1c on 1/4 = 6.2 and represents over control for her age of 89 years  - titrate down insulin as appropriate starting tmrw. - appears stable on CT head tonight.   - c/w seizure ppx. Spent 22 minutes total time discussing CPR/intubation with patient face-to-face at bedside. Patient wants both of these measures performed if situation required. She has full decision making capacity and is able to communicate extent of her illness.

## 2018-02-02 NOTE — DISCHARGE NOTE ADULT - OTHER SIGNIFICANT FINDINGS
EXAM:  CT ANGIO CHEST (W)AW IC                            PROCEDURE DATE:  02/01/2018            INTERPRETATION:  CLINICAL INFORMATION: Acute respiratory failure. Chest   pain.    COMPARISON: None.    PROCEDURE:   CT Angiography of the Chest.  90 ml of Omnipaque 350 was injected intravenously. 10 ml were discarded.  Sagittal and coronal reformats were performed as well as 3D (MIP)   reconstructions.      FINDINGS:    CHEST:     LUNGS AND LARGE AIRWAYS: Patent central airways. Scattered groundglass   opacities are geographic in distribution, consistent with mosaic   attenuation. Passive atelectasis adjacent to the pleural effusions.  PLEURA: Small right and trace left pleural effusions.  VESSELS: There is no pulmonary embolism. Atherosclerotic calcifications.   HEART: Heart size is enlarged. Aortic valve and coronary artery   calcifications. No pericardial effusion.  MEDIASTINUM AND JODIE: No lymphadenopathy.  CHEST WALL AND LOWER NECK: Within normal limits.  VISUALIZED UPPER ABDOMEN: Within normal limits.  BONES: Degenerative changes of the spine.    IMPRESSION:     No pulmonary embolism.    Small right and trace left pleural effusions.

## 2018-02-02 NOTE — H&P ADULT - PROBLEM SELECTOR PLAN 10
- discussed chest compressions and tracheal intubation with pt as options in the event that cardiac or respiratory function declines to a life threatening level, pt would want both options pursued as potential life saving measures.   - FULL CODE, but is considering changing her mind. - appears ok to use ppx dose lovenox in stable sdh, will start now.

## 2018-02-02 NOTE — H&P ADULT - NSHPPHYSICALEXAM_GEN_ALL_CORE
Vital Signs Last 24 Hrs  T(C): 36.7 (01 Feb 2018 18:34), Max: 36.7 (01 Feb 2018 18:34)  T(F): 98 (01 Feb 2018 18:34), Max: 98 (01 Feb 2018 18:34)  HR: 49 (02 Feb 2018 00:48) (48 - 50)  BP: 164/56 (02 Feb 2018 00:48) (152/54 - 179/61)  BP(mean): --  RR: 24 (02 Feb 2018 00:48) (18 - 25)  SpO2: 100% (02 Feb 2018 00:48) (94% - 100%)    PHYSICAL EXAM:  General: NAD, well-developed, no dysarthria.   Eyes: EOMI  Neck: Supple, No JVD  Chest/Lung: (+) crackles LL BL, no wheezes.   Heart: silvio and regular No murmurs, rubs, or gallops. Capillary refill WNL  Abdom: Soft, Nontender, Nondistended; Bowel sounds present  Extremities: No lower extremity edema.   Psych: AAOx3  Neurology: BL LE w/ 2.5 str, BL UE w/ 5/5 str, sensation intact.   Skin: No rashes or lesions PHYSICAL EXAM:  Vital Signs Last 24 Hrs  T(C): 36.9 (02-02-18 @ 02:14)  T(F): 98.5 (02-02-18 @ 02:14), Max: 98.5 (02-02-18 @ 02:14)  HR: 50 (02-02-18 @ 02:14) (48 - 50)  BP: 171/55 (02-02-18 @ 02:14)  BP(mean): --  RR: 24 (02-02-18 @ 02:14) (18 - 25)  SpO2: 100% (02-02-18 @ 02:14) (94% - 100%)  Wt(kg): --    Constitutional: NAD, awake and alert  EYES: EOMI  ENT:  Hard of hearing, no tonsillar exudates   Neck: Soft and supple , no thyromegaly   Respiratory: Breath sounds c/w basilar crackle but no respiratory distress   Cardiovascular: S1 and S2, regular rate and rhythm, 3/6 systolic murmur best heard at right 2nd ICS, no gallops or rubs, no JVD,    Gastrointestinal: Bowel Sounds present, soft, nontender, nondistended, no guarding, no rebound  Extremities: No cyanosis or clubbing; warm to touch  Vascular: 2+ peripheral pulses lower ex  Neurological:  CN appear grossly intact but difficult to assess, sensation to light touch intact in all extremities, gait deferred. Upper extremities b/l 5/5. Lower ext 2/5 b/l; withdraws to painful stimuli. +dyasthria  Musculoskeletal:  no joint swelling or erythema   Skin: No rashes  Psych: no depression or anhedonia, AAOx3  HEME: no bruises, no nose bleeds

## 2018-02-11 ENCOUNTER — INPATIENT (INPATIENT)
Facility: HOSPITAL | Age: 83
LOS: 15 days | Discharge: HOME HEALTH | DRG: 291 | End: 2018-02-27
Attending: HOSPITALIST | Admitting: HOSPITALIST
Payer: MEDICARE

## 2018-02-11 VITALS
TEMPERATURE: 99 F | DIASTOLIC BLOOD PRESSURE: 72 MMHG | RESPIRATION RATE: 18 BRPM | OXYGEN SATURATION: 88 % | HEART RATE: 35 BPM | SYSTOLIC BLOOD PRESSURE: 123 MMHG

## 2018-02-11 DIAGNOSIS — G93.40 ENCEPHALOPATHY, UNSPECIFIED: ICD-10-CM

## 2018-02-11 DIAGNOSIS — R74.8 ABNORMAL LEVELS OF OTHER SERUM ENZYMES: ICD-10-CM

## 2018-02-11 DIAGNOSIS — N17.9 ACUTE KIDNEY FAILURE, UNSPECIFIED: ICD-10-CM

## 2018-02-11 DIAGNOSIS — Z98.890 OTHER SPECIFIED POSTPROCEDURAL STATES: Chronic | ICD-10-CM

## 2018-02-11 DIAGNOSIS — I48.91 UNSPECIFIED ATRIAL FIBRILLATION: ICD-10-CM

## 2018-02-11 DIAGNOSIS — E87.1 HYPO-OSMOLALITY AND HYPONATREMIA: ICD-10-CM

## 2018-02-11 DIAGNOSIS — R00.1 BRADYCARDIA, UNSPECIFIED: ICD-10-CM

## 2018-02-11 DIAGNOSIS — Z90.710 ACQUIRED ABSENCE OF BOTH CERVIX AND UTERUS: Chronic | ICD-10-CM

## 2018-02-11 DIAGNOSIS — Z71.89 OTHER SPECIFIED COUNSELING: ICD-10-CM

## 2018-02-11 DIAGNOSIS — I62.00 NONTRAUMATIC SUBDURAL HEMORRHAGE, UNSPECIFIED: ICD-10-CM

## 2018-02-11 LAB
ALBUMIN SERPL ELPH-MCNC: 3.3 G/DL — SIGNIFICANT CHANGE UP (ref 3.3–5)
ALP SERPL-CCNC: 147 U/L — HIGH (ref 40–120)
ALT FLD-CCNC: 70 U/L RC — HIGH (ref 10–45)
ANION GAP SERPL CALC-SCNC: 11 MMOL/L — SIGNIFICANT CHANGE UP (ref 5–17)
APPEARANCE UR: ABNORMAL
APTT BLD: 26.4 SEC — LOW (ref 27.5–37.4)
AST SERPL-CCNC: 33 U/L — SIGNIFICANT CHANGE UP (ref 10–40)
BACTERIA # UR AUTO: ABNORMAL /HPF
BASOPHILS # BLD AUTO: 0 K/UL — SIGNIFICANT CHANGE UP (ref 0–0.2)
BASOPHILS NFR BLD AUTO: 0.5 % — SIGNIFICANT CHANGE UP (ref 0–2)
BILIRUB SERPL-MCNC: 0.3 MG/DL — SIGNIFICANT CHANGE UP (ref 0.2–1.2)
BILIRUB UR-MCNC: NEGATIVE — SIGNIFICANT CHANGE UP
BLD GP AB SCN SERPL QL: NEGATIVE — SIGNIFICANT CHANGE UP
BUN SERPL-MCNC: 63 MG/DL — HIGH (ref 7–23)
CALCIUM SERPL-MCNC: 9.4 MG/DL — SIGNIFICANT CHANGE UP (ref 8.4–10.5)
CHLORIDE SERPL-SCNC: 86 MMOL/L — LOW (ref 96–108)
CO2 SERPL-SCNC: 29 MMOL/L — SIGNIFICANT CHANGE UP (ref 22–31)
COLOR SPEC: YELLOW — SIGNIFICANT CHANGE UP
CREAT SERPL-MCNC: 1.76 MG/DL — HIGH (ref 0.5–1.3)
DIFF PNL FLD: NEGATIVE — SIGNIFICANT CHANGE UP
EOSINOPHIL # BLD AUTO: 0.1 K/UL — SIGNIFICANT CHANGE UP (ref 0–0.5)
EOSINOPHIL NFR BLD AUTO: 2.4 % — SIGNIFICANT CHANGE UP (ref 0–6)
EPI CELLS # UR: SIGNIFICANT CHANGE UP /HPF
GAS PNL BLDA: SIGNIFICANT CHANGE UP
GAS PNL BLDV: SIGNIFICANT CHANGE UP
GLUCOSE SERPL-MCNC: 159 MG/DL — HIGH (ref 70–99)
GLUCOSE UR QL: NEGATIVE — SIGNIFICANT CHANGE UP
HCT VFR BLD CALC: 30.3 % — LOW (ref 34.5–45)
HGB BLD-MCNC: 10.4 G/DL — LOW (ref 11.5–15.5)
HYALINE CASTS # UR AUTO: ABNORMAL
INR BLD: 1.07 RATIO — SIGNIFICANT CHANGE UP (ref 0.88–1.16)
KETONES UR-MCNC: NEGATIVE — SIGNIFICANT CHANGE UP
LEUKOCYTE ESTERASE UR-ACNC: ABNORMAL
LYMPHOCYTES # BLD AUTO: 1.3 K/UL — SIGNIFICANT CHANGE UP (ref 1–3.3)
LYMPHOCYTES # BLD AUTO: 22.9 % — SIGNIFICANT CHANGE UP (ref 13–44)
MAGNESIUM SERPL-MCNC: 3.2 MG/DL — HIGH (ref 1.6–2.6)
MCHC RBC-ENTMCNC: 31.8 PG — SIGNIFICANT CHANGE UP (ref 27–34)
MCHC RBC-ENTMCNC: 34.3 GM/DL — SIGNIFICANT CHANGE UP (ref 32–36)
MCV RBC AUTO: 92.4 FL — SIGNIFICANT CHANGE UP (ref 80–100)
MONOCYTES # BLD AUTO: 1 K/UL — HIGH (ref 0–0.9)
MONOCYTES NFR BLD AUTO: 17 % — HIGH (ref 2–14)
NEUTROPHILS # BLD AUTO: 3.3 K/UL — SIGNIFICANT CHANGE UP (ref 1.8–7.4)
NEUTROPHILS NFR BLD AUTO: 57.2 % — SIGNIFICANT CHANGE UP (ref 43–77)
NITRITE UR-MCNC: NEGATIVE — SIGNIFICANT CHANGE UP
OSMOLALITY SERPL: 284 MOS/KG — SIGNIFICANT CHANGE UP (ref 275–300)
PH UR: 5.5 — SIGNIFICANT CHANGE UP (ref 5–8)
PHOSPHATE SERPL-MCNC: 3.8 MG/DL — SIGNIFICANT CHANGE UP (ref 2.5–4.5)
PLATELET # BLD AUTO: 269 K/UL — SIGNIFICANT CHANGE UP (ref 150–400)
POTASSIUM SERPL-MCNC: 3.7 MMOL/L — SIGNIFICANT CHANGE UP (ref 3.5–5.3)
POTASSIUM SERPL-SCNC: 3.7 MMOL/L — SIGNIFICANT CHANGE UP (ref 3.5–5.3)
PROT SERPL-MCNC: 6.4 G/DL — SIGNIFICANT CHANGE UP (ref 6–8.3)
PROT UR-MCNC: SIGNIFICANT CHANGE UP
PROTHROM AB SERPL-ACNC: 11.6 SEC — SIGNIFICANT CHANGE UP (ref 9.8–12.7)
RBC # BLD: 3.28 M/UL — LOW (ref 3.8–5.2)
RBC # FLD: 16.5 % — HIGH (ref 10.3–14.5)
RBC CASTS # UR COMP ASSIST: SIGNIFICANT CHANGE UP /HPF (ref 0–2)
RH IG SCN BLD-IMP: POSITIVE — SIGNIFICANT CHANGE UP
SODIUM SERPL-SCNC: 126 MMOL/L — LOW (ref 135–145)
SP GR SPEC: 1.01 — SIGNIFICANT CHANGE UP (ref 1.01–1.02)
TROPONIN T SERPL-MCNC: 0.02 NG/ML — SIGNIFICANT CHANGE UP (ref 0–0.06)
TSH SERPL-MCNC: 2.51 UIU/ML — SIGNIFICANT CHANGE UP (ref 0.27–4.2)
UROBILINOGEN FLD QL: NEGATIVE — SIGNIFICANT CHANGE UP
WBC # BLD: 5.8 K/UL — SIGNIFICANT CHANGE UP (ref 3.8–10.5)
WBC # FLD AUTO: 5.8 K/UL — SIGNIFICANT CHANGE UP (ref 3.8–10.5)
WBC UR QL: SIGNIFICANT CHANGE UP /HPF (ref 0–5)

## 2018-02-11 PROCEDURE — 99223 1ST HOSP IP/OBS HIGH 75: CPT | Mod: GC

## 2018-02-11 PROCEDURE — 70450 CT HEAD/BRAIN W/O DYE: CPT | Mod: 26

## 2018-02-11 PROCEDURE — 72170 X-RAY EXAM OF PELVIS: CPT | Mod: 26

## 2018-02-11 PROCEDURE — 70450 CT HEAD/BRAIN W/O DYE: CPT | Mod: 26,77

## 2018-02-11 PROCEDURE — 93010 ELECTROCARDIOGRAM REPORT: CPT

## 2018-02-11 PROCEDURE — 99233 SBSQ HOSP IP/OBS HIGH 50: CPT | Mod: GC

## 2018-02-11 PROCEDURE — 71045 X-RAY EXAM CHEST 1 VIEW: CPT | Mod: 26

## 2018-02-11 PROCEDURE — 72125 CT NECK SPINE W/O DYE: CPT | Mod: 26

## 2018-02-11 PROCEDURE — 99291 CRITICAL CARE FIRST HOUR: CPT | Mod: 25,GC

## 2018-02-11 RX ORDER — CEFTRIAXONE 500 MG/1
1 INJECTION, POWDER, FOR SOLUTION INTRAMUSCULAR; INTRAVENOUS ONCE
Qty: 0 | Refills: 0 | Status: COMPLETED | OUTPATIENT
Start: 2018-02-11 | End: 2018-02-11

## 2018-02-11 RX ORDER — LACOSAMIDE 50 MG/1
150 TABLET ORAL EVERY 24 HOURS
Qty: 0 | Refills: 0 | Status: DISCONTINUED | OUTPATIENT
Start: 2018-02-11 | End: 2018-02-14

## 2018-02-11 RX ORDER — FUROSEMIDE 40 MG
20 TABLET ORAL ONCE
Qty: 0 | Refills: 0 | Status: COMPLETED | OUTPATIENT
Start: 2018-02-11 | End: 2018-02-11

## 2018-02-11 RX ORDER — CEFTRIAXONE 500 MG/1
1 INJECTION, POWDER, FOR SOLUTION INTRAMUSCULAR; INTRAVENOUS EVERY 24 HOURS
Qty: 0 | Refills: 0 | Status: DISCONTINUED | OUTPATIENT
Start: 2018-02-11 | End: 2018-02-11

## 2018-02-11 RX ORDER — CEFTRIAXONE 500 MG/1
1 INJECTION, POWDER, FOR SOLUTION INTRAMUSCULAR; INTRAVENOUS EVERY 24 HOURS
Qty: 0 | Refills: 0 | Status: DISCONTINUED | OUTPATIENT
Start: 2018-02-11 | End: 2018-02-14

## 2018-02-11 RX ORDER — LEVETIRACETAM 250 MG/1
500 TABLET, FILM COATED ORAL EVERY 12 HOURS
Qty: 0 | Refills: 0 | Status: DISCONTINUED | OUTPATIENT
Start: 2018-02-11 | End: 2018-02-14

## 2018-02-11 RX ORDER — CEFTRIAXONE 500 MG/1
1 INJECTION, POWDER, FOR SOLUTION INTRAMUSCULAR; INTRAVENOUS ONCE
Qty: 0 | Refills: 0 | Status: DISCONTINUED | OUTPATIENT
Start: 2018-02-11 | End: 2018-02-11

## 2018-02-11 RX ORDER — SODIUM CHLORIDE 9 MG/ML
1000 INJECTION INTRAMUSCULAR; INTRAVENOUS; SUBCUTANEOUS ONCE
Qty: 0 | Refills: 0 | Status: COMPLETED | OUTPATIENT
Start: 2018-02-11 | End: 2018-02-11

## 2018-02-11 RX ORDER — ROSUVASTATIN CALCIUM 5 MG/1
1 TABLET ORAL
Qty: 0 | Refills: 0 | COMMUNITY

## 2018-02-11 RX ADMIN — SODIUM CHLORIDE 1000 MILLILITER(S): 9 INJECTION INTRAMUSCULAR; INTRAVENOUS; SUBCUTANEOUS at 15:11

## 2018-02-11 RX ADMIN — CEFTRIAXONE 100 GRAM(S): 500 INJECTION, POWDER, FOR SOLUTION INTRAMUSCULAR; INTRAVENOUS at 16:45

## 2018-02-11 NOTE — ED PROVIDER NOTE - ATTENDING CONTRIBUTION TO CARE
Kelsie Eng MD - Attending Physician: I have personally seen and examined this patient with the resident/fellow.  I have fully participated in the care of this patient. I have reviewed all pertinent clinical information, including history, physical exam, plan and the Resident/Fellow’s note and agree except as noted. See MDM

## 2018-02-11 NOTE — CONSULT NOTE ADULT - ASSESSMENT
89F h/o chronic SDH here s/p fall at neurological baseline, no headache with CTH showing decreased chronic SDH with possible small acute component. Likely demonstrates redistribution of prior hemorrhage   - No acute neurosurgical intervention  - Repeat CTH in 4-6 hours, if stable no further neurosurgical intervention anticipated  - Neurochecks q4hrs
89F PMHx of recent SDH s/p left craniotomy/evacuation (1/4/18), HTN, T2DM, Afib (on ASA 81), prior Endocarditis (2010, treated), Cervical Ca s/p Hysterectomy (2000, c/b adhesions and bowel obstruction and PNA with difficulty weaning off ventilator requiring trach) p/w fall from Rehab    # Neuro  - Pt currently AOx3, nonfocal exam  - CTH appears unchanged from Feb 1st, NSx evaluated, no surgical interventions  - Would repeat CTH in 4-6 hours per Neurosurgery recs  - Neurochecks q4hrs     # CV- patient with Bradycardia on EKG, appears to be atrial flutter with 4 to 1 block. Would repeat EKG now  - Previously documented asymptomatic however patient now presents with a fall  - Would monitor on tele, keep K+> 4, Mg>2, Phos>2  - Continue to hold BB, no AC for flutter/fib 2/2 to SDH and falls  - Cardiology consult     # Respiratory  - C/w supplemental o2 PRN  - Currently satting 100% on 2L NC    #Renal  - Mild CRISTHIAN noted on initial labs  - Would do gentle hydration, 75cc/hr of NS for 12 hrs. Monitor respiratory status carefully as patient with moderate MS and AS noted on TTE from Jan 2018  - Monitor Kidney fxn, avoid nephrotoxic agents  - Monitor Na       Patient is not a MICU candidate at this time

## 2018-02-11 NOTE — H&P ADULT - PROBLEM SELECTOR PLAN 4
- new onset hyponatremia, unclear as to whether this is 2/2 SIADH in the setting of pain from fall vs hypovolemic hyponatremia   - urine studies pending - new onset hyponatremia, unclear as to whether this is 2/2 SIADH in the setting of pain from fall vs hypovolemic hyponatremia   - urine studies pending,

## 2018-02-11 NOTE — ED PROVIDER NOTE - CARE PLAN
Principal Discharge DX:	Bradycardia  Secondary Diagnosis:	Hyponatremia  Secondary Diagnosis:	UTI (urinary tract infection)
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
## 2018-02-11 NOTE — CONSULT NOTE ADULT - SUBJECTIVE AND OBJECTIVE BOX
p ([id number])     HPI:  89F h/o of chornic SDH s/p denia hole for evacuation 2018 not on any anticoagulation or antiplatelet agents here s/p possible fall at nursing home. Patient denies any headache. Patient daughter states she is at her neurological baseline, but does have episodes of lethargy. Patient denies any weakness, numbness, blurry vision, double vision, n/v.     PAST MEDICAL HISTORY   SDH (subdural hematoma)  Cervical cancer  Endocarditis  Hypertension  Diabetes mellitus  Atrial fibrillation    PAST SURGICAL HISTORY   H/O: hysterectomy  History of tracheostomy        MEDICATIONS:  Antibiotics:  cefTRIAXone   IVPB 1 Gram(s) IV Intermittent once    Neuro:    Anticoagulation:    Other:      SOCIAL HISTORY:   Occupation:   Marital Status:     FAMILY HISTORY:  No pertinent family history in first degree relatives      REVIEW OF SYSTEMS:  negative but for hpi  General:  Eyes:  ENT:  Cardiac:  Respiratory:  GI:  Musculoskeletal:   Skin:  Neurologic:   Psychiatric:     PHYSICAL EXAMINATION:   T(C): 37.2 (18 @ 14:52), Max: 37.2 (18 @ 14:52)  HR: 38 (18 @ 15:15) (35 - 38)  BP: 148/49 (18 @ 15:15) (123/72 - 148/49)  RR: 18 (18 @ 15:15) (18 - 18)  SpO2: 100% (18 @ 15:15) (88% - 100%)  Wt(kg): --    General Examination:     Neurologic Examination:           AOx3, FC, PERRL, EOMI, V1-3 intact, no facial, palate juve symmetric, tongue midline, shrug 5/5  5/5 throughout, no drift  SILT  No clonus or babinski    LABS:                        10.4   5.8   )-----------( 269      ( 2018 15:01 )             30.3     -    126<L>  |  86<L>  |  63<H>  ----------------------------<  159<H>  3.7   |  29  |  1.76<H>    Ca    9.4      2018 15:01  Phos  3.8       Mg     3.2         TPro  6.4  /  Alb  3.3  /  TBili  0.3  /  DBili  x   /  AST  33  /  ALT  70<H>  /  AlkPhos  147<H>  02-11    PT/INR - ( 2018 15:01 )   PT: 11.6 sec;   INR: 1.07 ratio         PTT - ( 2018 15:01 )  PTT:26.4 sec  Urinalysis Basic - ( 2018 15:02 )    Color: Yellow / Appearance: SL Turbid / S.014 / pH: x  Gluc: x / Ketone: Negative  / Bili: Negative / Urobili: Negative   Blood: x / Protein: Trace / Nitrite: Negative   Leuk Esterase: Large / RBC: 0-2 /HPF / WBC 11-25 /HPF   Sq Epi: x / Non Sq Epi: OCC /HPF / Bacteria: Few /HPF        RADIOLOGY & ADDITIONAL STUDIES:  IMPRESSION:  No acute intracranial hemorrhage. Slight interval increased hyperdensity   within left holoconvexity subdural collection which may represent   interval small increased subdural hemorrhage.  The collection overall   however has slightly decreased in size.    Evaluation for subtle nondisplaced fracture at C1-C2 is limited by   patient motion however, there is otherwise no evidence for fracture or   dislocation.    These findings were discussed with Dr. Pablo at 2018 3:51 PM by Dr. Ibrahim.                     RICHARD IBRAHIM M.D., ATTENDING RADIOLOGIST  This document has been electronically signed. 2018  3:52PM

## 2018-02-11 NOTE — H&P ADULT - NSHPPHYSICALEXAM_GEN_ALL_CORE
Vital Signs Last 24 Hrs  T(C): 37.2 (11 Feb 2018 14:52), Max: 37.2 (11 Feb 2018 14:52)  T(F): 98.9 (11 Feb 2018 14:52), Max: 98.9 (11 Feb 2018 14:52)  HR: 40 (11 Feb 2018 16:30) (35 - 40)  BP: 158/58 (11 Feb 2018 16:30) (123/72 - 158/58)  BP(mean): --  RR: 21 (11 Feb 2018 16:57) (18 - 22)  SpO2: 93% (11 Feb 2018 16:57) (88% - 100%)    General: elderly frail woman, lethargic. NAD  HEENT: PERRL, Cannot assess extraocular movements as patient will not open her eyes. no oropharyngeal erythema or irritation on inspection of the oral airway  Cardiovascular: Bradycardic with irregular rhythm, Systolic murmur appreciated. No rubs or gallops  Lungs: On 2L NC. Evidence of accessory muscle use with mouth breathing, intercostal retractions and belly breathing. On auscultation - - very diminished breath sounds with diffuse wheezing on anterior and posterior superior borders. Crackles appreciated at lateral bases   Abdomen: Soft and distended. Bowel sounds present. Nontender to palpation. No organomegaly   Extremities: 2+ pitting edema to the mid shin. No cyanosis and extremities otherwise well perfused.  Neuro: Patient unable to comply with neurologic exam. Moving head and neck spontaneously.   Skin: small amount of bruising on the forehead. No skin changes noted otherwise.

## 2018-02-11 NOTE — H&P ADULT - ASSESSMENT
89 year old female with PMHx of A-fib not on AC 2/2 SDH, T2DM, HTN, and cervical cancer s/p hysterectomy who presents after a fall with encephalopathy from outside nursing facility.

## 2018-02-11 NOTE — ED ADULT NURSE NOTE - OBJECTIVE STATEMENT
89 year old female a/ox3 brought in by ambulance from nursing home s/p fall from last night, unwitnessed. patient with pmh of acute on chronic subdural hematoma, had surgery and was d/c to nursing. patient returned to ED and admitted for +flu, d/c about 4 days ago back to nursing home. per family, patient was found sitting up next to floor and noted with altered mental status. upon arrival patient noted moaning and groaning, unable to answer questions. hr noted 35-40's with afib.  per medical chart, HR was in 40's during last admit. respirations even unlabored 02 on room air noted 80's, patient placed on NRB, with 02 increased to 100%. rectal temp 98.9.

## 2018-02-11 NOTE — H&P ADULT - ATTENDING COMMENTS
89 F w/ PMHx of A-fib not on AC, recent admission for SDH s/p craniotomy , T2DM, HTN, and cervical cancer s/p hysterectomy p/w fall at nursing facility, patient lethargic on exam but cognition intact, in mild respiratory distress and noted to be bradycardic  in 40's +3 pitting edema b/l , labs are significant for mild anemia, hyponatremia Na of 126 , CRISTHIAN , mild liver test elevation UA with LE andfew bacteria, repeat CT head without acute findings, CXR poor study , EKG afib with SVR ; patient recently started on keppra; suspect encephalopthy from hyponatremia , possibly 2/2 SIADH vs. decreased intravascular volume, will check urine studies and provide mild IV fluid challenge, monitor ins and outs , monitor on telemetry , will obtain EP consult for bradycardia , although low suspicion for UTI can give trial of antibiotics for positive UA and f/u blood and urine cultures. 89 F w/ PMHx of A-fib not on AC, recent admission for SDH s/p craniotomy , T2DM, HTN, and cervical cancer s/p hysterectomy p/w fall at nursing facility, patient lethargic on exam but cognition intact, in mild respiratory distress and noted to be bradycardic  in 40's +3 pitting edema b/l , labs are significant for mild anemia, hyponatremia Na of 126 , CRISTHIAN , mild liver test elevation UA with LE andfew bacteria, repeat CT head without acute findings, CXR poor study , EKG afib with SVR ; patient recently started on keppra; suspect encephalopthy from hyponatremia , possibly 2/2 SIADH vs. decreased intravascular volume, will check urine studies and provide mild IV fluid challenge, monitor ins and outs , monitor on telemetry , will obtain EP consult for bradycardia , although low suspicion for UTI can give trial of antibiotics for positive UA and f/u blood and urine cultures. Seen by Neurosurgery, will follow and repeat CTH in 12 hours.

## 2018-02-11 NOTE — ED PROVIDER NOTE - MEDICAL DECISION MAKING DETAILS
Kelsie Eng MD - Attending Physician: Pt here with AMS, recent subdural, recent flu and admission. Here somnolent, arousable to sternal rub, maintaining airway at this time. Labs, Ct head, Xr. Per daughter, patient is full code at this time. Will need admit

## 2018-02-11 NOTE — H&P ADULT - PROBLEM SELECTOR PLAN 7
- rates are in the 30s-40s and has been off of beta blocker since last discharge, currently normotensive  - off of anticoagulation in setting of SDH

## 2018-02-11 NOTE — H&P ADULT - PROBLEM SELECTOR PLAN 2
- hx of bradycardia on recent admission with metoprolol discontinued prior to discharge  - bradycardic into the 30s-40s with intermittent PVCs, EKG revealing of A-fib with slow ventricular response without evidence of heart block   - will get EP consult, currently not hypotensive and lactate not suggestive of ischemic process going on

## 2018-02-11 NOTE — H&P ADULT - PROBLEM SELECTOR PLAN 5
- Creatinine 0f 1.76 (0.76 at discharge), likely 2/2 prerenal CRISTHIAN, will start on MIVF and keep NPO in the interim  - renally dose meds  - avoid nephrotoxic agents

## 2018-02-11 NOTE — H&P ADULT - PROBLEM SELECTOR PLAN 6
- ALT>AST 2:1 suggestive of liver etiology, likely acute liver injury in the setting of bradycardia   - will continue to trend liver enzymes

## 2018-02-11 NOTE — CONSULT NOTE ADULT - SUBJECTIVE AND OBJECTIVE BOX
CHIEF COMPLAINT: Fall    HPI : 89F PMHx of recent SDH s/p left craniotomy/evacuation (18), HTN, T2DM, Afib (on ASA 81), prior Endocarditis (, treated), Cervical Ca s/p Hysterectomy (, c/b adhesions and bowel obstruction and PNA with difficulty weaning off ventilator requiring trach) p/w fall from Rehab. Patient is accompanied by her daughter. Patient denies HA, blurry vision, CP, SOB, abdominal pain, nausea, vomiting, fevers, chills. Endorses lethargy. Does not recall the fall, according to the daughter the fall was unwitnessed but patient was found on her back.  Patient daughter states she is at her neurological baseline.    Of note patient was hospitalized on  for bradycardia with Influenza; patient was found to be asymptomatic, Metoprolol was d/c'ed and she was discharged back to rehab.     VS in the ED: T: 98.9, HR: 35, BP: 123/72, RR: 21 100% on 2L NC    Labs personally reviewed: CBC with normocytic anemia, slightly decreased from . CMP with Hyponatremia to 126, CRISTHIAN of 1.76 ( Cr baseline is .8), Alk phos of 147, ALT of 70   ABG- pH 7.43, pCO2- 43, pO2- 135. No lactate      PAST MEDICAL & SURGICAL HISTORY:  SDH (subdural hematoma)  Cervical cancer: Stage I per the daughter s/p hysterectomy 2000 (complicated by: adhesions / pna)  Endocarditis:  (treated w/ antibiotics in [facility])  Hypertension  Diabetes mellitus  Atrial fibrillation: on aspirin (last taken 2018)  H/O: hysterectomy  History of tracheostomy: 2000 as a complication from her hysterectomy - developed pna and was unable to be weaned off the vent      FAMILY HISTORY:  No pertinent family history in first degree relatives      Allergies    No Known Allergies    Intolerances        HOME MEDICATIONS:    REVIEW OF SYSTEMS:  Constitutional: [ ] negative [ ] fevers [ ] chills [ ] weight loss [ ] weight gain  HEENT: [ ] negative [ ] dry eyes [ ] eye irritation [ ] postnasal drip [ ] nasal congestion  CV: [ ] negative  [ ] chest pain [ ] orthopnea [ ] palpitations [ ] murmur  Resp: [ ] negative [ ] cough [ ] shortness of breath [ ] dyspnea [ ] wheezing [ ] sputum [ ] hemoptysis  GI: [ ] negative [ ] nausea [ ] vomiting [ ] diarrhea [ ] constipation [ ] abd pain [ ] dysphagia   : [ ] negative [ ] dysuria [ ] nocturia [ ] hematuria [ ] increased urinary frequency  Musculoskeletal: [ ] negative [ ] back pain [ ] myalgias [ ] arthralgias [ ] fracture  Skin: [ ] negative [ ] rash [ ] itch  Neurological: [ ] negative [ ] headache [ ] dizziness [ ] syncope [ ] weakness [ ] numbness  Psychiatric: [ ] negative [ ] anxiety [ ] depression  Endocrine: [ ] negative [ ] diabetes [ ] thyroid problem  Hematologic/Lymphatic: [ ] negative [ ] anemia [ ] bleeding problem  Allergic/Immunologic: [ ] negative [ ] itchy eyes [ ] nasal discharge [ ] hives [ ] angioedema  [x ] All other systems negative  [ ] Unable to assess ROS because ________    OBJECTIVE:  ICU Vital Signs Last 24 Hrs  T(C): 37.2 (2018 14:52), Max: 37.2 (2018 14:52)  T(F): 98.9 (2018 14:52), Max: 98.9 (2018 14:52)  HR: 40 (2018 16:30) (35 - 40)  BP: 158/58 (2018 16:30) (123/72 - 158/58)  BP(mean): --  ABP: --  ABP(mean): --  RR: 21 (2018 16:57) (18 - 22)  SpO2: 93% (2018 16:57) (88% - 100%)        CAPILLARY BLOOD GLUCOSE          PHYSICAL EXAM:  General: NAD  HEENT:  PERRL, Small abrasion noted on forehead.   Neck:  Supple  Respiratory:  CTAB  Cardiovascular: S1 & S2, 3/6 Holosystolic Murmur loudest in L 2nd intercostal space  Abdomen: Soft, NDNT, BS present  Extremities:  Moves all ext t command, 2+ edema to knees b/L  Skin:  Intact   Neurological: Aox3, Non-focal exam.   Psychiatry: Approp    LINES: [facility]    HOSPITAL MEDICATIONS:    cefTRIAXone   IVPB 1 Gram(s) IV Intermittent every 24 hours    LABS:                        10.4   5.8   )-----------( 269      ( 2018 15:01 )             30.3     Hgb Trend: 10.4<--  -11    126<L>  |  86<L>  |  63<H>  ----------------------------<  159<H>  3.7   |  29  |  1.76<H>    Ca    9.4      2018 15:01  Phos  3.8       Mg     3.2         TPro  6.4  /  Alb  3.3  /  TBili  0.3  /  DBili  x   /  AST  33  /  ALT  70<H>  /  AlkPhos  147<H>      Creatinine Trend: 1.76<--, 0.76<--, 0.82<--, 0.71<--, 0.80<--, 0.91<--  PT/INR - ( 2018 15:01 )   PT: 11.6 sec;   INR: 1.07 ratio         PTT - ( 2018 15: )  PTT:26.4 sec  Urinalysis Basic - ( 2018 15:02 )    Color: Yellow / Appearance: SL Turbid / S.014 / pH: x  Gluc: x / Ketone: Negative  / Bili: Negative / Urobili: Negative   Blood: x / Protein: Trace / Nitrite: Negative   Leuk Esterase: Large / RBC: 0-2 /HPF / WBC 11-25 /HPF   Sq Epi: x / Non Sq Epi: OCC /HPF / Bacteria: Few /HPF      Arterial Blood Gas:   @ 18:12  7.43/43/135/28/100/3.6  ABG lactate: --    Venous Blood Gas:   @ 15:01  7.38/53/70/31/93  VBG Lactate: 1.5      MICROBIOLOGY:     RADIOLOGY:  [ X] Reviewed and interpreted by me    CXR- pulm edema noted with cardiomegaly. No consolidations or effusions    < from: CT Head No Cont (18 @ 15:35) >  No acute intracranial hemorrhage. Slight interval increased hyperdensity   within left holoconvexity subdural collection which may represent   interval small increased subdural hemorrhage.  The collection overall   however has slightly decreased in size.    Evaluation for subtle nondisplaced fracture at C1-C2 is limited by   patient motion however, there is otherwise no evidence for fracture or   dislocation.    < end of copied text   EKG: Atrial flutter

## 2018-02-11 NOTE — ED PROVIDER NOTE - PHYSICAL EXAMINATION
PE: CONSTITUTIONAL: Nontoxic, in no apparent distress, somnolent. ENMT: Airway patent, nasal mucosa clear, mouth with normal mucosa. HEAD: Mild frontal forehead contusion and ecchymosis, no fracture EYES: PERRL (right pupil with slightly more oval shape), EOMI bilaterally CARDIAC: bradycardic, no M/R/G RESPIRATORY: CTA bilaterally, no adventitious sounds GI: Abdomen non-distended, non-tender MSK: Spine appears normal, range of motion is not limited, no muscle/joint tenderness NEURO:  Unable to follow commands SKIN: Skin tone normal in color, warm and dry. No evidence of rash. PE: CONSTITUTIONAL: Nontoxic, in no apparent distress, somnolent. ENMT: Airway patent, nasal mucosa clear, mouth with normal mucosa. HEAD: Mild frontal forehead contusion and ecchymosis, no fracture EYES: PERRL (right pupil with slightly more oval shape), EOMI bilaterally CARDIAC: bradycardic, irregular, no M/R/G RESPIRATORY: CTA bilaterally, no adventitious sounds GI: Abdomen non-distended, non-tender MSK: Spine appears normal, range of motion is not limited, no muscle/joint tenderness NEURO:  Unable to follow commands SKIN: Skin tone normal in color, warm and dry. No evidence of rash.

## 2018-02-11 NOTE — H&P ADULT - PROBLEM SELECTOR PLAN 1
- likely 2/2 infectious vs trauma induced vs ?myxedema coma  - s/p fall with CTH not showing any evidence of acute ICH, and with decreased overall left subdural collection  - seen by NSGY with recommendation for repeat CT scan in 6 hours to assess whether intervention is necessary  - UA consistent with UTI findings -- will start on CTX  - hypoxic, hyponatremic, bradycardia with 2+ pitting edema possibly contributing to myxedema coma, will order TSH wnl 1/10/2018   - not likely 2/2 hypoxic/hypercapneic respiratory failure - - ABG not showing evidence of CO2 retention

## 2018-02-11 NOTE — H&P ADULT - PROBLEM SELECTOR PLAN 8
- As per conversation with both daughters - - patient is FULL CODE  - DVT PPx: SCDs     Anneliese Robles  PGY1  [phone]

## 2018-02-11 NOTE — CONSULT NOTE ADULT - SUBJECTIVE AND OBJECTIVE BOX
Date of Admission:    CHIEF COMPLAINT:    HISTORY OF PRESENT ILLNESS:      Allergies    No Known Allergies    Intolerances    	    MEDICATIONS:  furosemide   Injectable 20 milliGRAM(s) IV Push once    cefTRIAXone   IVPB 1 Gram(s) IV Intermittent every 24 hours      lacosamide IVPB 150 milliGRAM(s) IV Intermittent every 24 hours  levETIRAcetam  IVPB 500 milliGRAM(s) IV Intermittent every 12 hours            PAST MEDICAL & SURGICAL HISTORY:  SDH (subdural hematoma)  Cervical cancer: Stage I per the daughter s/p hysterectomy april 2000 (complicated by: adhesions / pna)  Endocarditis: 2010 (treated w/ antibiotics in [facility])  Hypertension  Diabetes mellitus  Atrial fibrillation: on aspirin (last taken 1/2/2018)  H/O: hysterectomy  History of tracheostomy: april 2000 as a complication from her hysterectomy - developed pna and was unable to be weaned off the vent      FAMILY HISTORY:  No pertinent family history in first degree relatives      SOCIAL HISTORY:    [ ] Non-smoker  [ ] Smoker  [ ] Alcohol      REVIEW OF SYSTEMS:  General: no fatigue/malaise, weight loss/gain.  Skin: no rashes.  Ophthalmologic: no blurred vision, no loss of vision. 	  ENT: no sore throat, rhinorrhea, sinus congestion.  Respiratory: no SOB, cough or wheeze.  Gastrointestinal:  no N/V/D, no melena/hematemesis/hematochezia.  Genitourinary: no dysuria/hesitancy or hematuria.  Musculoskeletal: no myalgias or arthralgias.  Neurological: no changes in vision or hearing, no lightheadedness/dizziness, no syncope/near syncope	  Psychiatric: no unusual stress/anxiety.   Hematology/Lymphatics: no unusual bleeding, bruising and no lymphadenopathy.  Endocrine: no unusual thirst.   All others negative except as stated above and in HPI.    PHYSICAL EXAM:  T(C): 37.2 (02-11-18 @ 14:52), Max: 37.2 (02-11-18 @ 14:52)  HR: 40 (02-11-18 @ 16:30) (35 - 40)  BP: 158/58 (02-11-18 @ 16:30) (123/72 - 158/58)  RR: 21 (02-11-18 @ 16:57) (18 - 22)  SpO2: 93% (02-11-18 @ 16:57) (88% - 100%)  Wt(kg): --  I&O's Summary      Appearance: Normal	  HEENT:   Normal oral mucosa, PERRL, EOMI	  Lymphatic: No lymphadenopathy  Cardiovascular: Normal S1 S2, No JVD, No murmurs, No edema  Respiratory: Lungs clear to auscultation	  Psychiatry: A & O x 3, Mood & affect appropriate  Gastrointestinal:  Soft, Non-tender, + BS	  Skin: No rashes, No ecchymoses, No cyanosis	  Neurologic: Non-focal  Extremities: Normal range of motion, No clubbing, cyanosis or edema  Vascular: Peripheral pulses palpable 2+ bilaterally        LABS:	 	    CBC Full  -  ( 11 Feb 2018 15:01 )  WBC Count : 5.8 K/uL  Hemoglobin : 10.4 g/dL  Hematocrit : 30.3 %  Platelet Count - Automated : 269 K/uL  Mean Cell Volume : 92.4 fl  Mean Cell Hemoglobin : 31.8 pg  Mean Cell Hemoglobin Concentration : 34.3 gm/dL  Auto Neutrophil # : 3.3 K/uL  Auto Lymphocyte # : 1.3 K/uL  Auto Monocyte # : 1.0 K/uL  Auto Eosinophil # : 0.1 K/uL  Auto Basophil # : 0.0 K/uL  Auto Neutrophil % : 57.2 %  Auto Lymphocyte % : 22.9 %  Auto Monocyte % : 17.0 %  Auto Eosinophil % : 2.4 %  Auto Basophil % : 0.5 %    02-11    126<L>  |  86<L>  |  63<H>  ----------------------------<  159<H>  3.7   |  29  |  1.76<H>    Ca    9.4      11 Feb 2018 15:01  Phos  3.8     02-11  Mg     3.2     02-11    TPro  6.4  /  Alb  3.3  /  TBili  0.3  /  DBili  x   /  AST  33  /  ALT  70<H>  /  AlkPhos  147<H>  02-11      proBNP:   Lipid Profile:   HgA1c:   TSH:       CARDIAC MARKERS:      Troponin T, Serum (02.11.18 @ 15:01)    Troponin T, Serum: 0.02 ng/mL    TELEMETRY: 	    ECG:  	  RADIOLOGY:  OTHER: 	    PREVIOUS DIAGNOSTIC TESTING:      Echocardiogram:    < from: Transthoracic Echocardiogram (01.05.18 @ 11:32) >  Conclusions:  Technically difficult study.  1. Mitral annular calcification and calcified mitral  leaflets with decreased diastolic opening. Minimal mitral  regurgitation. Meantransmitral valve gradient equals 5 mm  Hg, consistent with moderate mitral stenosis. (HRabout 80  bpm)  2. Aortic valve not well visualized; appears to be a  calcified trileaflet valve with decreased opening. Peak  transaortic valve gradient equals 36 mm Hg, mean  transaortic valve gradient equals 23 mm Hg, estimated  aortic valve area equals 1.2 sqcm (by continuity equation),  aortic valve velocity time integral equals 59 cm,  consistent with moderate aortic stenosis. Consider  additional imaging of the aortic valve such as with  additional TTE or JOY imaging if clinically indicated. No  aortic valve regurgitation seen.  3. Endocardium not well visualized; grossly hyperdynamic  left ventricular systolic function. EF approximately 75% by  visual estimation.  4. The right ventricle is not well visualized; grossly  normal right ventricular systolic function.  *** No previous Echo exam.  	  	  ASSESSMENT/PLAN: Date of Admission:    CHIEF COMPLAINT:    HISTORY OF PRESENT ILLNESS:      Allergies    No Known Allergies    Intolerances    	    MEDICATIONS:  furosemide   Injectable 20 milliGRAM(s) IV Push once    cefTRIAXone   IVPB 1 Gram(s) IV Intermittent every 24 hours      lacosamide IVPB 150 milliGRAM(s) IV Intermittent every 24 hours  levETIRAcetam  IVPB 500 milliGRAM(s) IV Intermittent every 12 hours            PAST MEDICAL & SURGICAL HISTORY:  SDH (subdural hematoma)  Cervical cancer: Stage I per the daughter s/p hysterectomy april 2000 (complicated by: adhesions / pna)  Endocarditis: 2010 (treated w/ antibiotics in [facility])  Hypertension  Diabetes mellitus  Atrial fibrillation: on aspirin (last taken 1/2/2018)  H/O: hysterectomy  History of tracheostomy: april 2000 as a complication from her hysterectomy - developed pna and was unable to be weaned off the vent      FAMILY HISTORY:  No pertinent family history in first degree relatives      SOCIAL HISTORY:    [ ] Non-smoker  [ ] Smoker  [ ] Alcohol      REVIEW OF SYSTEMS:  General: no fatigue/malaise, weight loss/gain.  Skin: no rashes.  Ophthalmologic: no blurred vision, no loss of vision. 	  ENT: no sore throat, rhinorrhea, sinus congestion.  Respiratory: no SOB, cough or wheeze.  Gastrointestinal:  no N/V/D, no melena/hematemesis/hematochezia.  Genitourinary: no dysuria/hesitancy or hematuria.  Musculoskeletal: no myalgias or arthralgias.  Neurological: no changes in vision or hearing, no lightheadedness/dizziness, no syncope/near syncope	  Psychiatric: no unusual stress/anxiety.   Hematology/Lymphatics: no unusual bleeding, bruising and no lymphadenopathy.  Endocrine: no unusual thirst.   All others negative except as stated above and in HPI.    PHYSICAL EXAM:  T(C): 37.2 (02-11-18 @ 14:52), Max: 37.2 (02-11-18 @ 14:52)  HR: 40 (02-11-18 @ 16:30) (35 - 40)  BP: 158/58 (02-11-18 @ 16:30) (123/72 - 158/58)  RR: 21 (02-11-18 @ 16:57) (18 - 22)  SpO2: 93% (02-11-18 @ 16:57) (88% - 100%)  Wt(kg): --  I&O's Summary      Appearance: Normal	  HEENT:   Normal oral mucosa, PERRL, EOMI	  Lymphatic: No lymphadenopathy  Cardiovascular: Normal S1 S2, No JVD, No murmurs, No edema  Respiratory: Lungs clear to auscultation	  Psychiatry: A & O x 3, Mood & affect appropriate  Gastrointestinal:  Soft, Non-tender, + BS	  Skin: No rashes, No ecchymoses, No cyanosis	  Neurologic: Non-focal  Extremities: Normal range of motion, No clubbing, cyanosis or edema  Vascular: Peripheral pulses palpable 2+ bilaterally        LABS:	 	    CBC Full  -  ( 11 Feb 2018 15:01 )  WBC Count : 5.8 K/uL  Hemoglobin : 10.4 g/dL  Hematocrit : 30.3 %  Platelet Count - Automated : 269 K/uL  Mean Cell Volume : 92.4 fl  Mean Cell Hemoglobin : 31.8 pg  Mean Cell Hemoglobin Concentration : 34.3 gm/dL  Auto Neutrophil # : 3.3 K/uL  Auto Lymphocyte # : 1.3 K/uL  Auto Monocyte # : 1.0 K/uL  Auto Eosinophil # : 0.1 K/uL  Auto Basophil # : 0.0 K/uL  Auto Neutrophil % : 57.2 %  Auto Lymphocyte % : 22.9 %  Auto Monocyte % : 17.0 %  Auto Eosinophil % : 2.4 %  Auto Basophil % : 0.5 %    02-11    126<L>  |  86<L>  |  63<H>  ----------------------------<  159<H>  3.7   |  29  |  1.76<H>    Ca    9.4      11 Feb 2018 15:01  Phos  3.8     02-11  Mg     3.2     02-11    TPro  6.4  /  Alb  3.3  /  TBili  0.3  /  DBili  x   /  AST  33  /  ALT  70<H>  /  AlkPhos  147<H>  02-11      proBNP:   Lipid Profile:   HgA1c:   TSH:       CARDIAC MARKERS:      Troponin T, Serum (02.11.18 @ 15:01)    Troponin T, Serum: 0.02 ng/mL    TELEMETRY: 	    ECG:  	  RADIOLOGY:  OTHER: 	    PREVIOUS DIAGNOSTIC TESTING:      Echocardiogram:    < from: Transthoracic Echocardiogram (01.05.18 @ 11:32) >  Conclusions:  Technically difficult study.  1. Mitral annular calcification and calcified mitral  leaflets with decreased diastolic opening. Minimal mitral  regurgitation. Meantransmitral valve gradient equals 5 mm  Hg, consistent with moderate mitral stenosis. (HRabout 80  bpm)  2. Aortic valve not well visualized; appears to be a  calcified trileaflet valve with decreased opening. Peak  transaortic valve gradient equals 36 mm Hg, mean  transaortic valve gradient equals 23 mm Hg, estimated  aortic valve area equals 1.2 sqcm (by continuity equation),  aortic valve velocity time integral equals 59 cm,  consistent with moderate aortic stenosis. Consider  additional imaging of the aortic valve such as with  additional TTE or JOY imaging if clinically indicated. No  aortic valve regurgitation seen.  3. Endocardium not well visualized; grossly hyperdynamic  left ventricular systolic function. EF approximately 75% by  visual estimation.  4. The right ventricle is not well visualized; grossly  normal right ventricular systolic function.  *** No previous Echo exam.  	  	  ASSESSMENT/PLAN: 	    1) Bradycardia - stable, appears to be chronic, likely A-Fib with SVR currently, do not suspect that this is directly related to patient's AMS   - hold AVN Blockers  - monitor on Tele  - no indication for emergent pacing at this time  - hold AC in setting of SDH CHIEF COMPLAINT: AMS    HISTORY OF PRESENT ILLNESS: The Pt is an 88 y/o woman with h/o       Allergies    No Known Allergies    Intolerances    	    MEDICATIONS:  furosemide   Injectable 20 milliGRAM(s) IV Push once    cefTRIAXone   IVPB 1 Gram(s) IV Intermittent every 24 hours      lacosamide IVPB 150 milliGRAM(s) IV Intermittent every 24 hours  levETIRAcetam  IVPB 500 milliGRAM(s) IV Intermittent every 12 hours            PAST MEDICAL & SURGICAL HISTORY:  SDH (subdural hematoma)  Cervical cancer: Stage I per the daughter s/p hysterectomy april 2000 (complicated by: adhesions / pna)  Endocarditis: 2010 (treated w/ antibiotics in [facility])  Hypertension  Diabetes mellitus  Atrial fibrillation: on aspirin (last taken 1/2/2018)  H/O: hysterectomy  History of tracheostomy: april 2000 as a complication from her hysterectomy - developed pna and was unable to be weaned off the vent      FAMILY HISTORY:  No pertinent family history in first degree relatives      SOCIAL HISTORY:    [ ] Non-smoker  [ ] Smoker  [ ] Alcohol      REVIEW OF SYSTEMS: Unable to obtain due to AMS.     PHYSICAL EXAM:  T(C): 37.2 (02-11-18 @ 14:52), Max: 37.2 (02-11-18 @ 14:52)  HR: 40 (02-11-18 @ 16:30) (35 - 40)  BP: 158/58 (02-11-18 @ 16:30) (123/72 - 158/58)  RR: 21 (02-11-18 @ 16:57) (18 - 22)  SpO2: 93% (02-11-18 @ 16:57) (88% - 100%)  Wt(kg): --  I&O's Summary      Appearance: Normal	  HEENT:   Normal oral mucosa, PERRL, EOMI	  Lymphatic: No lymphadenopathy  Cardiovascular: Normal S1 S2, No JVD, No murmurs, No edema  Respiratory: Lungs clear to auscultation	  Psychiatry: A & O x 3, Mood & affect appropriate  Gastrointestinal:  Soft, Non-tender, + BS	  Skin: No rashes, No ecchymoses, No cyanosis	  Neurologic: Non-focal  Extremities: Normal range of motion, No clubbing, cyanosis or edema  Vascular: Peripheral pulses palpable 2+ bilaterally        LABS:	 	    CBC Full  -  ( 11 Feb 2018 15:01 )  WBC Count : 5.8 K/uL  Hemoglobin : 10.4 g/dL  Hematocrit : 30.3 %  Platelet Count - Automated : 269 K/uL  Mean Cell Volume : 92.4 fl  Mean Cell Hemoglobin : 31.8 pg  Mean Cell Hemoglobin Concentration : 34.3 gm/dL  Auto Neutrophil # : 3.3 K/uL  Auto Lymphocyte # : 1.3 K/uL  Auto Monocyte # : 1.0 K/uL  Auto Eosinophil # : 0.1 K/uL  Auto Basophil # : 0.0 K/uL  Auto Neutrophil % : 57.2 %  Auto Lymphocyte % : 22.9 %  Auto Monocyte % : 17.0 %  Auto Eosinophil % : 2.4 %  Auto Basophil % : 0.5 %    02-11    126<L>  |  86<L>  |  63<H>  ----------------------------<  159<H>  3.7   |  29  |  1.76<H>    Ca    9.4      11 Feb 2018 15:01  Phos  3.8     02-11  Mg     3.2     02-11    TPro  6.4  /  Alb  3.3  /  TBili  0.3  /  DBili  x   /  AST  33  /  ALT  70<H>  /  AlkPhos  147<H>  02-11      proBNP:   Lipid Profile:   HgA1c:   TSH:       CARDIAC MARKERS:      Troponin T, Serum (02.11.18 @ 15:01)    Troponin T, Serum: 0.02 ng/mL    TELEMETRY: 	    ECG: A-Fib with SVR 	  RADIOLOGY:  OTHER: 	    PREVIOUS DIAGNOSTIC TESTING:      Echocardiogram:    < from: Transthoracic Echocardiogram (01.05.18 @ 11:32) >  Conclusions:  Technically difficult study.  1. Mitral annular calcification and calcified mitral  leaflets with decreased diastolic opening. Minimal mitral  regurgitation. Meantransmitral valve gradient equals 5 mm  Hg, consistent with moderate mitral stenosis. (HRabout 80  bpm)  2. Aortic valve not well visualized; appears to be a  calcified trileaflet valve with decreased opening. Peak  transaortic valve gradient equals 36 mm Hg, mean  transaortic valve gradient equals 23 mm Hg, estimated  aortic valve area equals 1.2 sqcm (by continuity equation),  aortic valve velocity time integral equals 59 cm,  consistent with moderate aortic stenosis. Consider  additional imaging of the aortic valve such as with  additional TTE or JOY imaging if clinically indicated. No  aortic valve regurgitation seen.  3. Endocardium not well visualized; grossly hyperdynamic  left ventricular systolic function. EF approximately 75% by  visual estimation.  4. The right ventricle is not well visualized; grossly  normal right ventricular systolic function.  *** No previous Echo exam.  	  	  ASSESSMENT/PLAN: 	    1) Bradycardia - stable, appears to be chronic, likely A-Fib with SVR currently, do not suspect that this is directly related to patient's AMS   - hold AVN Blockers  - monitor on Tele  - no indication for emergent pacing at this time  - hold AC in setting of SDH CHIEF COMPLAINT: AMS    HISTORY OF PRESENT ILLNESS: The Pt is an 88 y/o woman with h/o Bradycardia, A-Fib (not on AC), HTN, DM, SDH who presented to the ED with Fall and AMS. Patient's family described fall as sliding from a chair with unclear LOC. Imaging in the ED revealed SDH, with potentially worsened severity compared to prior, but this remains unclear. Patient also noted to be bradycardic in the ED with HR in the 30s - 50s. Of note, she was previously evaluated for similar bradycardia several times in the past 1 - 2 months. Most recently, her BB was stopped on DC (2/2/2018), though patient and family at bedside were unable to confirm her medication list at the time of interview.        Allergies    No Known Allergies    Intolerances    	    MEDICATIONS:  furosemide   Injectable 20 milliGRAM(s) IV Push once    cefTRIAXone   IVPB 1 Gram(s) IV Intermittent every 24 hours      lacosamide IVPB 150 milliGRAM(s) IV Intermittent every 24 hours  levETIRAcetam  IVPB 500 milliGRAM(s) IV Intermittent every 12 hours            PAST MEDICAL & SURGICAL HISTORY:  SDH (subdural hematoma)  Cervical cancer: Stage I per the daughter s/p hysterectomy april 2000 (complicated by: adhesions / pna)  Endocarditis: 2010 (treated w/ antibiotics in [facility]  Hypertension  Diabetes mellitus  Atrial fibrillation: on aspirin (last taken 1/2/2018)  H/O: hysterectomy  History of tracheostomy: april 2000 as a complication from her hysterectomy - developed pna and was unable to be weaned off the vent      FAMILY HISTORY:  No pertinent family history in first degree relatives      SOCIAL HISTORY: Living in [facility]      REVIEW OF SYSTEMS: Unable to obtain due to AMS.     PHYSICAL EXAM:  T(C): 37.2 (02-11-18 @ 14:52), Max: 37.2 (02-11-18 @ 14:52)  HR: 40 (02-11-18 @ 16:30) (35 - 40)  BP: 158/58 (02-11-18 @ 16:30) (123/72 - 158/58)  RR: 21 (02-11-18 @ 16:57) (18 - 22)  SpO2: 93% (02-11-18 @ 16:57) (88% - 100%)  Wt(kg): --  I&O's Summary      Appearance: Lethargic	  HEENT:  EOMI	  Cardiovascular: irregularly irregular, silvio  Respiratory: Lungs clear to auscultation anteriorly	  Psychiatry: Mood & affect appropriate  Gastrointestinal:  Soft	  Skin: No cyanosis	  Neurologic: Non-focal  Extremities: 1 - 2+ LE pitting edema b/l    LABS:	 	    CBC Full  -  ( 11 Feb 2018 15:01 )  WBC Count : 5.8 K/uL  Hemoglobin : 10.4 g/dL  Hematocrit : 30.3 %  Platelet Count - Automated : 269 K/uL  Mean Cell Volume : 92.4 fl  Mean Cell Hemoglobin : 31.8 pg  Mean Cell Hemoglobin Concentration : 34.3 gm/dL  Auto Neutrophil # : 3.3 K/uL  Auto Lymphocyte # : 1.3 K/uL  Auto Monocyte # : 1.0 K/uL  Auto Eosinophil # : 0.1 K/uL  Auto Basophil # : 0.0 K/uL  Auto Neutrophil % : 57.2 %  Auto Lymphocyte % : 22.9 %  Auto Monocyte % : 17.0 %  Auto Eosinophil % : 2.4 %  Auto Basophil % : 0.5 %    02-11    126<L>  |  86<L>  |  63<H>  ----------------------------<  159<H>  3.7   |  29  |  1.76<H>    Ca    9.4      11 Feb 2018 15:01  Phos  3.8     02-11  Mg     3.2     02-11    TPro  6.4  /  Alb  3.3  /  TBili  0.3  /  DBili  x   /  AST  33  /  ALT  70<H>  /  AlkPhos  147<H>  02-11      CARDIAC MARKERS:      Troponin T, Serum (02.11.18 @ 15:01)    Troponin T, Serum: 0.02 ng/mL    TELEMETRY: A-Fib in 30s - 50s	    ECG: A-Fib with SVR 	    PREVIOUS DIAGNOSTIC TESTING:      Echocardiogram:    < from: Transthoracic Echocardiogram (01.05.18 @ 11:32) >  Conclusions:  Technically difficult study.  1. Mitral annular calcification and calcified mitral  leaflets with decreased diastolic opening. Minimal mitral  regurgitation. Meantransmitral valve gradient equals 5 mm  Hg, consistent with moderate mitral stenosis. (HRabout 80  bpm)  2. Aortic valve not well visualized; appears to be a  calcified trileaflet valve with decreased opening. Peak  transaortic valve gradient equals 36 mm Hg, mean  transaortic valve gradient equals 23 mm Hg, estimated  aortic valve area equals 1.2 sqcm (by continuity equation),  aortic valve velocity time integral equals 59 cm,  consistent with moderate aortic stenosis. Consider  additional imaging of the aortic valve such as with  additional TTE or JOY imaging if clinically indicated. No  aortic valve regurgitation seen.  3. Endocardium not well visualized; grossly hyperdynamic  left ventricular systolic function. EF approximately 75% by  visual estimation.  4. The right ventricle is not well visualized; grossly  normal right ventricular systolic function.  *** No previous Echo exam.  	  	  ASSESSMENT/PLAN: 	    1) Bradycardia - stable, appears to be chronic, likely A-Fib with SVR currently, do not suspect that this is directly related to patient's AMS   - hold AVN Blockers  - monitor on Tele  - no indication for emergent pacing at this time  - hold AC in setting of SDH

## 2018-02-11 NOTE — H&P ADULT - NSHPLABSRESULTS_GEN_ALL_CORE
10.4   5.8   )-----------( 269      ( 2018 15:01 )             30.3     02-    126<L>  |  86<L>  |  63<H>  ----------------------------<  159<H>  3.7   |  29  |  1.76<H>    Ca    9.4      2018 15:01  Phos  3.8       Mg     3.2         TPro  6.4  /  Alb  3.3  /  TBili  0.3  /  DBili  x   /  AST  33  /  ALT  70<H>  /  AlkPhos  147<H>      Urinalysis Basic - ( 2018 15:02 )    Color: Yellow / Appearance: SL Turbid / S.014 / pH: x  Gluc: x / Ketone: Negative  / Bili: Negative / Urobili: Negative   Blood: x / Protein: Trace / Nitrite: Negative   Leuk Esterase: Large / RBC: 0-2 /HPF / WBC 11-25 /HPF   Sq Epi: x / Non Sq Epi: OCC /HPF / Bacteria: Few /HPF    < from: CT Head No Cont (18 @ 15:35) >    FINDINGS:  Head CT:  Multiple images are limited by patient motion.    Left parietal craniotomy is evident and unchanged. A subjacent left   holoconvexity  low density subdural collection is again identified with   small amounts of hyperdensity seen layering posteriorly. This   hyperdensity appears slightly increased in comparison to prior imaging.   However, allowing for differences in technique and patient positioning,   the collection is slightly decreased from 1.0 cm to 0.8 cm.    Prominence of the ventricles and sulci is otherwise consistent with   age-appropriate line loss. Hemispheric white matter lucencies are   consistent with moderate severity microvascular ischemic change.    The calvarium is intact. The visualized intraorbital compartment,   paranasal sinuses and tympanomastoid cavities appear free of acute   disease.      Cervical spine CT:  There is patient motion at C1-C2, limiting evaluation for a subtle   nondisplaced fracture. Otherwise, alignment is maintained. Vertebral   bodies are normal in height, without evidence of fracture or dislocation.   Prevertebral soft tissues are within normal limits without soft tissue   swelling or hematoma.    There is mild multilevel degenerative spondylosis.    Evaluation of the soft tissues demonstrates mild bilateral   atherosclerotic disease at the carotid bifurcations. Heterogeneity of the   thyroid gland is noted. Please clinically correlate and consider   dedicated additional imaging for further evaluation if clinically   indicated.    The visualized lung apices are within normal limits.    IMPRESSION:  No acute intracranial hemorrhage. Slight interval increased hyperdensity   within left holoconvexity subdural collection which may represent   interval small increased subdural hemorrhage.  The collection overall   however has slightly decreased in size.    Evaluation for subtle nondisplaced fracture at C1-C2 is limited by   patient motion however, there is otherwise no evidence for fracture or   dislocation.    These findings were discussed with Dr. Pablo at 2018 3:51 PM by Dr. Ibrahim.     < end of copied text >

## 2018-02-11 NOTE — H&P ADULT - PROBLEM SELECTOR PLAN 3
- fall at nursing facility this morning with no reported loss of conciousness, as per daughter, has remained alert and oriented as per baseline  - CTH not revealing of acute ICH. Seen by NSGY with recommendation for repeat scan in 6 hours   - q4 neuro checks, fall precautions

## 2018-02-11 NOTE — ED ADULT NURSE REASSESSMENT NOTE - NS ED NURSE REASSESS COMMENT FT1
Patient is resting comfortably. Asking for bedpan. Ancillary staff notified. Family at bedside. Patient is Alert and Oriented x3. Knows name. , year, and that she is in the hospital. She was unsure which hospital. Per MD patient to go to CT on tele box.
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
## 2018-02-12 LAB
ALBUMIN SERPL ELPH-MCNC: 3.4 G/DL — SIGNIFICANT CHANGE UP (ref 3.3–5)
ALP SERPL-CCNC: 150 U/L — HIGH (ref 40–120)
ALT FLD-CCNC: 67 U/L — HIGH (ref 10–45)
ANION GAP SERPL CALC-SCNC: 14 MMOL/L — SIGNIFICANT CHANGE UP (ref 5–17)
AST SERPL-CCNC: 33 U/L — SIGNIFICANT CHANGE UP (ref 10–40)
BILIRUB SERPL-MCNC: 0.4 MG/DL — SIGNIFICANT CHANGE UP (ref 0.2–1.2)
BUN SERPL-MCNC: 51 MG/DL — HIGH (ref 7–23)
CALCIUM SERPL-MCNC: 9.6 MG/DL — SIGNIFICANT CHANGE UP (ref 8.4–10.5)
CHLORIDE SERPL-SCNC: 91 MMOL/L — LOW (ref 96–108)
CO2 SERPL-SCNC: 25 MMOL/L — SIGNIFICANT CHANGE UP (ref 22–31)
CREAT SERPL-MCNC: 1.19 MG/DL — SIGNIFICANT CHANGE UP (ref 0.5–1.3)
GLUCOSE BLDC GLUCOMTR-MCNC: 196 MG/DL — HIGH (ref 70–99)
GLUCOSE BLDC GLUCOMTR-MCNC: 200 MG/DL — HIGH (ref 70–99)
GLUCOSE BLDC GLUCOMTR-MCNC: 222 MG/DL — HIGH (ref 70–99)
GLUCOSE BLDC GLUCOMTR-MCNC: 303 MG/DL — HIGH (ref 70–99)
GLUCOSE BLDC GLUCOMTR-MCNC: 330 MG/DL — HIGH (ref 70–99)
GLUCOSE SERPL-MCNC: 214 MG/DL — HIGH (ref 70–99)
HCT VFR BLD CALC: 31.8 % — LOW (ref 34.5–45)
HGB BLD-MCNC: 10.6 G/DL — LOW (ref 11.5–15.5)
INR BLD: 1.02 RATIO — SIGNIFICANT CHANGE UP (ref 0.88–1.16)
MAGNESIUM SERPL-MCNC: 2.8 MG/DL — HIGH (ref 1.6–2.6)
MCHC RBC-ENTMCNC: 30.2 PG — SIGNIFICANT CHANGE UP (ref 27–34)
MCHC RBC-ENTMCNC: 33.3 GM/DL — SIGNIFICANT CHANGE UP (ref 32–36)
MCV RBC AUTO: 90.6 FL — SIGNIFICANT CHANGE UP (ref 80–100)
PHOSPHATE SERPL-MCNC: 3.3 MG/DL — SIGNIFICANT CHANGE UP (ref 2.5–4.5)
PLATELET # BLD AUTO: 256 K/UL — SIGNIFICANT CHANGE UP (ref 150–400)
POTASSIUM SERPL-MCNC: 3.9 MMOL/L — SIGNIFICANT CHANGE UP (ref 3.5–5.3)
POTASSIUM SERPL-SCNC: 3.9 MMOL/L — SIGNIFICANT CHANGE UP (ref 3.5–5.3)
PROT SERPL-MCNC: 7 G/DL — SIGNIFICANT CHANGE UP (ref 6–8.3)
PROTHROM AB SERPL-ACNC: 11.5 SEC — SIGNIFICANT CHANGE UP (ref 10–13.1)
RBC # BLD: 3.51 M/UL — LOW (ref 3.8–5.2)
RBC # FLD: 18.3 % — HIGH (ref 10.3–14.5)
SODIUM SERPL-SCNC: 130 MMOL/L — LOW (ref 135–145)
WBC # BLD: 5.5 K/UL — SIGNIFICANT CHANGE UP (ref 3.8–10.5)
WBC # FLD AUTO: 5.5 K/UL — SIGNIFICANT CHANGE UP (ref 3.8–10.5)

## 2018-02-12 PROCEDURE — 99233 SBSQ HOSP IP/OBS HIGH 50: CPT | Mod: GC

## 2018-02-12 PROCEDURE — 99233 SBSQ HOSP IP/OBS HIGH 50: CPT

## 2018-02-12 RX ORDER — INSULIN LISPRO 100/ML
VIAL (ML) SUBCUTANEOUS
Qty: 0 | Refills: 0 | Status: DISCONTINUED | OUTPATIENT
Start: 2018-02-12 | End: 2018-02-12

## 2018-02-12 RX ORDER — FUROSEMIDE 40 MG
40 TABLET ORAL ONCE
Qty: 0 | Refills: 0 | Status: COMPLETED | OUTPATIENT
Start: 2018-02-12 | End: 2018-02-12

## 2018-02-12 RX ORDER — DEXTROSE 50 % IN WATER 50 %
25 SYRINGE (ML) INTRAVENOUS ONCE
Qty: 0 | Refills: 0 | Status: DISCONTINUED | OUTPATIENT
Start: 2018-02-12 | End: 2018-02-27

## 2018-02-12 RX ORDER — DEXTROSE 50 % IN WATER 50 %
12.5 SYRINGE (ML) INTRAVENOUS ONCE
Qty: 0 | Refills: 0 | Status: DISCONTINUED | OUTPATIENT
Start: 2018-02-12 | End: 2018-02-27

## 2018-02-12 RX ORDER — DEXTROSE 50 % IN WATER 50 %
1 SYRINGE (ML) INTRAVENOUS ONCE
Qty: 0 | Refills: 0 | Status: DISCONTINUED | OUTPATIENT
Start: 2018-02-12 | End: 2018-02-27

## 2018-02-12 RX ORDER — INSULIN LISPRO 100/ML
VIAL (ML) SUBCUTANEOUS AT BEDTIME
Qty: 0 | Refills: 0 | Status: DISCONTINUED | OUTPATIENT
Start: 2018-02-12 | End: 2018-02-27

## 2018-02-12 RX ORDER — ONDANSETRON 8 MG/1
4 TABLET, FILM COATED ORAL ONCE
Qty: 0 | Refills: 0 | Status: COMPLETED | OUTPATIENT
Start: 2018-02-12 | End: 2018-02-12

## 2018-02-12 RX ORDER — INSULIN LISPRO 100/ML
VIAL (ML) SUBCUTANEOUS
Qty: 0 | Refills: 0 | Status: DISCONTINUED | OUTPATIENT
Start: 2018-02-12 | End: 2018-02-27

## 2018-02-12 RX ORDER — GLUCAGON INJECTION, SOLUTION 0.5 MG/.1ML
1 INJECTION, SOLUTION SUBCUTANEOUS ONCE
Qty: 0 | Refills: 0 | Status: DISCONTINUED | OUTPATIENT
Start: 2018-02-12 | End: 2018-02-27

## 2018-02-12 RX ORDER — ACETAMINOPHEN 500 MG
650 TABLET ORAL EVERY 6 HOURS
Qty: 0 | Refills: 0 | Status: DISCONTINUED | OUTPATIENT
Start: 2018-02-12 | End: 2018-02-27

## 2018-02-12 RX ORDER — ACETAMINOPHEN 500 MG
1000 TABLET ORAL ONCE
Qty: 0 | Refills: 0 | Status: COMPLETED | OUTPATIENT
Start: 2018-02-12 | End: 2018-02-12

## 2018-02-12 RX ORDER — SODIUM CHLORIDE 9 MG/ML
1000 INJECTION, SOLUTION INTRAVENOUS
Qty: 0 | Refills: 0 | Status: DISCONTINUED | OUTPATIENT
Start: 2018-02-12 | End: 2018-02-27

## 2018-02-12 RX ADMIN — LEVETIRACETAM 400 MILLIGRAM(S): 250 TABLET, FILM COATED ORAL at 20:24

## 2018-02-12 RX ADMIN — Medication 40 MILLIGRAM(S): at 17:38

## 2018-02-12 RX ADMIN — Medication 1000 MILLIGRAM(S): at 09:20

## 2018-02-12 RX ADMIN — LACOSAMIDE 130 MILLIGRAM(S): 50 TABLET ORAL at 08:34

## 2018-02-12 RX ADMIN — Medication 400 MILLIGRAM(S): at 08:14

## 2018-02-12 RX ADMIN — Medication 20 MILLIGRAM(S): at 02:42

## 2018-02-12 RX ADMIN — CEFTRIAXONE 100 GRAM(S): 500 INJECTION, POWDER, FOR SOLUTION INTRAMUSCULAR; INTRAVENOUS at 17:37

## 2018-02-12 RX ADMIN — Medication 8: at 17:37

## 2018-02-12 RX ADMIN — LEVETIRACETAM 400 MILLIGRAM(S): 250 TABLET, FILM COATED ORAL at 06:47

## 2018-02-12 RX ADMIN — ONDANSETRON 4 MILLIGRAM(S): 8 TABLET, FILM COATED ORAL at 08:14

## 2018-02-12 NOTE — PROGRESS NOTE ADULT - ASSESSMENT
89F with h/o Bradycardia, A-Fib (not on AC), HTN, DM, SDH who presented to the ED with Fall and AMS with bloodwork significant for hyponatremia.    AMS likely 2/2 metabolic disturbances. MS reportedly improved with improvement in Na.Urinalysis + on ceftriaxone. Afib with slow VR remains unchanged and is chronic. Pt is not hypotensive. Continue to hold AVN blockers.

## 2018-02-12 NOTE — PROGRESS NOTE ADULT - PROBLEM SELECTOR PLAN 5
- Creatinine improving 1.19<--1.76, unclear if it is fluids that helped or the lasix   - will repeat BMP in the morning, assess PO intake for 24 hours and monitor volume status   - renally dose meds  - avoid nephrotoxic agents

## 2018-02-12 NOTE — PROGRESS NOTE ADULT - SUBJECTIVE AND OBJECTIVE BOX
[facility] Progress Note  Consult Spectra [id number]    Interval Events:      MEDICATIONS:    cefTRIAXone   IVPB 1 Gram(s) IV Intermittent every 24 hours      acetaminophen   Tablet. 650 milliGRAM(s) Oral every 6 hours PRN  lacosamide IVPB 150 milliGRAM(s) IV Intermittent every 24 hours  levETIRAcetam  IVPB 500 milliGRAM(s) IV Intermittent every 12 hours      dextrose 50% Injectable 12.5 Gram(s) IV Push once  dextrose 50% Injectable 25 Gram(s) IV Push once  dextrose 50% Injectable 25 Gram(s) IV Push once  dextrose Gel 1 Dose(s) Oral once PRN  glucagon  Injectable 1 milliGRAM(s) IntraMuscular once PRN  insulin lispro (HumaLOG) corrective regimen sliding scale   SubCutaneous three times a day before meals    dextrose 5%. 1000 milliLiter(s) IV Continuous <Continuous>        PHYSICAL EXAM:  T(C): 36.6 (02-12-18 @ 07:15), Max: 36.8 (02-12-18 @ 02:45)  HR: 45 (02-12-18 @ 09:27) (40 - 60)  BP: 142/63 (02-12-18 @ 09:27) (129/89 - 158/58)  RR: 17 (02-12-18 @ 09:27) (17 - 22)  SpO2: 99% (02-12-18 @ 09:27) (88% - 100%)  Wt(kg): --  I&O's Summary    Daily     Daily     Appearance: Normal	  HEENT:   Normal oral mucosa, PERRL, EOMI	  Lymphatic: No lymphadenopathy  Cardiovascular: Normal S1 S2, No JVD, No murmurs, No edema  Respiratory: Lungs clear to auscultation	  Psychiatry: A & O x 3, Mood & affect appropriate  Gastrointestinal:  soft nt nd, normoactive bs  Skin: No rashes, No ecchymoses, No cyanosis	  Neurologic: grossly nonfocal  Extremities: Normal range of motion, No clubbing, cyanosis  Vascular: Peripheral pulses palpable 2+ bilaterally    LABS:	 	    CBC Full  -  ( 12 Feb 2018 07:33 )  WBC Count : 5.50 K/uL  Hemoglobin : 10.6 g/dL  Hematocrit : 31.8 %  Platelet Count - Automated : 256 K/uL  Mean Cell Volume : 90.6 fl  Mean Cell Hemoglobin : 30.2 pg  Mean Cell Hemoglobin Concentration : 33.3 gm/dL  Auto Neutrophil # : x  Auto Lymphocyte # : x  Auto Monocyte # : x  Auto Eosinophil # : x  Auto Basophil # : x  Auto Neutrophil % : x  Auto Lymphocyte % : x  Auto Monocyte % : x  Auto Eosinophil % : x  Auto Basophil % : x    02-12    130<L>  |  91<L>  |  51<H>  ----------------------------<  214<H>  3.9   |  25  |  1.19  02-11    126<L>  |  86<L>  |  63<H>  ----------------------------<  159<H>  3.7   |  29  |  1.76<H>    Ca    9.6      12 Feb 2018 07:35  Ca    9.4      11 Feb 2018 15:01  Phos  3.3     02-12  Phos  3.8     02-11  Mg     2.8     02-12  Mg     3.2     02-11    TPro  7.0  /  Alb  3.4  /  TBili  0.4  /  DBili  x   /  AST  33  /  ALT  67<H>  /  AlkPhos  150<H>  02-12  TPro  6.4  /  Alb  3.3  /  TBili  0.3  /  DBili  x   /  AST  33  /  ALT  70<H>  /  AlkPhos  147<H>  02-11      proBNP:   Lipid Profile:   HgA1c: Hemoglobin A1C, Whole Blood: 6.2 % (01-04 @ 07:22)    TSH: Thyroid Stimulating Hormone, Serum: 2.51 uIU/mL (02-11 @ 21:35)      CARDIAC MARKERS:  Troponin T, Serum: 0.02 ng/mL (02-11 @ 15:01)            TELEMETRY: 	    ECG:  	  RADIOLOGY:  OTHER: 	    PREVIOUS DIAGNOSTIC TESTING:    [ ] Echocardiogram:  [ ] Catheterization:  [ ] Stress Test: Electrophysiology Progress Note  Consult Spectra [id number]    Interval Events:  Pt reportedly has an improved mental status as per daughter at bedside. HR remains the same.    MEDICATIONS:    cefTRIAXone   IVPB 1 Gram(s) IV Intermittent every 24 hours  acetaminophen   Tablet. 650 milliGRAM(s) Oral every 6 hours PRN  lacosamide IVPB 150 milliGRAM(s) IV Intermittent every 24 hours  levETIRAcetam  IVPB 500 milliGRAM(s) IV Intermittent every 12 hours  glucagon  Injectable 1 milliGRAM(s) IntraMuscular once PRN  insulin lispro (HumaLOG) corrective regimen sliding scale   SubCutaneous three times a day before meals    PHYSICAL EXAM:  T(C): 36.6 (02-12-18 @ 07:15), Max: 36.8 (02-12-18 @ 02:45)  HR: 45 (02-12-18 @ 09:27) (40 - 60)  BP: 142/63 (02-12-18 @ 09:27) (129/89 - 158/58)  RR: 17 (02-12-18 @ 09:27) (17 - 22)  SpO2: 99% (02-12-18 @ 09:27) (88% - 100%)  I&O's Summary    Daily     Daily     Appearance: No distress  Cardiovascular: Normal S1 S2, systolic murmur present, irr irr, bradycardic, 2+ LE edema  Respiratory: Lungs grossly clear to auscultation	  Psychiatry: pt is somnolent but arousable  Gastrointestinal:  soft nt  Skin: No cyanosis	  Neurologic: unable to assess  Extremities: LE edema present    LABS:	 	  CBC Full  -  ( 12 Feb 2018 07:33 )  WBC Count : 5.50 K/uL  Hemoglobin : 10.6 g/dL  Hematocrit : 31.8 %  Platelet Count - Automated : 256 K/uL    02-12  130<L>  |  91<L>  |  51<H>  ----------------------------<  214<H>  3.9   |  25  |  1.19    02-11  126<L>  |  86<L>  |  63<H>  ----------------------------<  159<H>  3.7   |  29  |  1.76<H>    Ca    9.6      12 Feb 2018 07:35  Ca    9.4      11 Feb 2018 15:01  Phos  3.3     02-12  Phos  3.8     02-11  Mg     2.8     02-12  Mg     3.2     02-11    TPro  7.0  /  Alb  3.4  /  TBili  0.4  /  DBili  x   /  AST  33  /  ALT  67<H>  /  AlkPhos  150<H>  02-12  TPro  6.4  /  Alb  3.3  /  TBili  0.3  /  DBili  x   /  AST  33  /  ALT  70<H>  /  AlkPhos  147<H>  02-11    HgA1c: Hemoglobin A1C, Whole Blood: 6.2 % (01-04 @ 07:22)    TSH: Thyroid Stimulating Hormone, Serum: 2.51 uIU/mL (02-11 @ 21:35)    CARDIAC MARKERS:  Troponin T, Serum: 0.02 ng/mL (02-11 @ 15:01)    TELEMETRY: afib with HR 40s

## 2018-02-12 NOTE — PROGRESS NOTE ADULT - PROBLEM SELECTOR PLAN 1
- encephalopathy improving, likely metabolic in origin with sodium improvement   - s/p fall with CTH not showing any evidence of acute ICH, and with decreased overall left subdural collection and repeat imaging not showing any worsening of the SDH  - C/W CTX for UA findings

## 2018-02-12 NOTE — PROGRESS NOTE ADULT - PROBLEM SELECTOR PLAN 8
- As per conversation with both daughters - - patient is FULL CODE  - DVT PPx: SCDs     Dispo: patient is clinically improving with better mentation. Etiology of encephalopathy likely 2/2 hyponatremia. Likely 2/2 SIADH in the setting of nausea and pain x 24 hours. LIkely volume overloaded but at the same time possibly having poor forward flow with bradycardia. Pending urine studies collection.      Anneliese Robles  PGY1  [phone]

## 2018-02-12 NOTE — ED ADULT NURSE REASSESSMENT NOTE - NS ED NURSE REASSESS COMMENT FT1
Pharmacy called for medication that is not available on the unit. Spoke with pharmacist Christian states the medication will be sent to the area as soon as it is available. pending medication at this time. Awaiting Vimpat.

## 2018-02-12 NOTE — PROGRESS NOTE ADULT - PROBLEM SELECTOR PLAN 3
- fall at nursing facility this morning with no reported loss of conciousness, as per daughter, has remained alert and oriented as per baseline  - CTH performed x 2, no evidence of worsening of the left subdural collection. No intervention planned by NSGY   - q4 neuro checks, fall precautions

## 2018-02-12 NOTE — ED ADULT NURSE REASSESSMENT NOTE - NS ED NURSE REASSESS COMMENT FT1
received report from Andrey. pt is on cardiac monitor and pads applied to pt for bradycardia. pt has q4 Neuro checks and VS. pt has pain in R hip due to fall. awaiting bed for patient. will cont to monitor VS, labs. received report from Andrey. pt is on cardiac monitor and pads applied to pt for bradycardia. pt has q4 Neuro checks (Neuro check paper in chart) and VS. pt has pain in R hip due to fall. awaiting bed for patient. will cont to monitor VS, labs.

## 2018-02-12 NOTE — PROGRESS NOTE ADULT - SUBJECTIVE AND OBJECTIVE BOX
Patient is a 89y old  Female who presents with a chief complaint of fall and altered mental status (2018 18:54)    SUBJECTIVE / OVERNIGHT EVENTS:  Patient seen at bedside --  no acute events overnight. Patient more alert and awake today, complaining of pain. Still on 2L NC oxygen. No acute issues or concerns.     MEDICATIONS  (STANDING):  cefTRIAXone   IVPB 1 Gram(s) IV Intermittent every 24 hours  dextrose 5%. 1000 milliLiter(s) (50 mL/Hr) IV Continuous <Continuous>  dextrose 50% Injectable 12.5 Gram(s) IV Push once  dextrose 50% Injectable 25 Gram(s) IV Push once  dextrose 50% Injectable 25 Gram(s) IV Push once  insulin lispro (HumaLOG) corrective regimen sliding scale   SubCutaneous three times a day before meals  lacosamide IVPB 150 milliGRAM(s) IV Intermittent every 24 hours  levETIRAcetam  IVPB 500 milliGRAM(s) IV Intermittent every 12 hours    MEDICATIONS  (PRN):  acetaminophen   Tablet. 650 milliGRAM(s) Oral every 6 hours PRN mild to moderate pain  dextrose Gel 1 Dose(s) Oral once PRN Blood Glucose LESS THAN 70 milliGRAM(s)/deciliter  glucagon  Injectable 1 milliGRAM(s) IntraMuscular once PRN Glucose LESS THAN 70 milligrams/deciliter    Vital Signs Last 24 Hrs  T(C): 36.6 (2018 07:15), Max: 37.2 (2018 14:52)  T(F): 97.9 (2018 07:15), Max: 98.9 (2018 14:52)  HR: 45 (2018 09:27) (35 - 60)  BP: 142/63 (2018 09:27) (123/72 - 158/58)  BP(mean): 78 (2018 07:15) (78 - 78)  RR: 17 (2018 09:27) (17 - 22)  SpO2: 99% (2018 09:27) (88% - 100%)    CAPILLARY BLOOD GLUCOSE  POCT Blood Glucose.: 303 mg/dL (2018 13:04)  POCT Blood Glucose.: 222 mg/dL (2018 09:39)  POCT Blood Glucose.: 196 mg/dL (2018 06:12)    I&O's Summary    PHYSICAL EXAM  General: elderly frail woman, lethargic. NAD  HEENT: PERRL, Sclera anicteric   Cardiovascular: Bradycardic with irregular rhythm, RITESH 3/6  No rubs or gallops  Lungs: On 2L NC. Good air flow in upper portions of the lungs with crackles and diminished breath sounds from the middle lung downward. No wheezes or rhonchi   Abdomen: Soft and distended. Bowel sounds present. Nontender to palpation. No organomegaly   Extremities: 2+ pitting edema to the mid shin. No cyanosis and extremities otherwise well perfused.  Neuro: AAO x 2.   Skin: small amount of bruising on the forehead. No skin changes noted otherwise    LABS:                        10.6   5.50  )-----------( 256      ( 2018 07:33 )             31.8     02-12    130<L>  |  91<L>  |  51<H>  ----------------------------<  214<H>  3.9   |  25  |  1.19    Ca    9.6      2018 07:35  Phos  3.3     02-12  Mg     2.8     02-12    TPro  7.0  /  Alb  3.4  /  TBili  0.4  /  DBili  x   /  AST  33  /  ALT  67<H>  /  AlkPhos  150<H>  02-12    PT/INR - ( 2018 07:33 )   PT: 11.5 sec;   INR: 1.02 ratio         PTT - ( 2018 15:01 )  PTT:26.4 sec  CARDIAC MARKERS ( 2018 15:01 )  x     / 0.02 ng/mL / x     / x     / x          Urinalysis Basic - ( 2018 15:02 )    Color: Yellow / Appearance: SL Turbid / S.014 / pH: x  Gluc: x / Ketone: Negative  / Bili: Negative / Urobili: Negative   Blood: x / Protein: Trace / Nitrite: Negative   Leuk Esterase: Large / RBC: 0-2 /HPF / WBC 11-25 /HPF   Sq Epi: x / Non Sq Epi: OCC /HPF / Bacteria: Few /HPF        RADIOLOGY & ADDITIONAL TESTS:    Imaging Personally Reviewed:  Consultant(s) Notes Reviewed:    Care Discussed with Consultants/Other Providers: Patient is a 89y old  Female who presents with a chief complaint of fall and altered mental status (2018 18:54)    SUBJECTIVE / OVERNIGHT EVENTS:  Patient seen at bedside --  no acute events overnight. Patient more alert and awake today, complaining of pain. Still on 2L NC oxygen. No acute issues or concerns.     MEDICATIONS  (STANDING):  cefTRIAXone   IVPB 1 Gram(s) IV Intermittent every 24 hours  dextrose 5%. 1000 milliLiter(s) (50 mL/Hr) IV Continuous <Continuous>  dextrose 50% Injectable 12.5 Gram(s) IV Push once  dextrose 50% Injectable 25 Gram(s) IV Push once  dextrose 50% Injectable 25 Gram(s) IV Push once  insulin lispro (HumaLOG) corrective regimen sliding scale   SubCutaneous three times a day before meals  lacosamide IVPB 150 milliGRAM(s) IV Intermittent every 24 hours  levETIRAcetam  IVPB 500 milliGRAM(s) IV Intermittent every 12 hours    MEDICATIONS  (PRN):  acetaminophen   Tablet. 650 milliGRAM(s) Oral every 6 hours PRN mild to moderate pain  dextrose Gel 1 Dose(s) Oral once PRN Blood Glucose LESS THAN 70 milliGRAM(s)/deciliter  glucagon  Injectable 1 milliGRAM(s) IntraMuscular once PRN Glucose LESS THAN 70 milligrams/deciliter    Vital Signs Last 24 Hrs  T(C): 36.6 (2018 07:15), Max: 37.2 (2018 14:52)  T(F): 97.9 (2018 07:15), Max: 98.9 (2018 14:52)  HR: 45 (2018 09:27) (35 - 60)  BP: 142/63 (2018 09:27) (123/72 - 158/58)  BP(mean): 78 (2018 07:15) (78 - 78)  RR: 17 (2018 09:27) (17 - 22)  SpO2: 99% (2018 09:27) (88% - 100%)    CAPILLARY BLOOD GLUCOSE  POCT Blood Glucose.: 303 mg/dL (2018 13:04)  POCT Blood Glucose.: 222 mg/dL (2018 09:39)  POCT Blood Glucose.: 196 mg/dL (2018 06:12)    I&O's Summary    PHYSICAL EXAM  General: elderly frail woman, lethargic. NAD  HEENT: PERRL, Sclera anicteric   Cardiovascular: Bradycardic with irregular rhythm, RITESH 3/6  No rubs or gallops  Lungs: On 2L NC. Good air flow in upper portions of the lungs with crackles and diminished breath sounds from the middle lung downward. No wheezes or rhonchi   Abdomen: Soft and distended. Bowel sounds present. Nontender to palpation. No organomegaly   Extremities: 2+ pitting edema to the mid shin. No cyanosis and extremities otherwise well perfused.  Neuro: AAO x 2.   Skin: small amount of bruising on the forehead. No skin changes noted otherwise    LABS:                        10.6   5.50  )-----------( 256      ( 2018 07:33 )             31.8     02-12    130<L>  |  91<L>  |  51<H>  ----------------------------<  214<H>  3.9   |  25  |  1.19    Ca    9.6      2018 07:35  Phos  3.3     02-12  Mg     2.8     02-12    TPro  7.0  /  Alb  3.4  /  TBili  0.4  /  DBili  x   /  AST  33  /  ALT  67<H>  /  AlkPhos  150<H>  02-12    PT/INR - ( 2018 07:33 )   PT: 11.5 sec;   INR: 1.02 ratio         PTT - ( 2018 15:01 )  PTT:26.4 sec  CARDIAC MARKERS ( 2018 15:01 )  x     / 0.02 ng/mL / x     / x     / x          Urinalysis Basic - ( 2018 15:02 )    Color: Yellow / Appearance: SL Turbid / S.014 / pH: x  Gluc: x / Ketone: Negative  / Bili: Negative / Urobili: Negative   Blood: x / Protein: Trace / Nitrite: Negative   Leuk Esterase: Large / RBC: 0-2 /HPF / WBC 11-25 /HPF   Sq Epi: x / Non Sq Epi: OCC /HPF / Bacteria: Few /HPF    RADIOLOGY & ADDITIONAL TESTS:  No new imaging     Imaging Personally Reviewed:  Consultant(s) Notes Reviewed:    Care Discussed with Consultants/Other Providers:

## 2018-02-13 LAB
-  AMIKACIN: SIGNIFICANT CHANGE UP
-  AMPICILLIN/SULBACTAM: SIGNIFICANT CHANGE UP
-  AMPICILLIN: SIGNIFICANT CHANGE UP
-  AZTREONAM: SIGNIFICANT CHANGE UP
-  CEFAZOLIN: SIGNIFICANT CHANGE UP
-  CEFEPIME: SIGNIFICANT CHANGE UP
-  CEFOXITIN: SIGNIFICANT CHANGE UP
-  CEFTAZIDIME: SIGNIFICANT CHANGE UP
-  CEFTRIAXONE: SIGNIFICANT CHANGE UP
-  CIPROFLOXACIN: SIGNIFICANT CHANGE UP
-  ERTAPENEM: SIGNIFICANT CHANGE UP
-  GENTAMICIN: SIGNIFICANT CHANGE UP
-  IMIPENEM: SIGNIFICANT CHANGE UP
-  LEVOFLOXACIN: SIGNIFICANT CHANGE UP
-  MEROPENEM: SIGNIFICANT CHANGE UP
-  NITROFURANTOIN: SIGNIFICANT CHANGE UP
-  PIPERACILLIN/TAZOBACTAM: SIGNIFICANT CHANGE UP
-  TOBRAMYCIN: SIGNIFICANT CHANGE UP
-  TRIMETHOPRIM/SULFAMETHOXAZOLE: SIGNIFICANT CHANGE UP
ALBUMIN SERPL ELPH-MCNC: 3 G/DL — LOW (ref 3.3–5)
ALP SERPL-CCNC: 132 U/L — HIGH (ref 40–120)
ALT FLD-CCNC: 46 U/L — HIGH (ref 10–45)
ANION GAP SERPL CALC-SCNC: 10 MMOL/L — SIGNIFICANT CHANGE UP (ref 5–17)
AST SERPL-CCNC: 23 U/L — SIGNIFICANT CHANGE UP (ref 10–40)
BILIRUB SERPL-MCNC: 0.4 MG/DL — SIGNIFICANT CHANGE UP (ref 0.2–1.2)
BUN SERPL-MCNC: 41 MG/DL — HIGH (ref 7–23)
CALCIUM SERPL-MCNC: 9.4 MG/DL — SIGNIFICANT CHANGE UP (ref 8.4–10.5)
CHLORIDE SERPL-SCNC: 98 MMOL/L — SIGNIFICANT CHANGE UP (ref 96–108)
CO2 SERPL-SCNC: 31 MMOL/L — SIGNIFICANT CHANGE UP (ref 22–31)
CREAT SERPL-MCNC: 1.09 MG/DL — SIGNIFICANT CHANGE UP (ref 0.5–1.3)
CULTURE RESULTS: SIGNIFICANT CHANGE UP
GLUCOSE BLDC GLUCOMTR-MCNC: 128 MG/DL — HIGH (ref 70–99)
GLUCOSE BLDC GLUCOMTR-MCNC: 211 MG/DL — HIGH (ref 70–99)
GLUCOSE BLDC GLUCOMTR-MCNC: 213 MG/DL — HIGH (ref 70–99)
GLUCOSE BLDC GLUCOMTR-MCNC: 268 MG/DL — HIGH (ref 70–99)
GLUCOSE SERPL-MCNC: 83 MG/DL — SIGNIFICANT CHANGE UP (ref 70–99)
HCT VFR BLD CALC: 32.5 % — LOW (ref 34.5–45)
HGB BLD-MCNC: 10.3 G/DL — LOW (ref 11.5–15.5)
MAGNESIUM SERPL-MCNC: 2.9 MG/DL — HIGH (ref 1.6–2.6)
MCHC RBC-ENTMCNC: 29.7 PG — SIGNIFICANT CHANGE UP (ref 27–34)
MCHC RBC-ENTMCNC: 31.7 GM/DL — LOW (ref 32–36)
MCV RBC AUTO: 93.7 FL — SIGNIFICANT CHANGE UP (ref 80–100)
METHOD TYPE: SIGNIFICANT CHANGE UP
ORGANISM # SPEC MICROSCOPIC CNT: SIGNIFICANT CHANGE UP
ORGANISM # SPEC MICROSCOPIC CNT: SIGNIFICANT CHANGE UP
PHOSPHATE SERPL-MCNC: 3.2 MG/DL — SIGNIFICANT CHANGE UP (ref 2.5–4.5)
PLATELET # BLD AUTO: 251 K/UL — SIGNIFICANT CHANGE UP (ref 150–400)
POTASSIUM SERPL-MCNC: 4.2 MMOL/L — SIGNIFICANT CHANGE UP (ref 3.5–5.3)
POTASSIUM SERPL-SCNC: 4.2 MMOL/L — SIGNIFICANT CHANGE UP (ref 3.5–5.3)
PROT SERPL-MCNC: 6.5 G/DL — SIGNIFICANT CHANGE UP (ref 6–8.3)
RBC # BLD: 3.47 M/UL — LOW (ref 3.8–5.2)
RBC # FLD: 18.8 % — HIGH (ref 10.3–14.5)
SODIUM SERPL-SCNC: 139 MMOL/L — SIGNIFICANT CHANGE UP (ref 135–145)
SPECIMEN SOURCE: SIGNIFICANT CHANGE UP
WBC # BLD: 4.96 K/UL — SIGNIFICANT CHANGE UP (ref 3.8–10.5)
WBC # FLD AUTO: 4.96 K/UL — SIGNIFICANT CHANGE UP (ref 3.8–10.5)

## 2018-02-13 PROCEDURE — 99233 SBSQ HOSP IP/OBS HIGH 50: CPT | Mod: GC

## 2018-02-13 RX ORDER — FUROSEMIDE 40 MG
40 TABLET ORAL ONCE
Qty: 0 | Refills: 0 | Status: COMPLETED | OUTPATIENT
Start: 2018-02-13 | End: 2018-02-13

## 2018-02-13 RX ADMIN — Medication 2: at 21:48

## 2018-02-13 RX ADMIN — LEVETIRACETAM 400 MILLIGRAM(S): 250 TABLET, FILM COATED ORAL at 08:31

## 2018-02-13 RX ADMIN — CEFTRIAXONE 100 GRAM(S): 500 INJECTION, POWDER, FOR SOLUTION INTRAMUSCULAR; INTRAVENOUS at 17:08

## 2018-02-13 RX ADMIN — Medication 4: at 16:27

## 2018-02-13 RX ADMIN — LACOSAMIDE 130 MILLIGRAM(S): 50 TABLET ORAL at 08:31

## 2018-02-13 RX ADMIN — Medication 40 MILLIGRAM(S): at 13:31

## 2018-02-13 RX ADMIN — Medication 4: at 11:45

## 2018-02-13 RX ADMIN — LEVETIRACETAM 400 MILLIGRAM(S): 250 TABLET, FILM COATED ORAL at 17:07
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
## 2018-02-13 NOTE — PHYSICAL THERAPY INITIAL EVALUATION ADULT - PLANNED THERAPY INTERVENTIONS, PT EVAL
transfer training/bed mobility training/balance training/gait training/postural re-education/strengthening

## 2018-02-13 NOTE — PHYSICAL THERAPY INITIAL EVALUATION ADULT - AMBULATION SKILLS, REHAB EVAL
needed assist/needs device/Pt lives in a private house with dtr and 3 HAROLDO. Prior to recent hospitalizations/rehab, pt was Ind with amb and required assist with ADLs
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
## 2018-02-13 NOTE — PROGRESS NOTE ADULT - PROBLEM SELECTOR PLAN 5
- creatinine continues to trend down 1.09 today <-- 1.19   - renally dose meds  - avoid nephrotoxic agents

## 2018-02-13 NOTE — PHYSICAL THERAPY INITIAL EVALUATION ADULT - CRITERIA FOR SKILLED THERAPEUTIC INTERVENTIONS
rehab potential/impairments found/risk reduction/prevention/functional limitations in following categories

## 2018-02-13 NOTE — PHYSICAL THERAPY INITIAL EVALUATION ADULT - ADDITIONAL COMMENTS
CT head/c-spine: No acute intracranial hemorrhage. Slight interval increased hyperdensity within left holoconvexity subdural collection which may represent interval small increased subdural hemorrhage. The collection overall however has slightly decreased in size. Evaluation for subtle nondisplaced fracture at C1-C2 is limited by patient motion however, there is otherwise no evidence for fracture or dislocation.

## 2018-02-13 NOTE — PROGRESS NOTE ADULT - PROBLEM SELECTOR PLAN 8
- As per conversation with both daughters - - patient is FULL CODE  - DVT PPx: SCDs     Dispo: Encephalopathy much improved with sodium within range and creatinine almost base to baseline. PT eval pending. Continuing D3/ CTX for UTI - -> will transition to keflex on discharge.       Anneliese Robles  PGY1  [phone]

## 2018-02-13 NOTE — PROGRESS NOTE ADULT - PROBLEM SELECTOR PLAN 2
- hx of bradycardia on recent admission with metoprolol discontinued prior to discharge  - remains bradycardic with rates into the 40s -50s, and asymptomatic: mentating well with appropriate pressures    - seen by EP with no emergent planned intervention

## 2018-02-13 NOTE — PROGRESS NOTE ADULT - SUBJECTIVE AND OBJECTIVE BOX
Patient is a 89y old  Female who presents with a chief complaint of fall and altered mental status (2018 18:54)    SUBJECTIVE / OVERNIGHT EVENTS:  Patient seen at bedside this morning - - mentating well and a lot more awake with granddaughter at bedside. Reports wanting to go home.     MEDICATIONS  (STANDING):  cefTRIAXone   IVPB 1 Gram(s) IV Intermittent every 24 hours  dextrose 5%. 1000 milliLiter(s) (50 mL/Hr) IV Continuous <Continuous>  dextrose 50% Injectable 12.5 Gram(s) IV Push once  dextrose 50% Injectable 25 Gram(s) IV Push once  dextrose 50% Injectable 25 Gram(s) IV Push once  insulin lispro (HumaLOG) corrective regimen sliding scale   SubCutaneous three times a day before meals  insulin lispro (HumaLOG) corrective regimen sliding scale   SubCutaneous at bedtime  lacosamide IVPB 150 milliGRAM(s) IV Intermittent every 24 hours  levETIRAcetam  IVPB 500 milliGRAM(s) IV Intermittent every 12 hours    MEDICATIONS  (PRN):  acetaminophen   Tablet. 650 milliGRAM(s) Oral every 6 hours PRN mild to moderate pain  dextrose Gel 1 Dose(s) Oral once PRN Blood Glucose LESS THAN 70 milliGRAM(s)/deciliter  glucagon  Injectable 1 milliGRAM(s) IntraMuscular once PRN Glucose LESS THAN 70 milligrams/deciliter    Vital Signs Last 24 Hrs  T(C): 36.8 (2018 08:20), Max: 36.8 (2018 04:27)  T(F): 98.2 (2018 08:20), Max: 98.3 (2018 04:27)  HR: 51 (2018 08:20) (41 - 51)  BP: 147/73 (2018 08:20) (134/54 - 150/70)  BP(mean): --  RR: 18 (2018 08:20) (17 - 18)  SpO2: 100% (2018 08:20) (99% - 100%)    CAPILLARY BLOOD GLUCOSE  POCT Blood Glucose.: 128 mg/dL (2018 07:36)  POCT Blood Glucose.: 200 mg/dL (2018 21:09)  POCT Blood Glucose.: 330 mg/dL (2018 16:55)  POCT Blood Glucose.: 303 mg/dL (2018 13:04)    I&O's Summary    2018 07:01  -  2018 07:00  --------------------------------------------------------  IN: 150 mL / OUT: 0 mL / NET: 150 mL    2018 07:01  -  2018 09:45  --------------------------------------------------------  IN: 0 mL / OUT: 300 mL / NET: -300 mL    PHYSICAL EXAM  General: elderly frail woman, more awake this morning   HEENT: PERRL, Sclera anicteric   Cardiovascular: Bradycardic with irregular rhythm, RITESH 4/6  No rubs or gallops  Lungs: Off oxygen. Bibasilar crackles appreciated with otherwise good airflow, bilaterally.    Abdomen: Soft and distended. Bowel sounds present. Nontender to palpation. No organomegaly   Extremities: 2+ pitting edema to the mid shin. No cyanosis and extremities otherwise well perfused.  Neuro: AAO x 2.   Skin: small amount of bruising on the forehead. No skin changes noted otherwise    LABS:                        10.3   4.96  )-----------( 251      ( 2018 07:47 )             32.5     02-13    139  |  98  |  41<H>  ----------------------------<  83  4.2   |  31  |  1.09    Ca    9.4      2018 07:33  Phos  3.2     02-13  Mg     2.9     02-13    TPro  6.5  /  Alb  3.0<L>  /  TBili  0.4  /  DBili  x   /  AST  23  /  ALT  46<H>  /  AlkPhos  132<H>  02-13    PT/INR - ( 2018 07:33 )   PT: 11.5 sec;   INR: 1.02 ratio         PTT - ( 2018 15:01 )  PTT:26.4 sec  CARDIAC MARKERS ( 2018 15:01 )  x     / 0.02 ng/mL / x     / x     / x          Urinalysis Basic - ( 2018 15:02 )    Color: Yellow / Appearance: SL Turbid / S.014 / pH: x  Gluc: x / Ketone: Negative  / Bili: Negative / Urobili: Negative   Blood: x / Protein: Trace / Nitrite: Negative   Leuk Esterase: Large / RBC: 0-2 /HPF / WBC 11-25 /HPF   Sq Epi: x / Non Sq Epi: OCC /HPF / Bacteria: Few /HPF    RADIOLOGY & ADDITIONAL TESTS:  No new imaging     Imaging Personally Reviewed:  Consultant(s) Notes Reviewed:    Care Discussed with Consultants/Other Providers:

## 2018-02-13 NOTE — PROGRESS NOTE ADULT - PROBLEM SELECTOR PLAN 3
- fall at nursing facility this morning with no reported loss of conciousness, as per daughter, has remained alert and oriented as per baseline  - CTH performed x 2, no evidence of worsening of the left subdural collection. No intervention planned by NSGY   - q4 neuro checks, fall precautions  - PT eval in place

## 2018-02-13 NOTE — PROGRESS NOTE ADULT - PROBLEM SELECTOR PLAN 1
- improved, AAO x 2 and mentating well   - sodium 139 this morning --  with 9 point correction in 24hours likely 2/2 SIADH  - s/p fall with CTH not showing any evidence of acute ICH, and with decreased overall left subdural collection and repeat imaging not showing any worsening of the SDH  - could also be resolving UTI infection, culture growing >100,000 colonies E-coli

## 2018-02-13 NOTE — PROGRESS NOTE ADULT - PROBLEM SELECTOR PLAN 6
- ALT>AST 2:1 suggestive of liver etiology, likely acute liver injury in the setting of bradycardia   - transaminitis trending down, will continue to monitor

## 2018-02-13 NOTE — PROGRESS NOTE ADULT - ATTENDING COMMENTS
diuresing, improving  dc ctx after today for uti, f/u ucx  f/u cm/sw re: home services, daughter does not want to go back to rehab

## 2018-02-14 LAB
ALBUMIN SERPL ELPH-MCNC: 3.2 G/DL — LOW (ref 3.3–5)
ALP SERPL-CCNC: 151 U/L — HIGH (ref 40–120)
ALT FLD-CCNC: 39 U/L RC — SIGNIFICANT CHANGE UP (ref 10–45)
ANION GAP SERPL CALC-SCNC: 10 MMOL/L — SIGNIFICANT CHANGE UP (ref 5–17)
AST SERPL-CCNC: 29 U/L — SIGNIFICANT CHANGE UP (ref 10–40)
BASOPHILS # BLD AUTO: 0 K/UL — SIGNIFICANT CHANGE UP (ref 0–0.2)
BASOPHILS NFR BLD AUTO: 0.1 % — SIGNIFICANT CHANGE UP (ref 0–2)
BILIRUB SERPL-MCNC: 0.7 MG/DL — SIGNIFICANT CHANGE UP (ref 0.2–1.2)
BUN SERPL-MCNC: 31 MG/DL — HIGH (ref 7–23)
CALCIUM SERPL-MCNC: 9.4 MG/DL — SIGNIFICANT CHANGE UP (ref 8.4–10.5)
CHLORIDE SERPL-SCNC: 96 MMOL/L — SIGNIFICANT CHANGE UP (ref 96–108)
CK MB CFR SERPL CALC: 1 NG/ML — SIGNIFICANT CHANGE UP (ref 0–3.8)
CK SERPL-CCNC: 17 U/L — LOW (ref 25–170)
CO2 SERPL-SCNC: 31 MMOL/L — SIGNIFICANT CHANGE UP (ref 22–31)
CREAT SERPL-MCNC: 0.95 MG/DL — SIGNIFICANT CHANGE UP (ref 0.5–1.3)
EOSINOPHIL # BLD AUTO: 0 K/UL — SIGNIFICANT CHANGE UP (ref 0–0.5)
EOSINOPHIL NFR BLD AUTO: 0.4 % — SIGNIFICANT CHANGE UP (ref 0–6)
GLUCOSE BLDC GLUCOMTR-MCNC: 200 MG/DL — HIGH (ref 70–99)
GLUCOSE BLDC GLUCOMTR-MCNC: 215 MG/DL — HIGH (ref 70–99)
GLUCOSE BLDC GLUCOMTR-MCNC: 312 MG/DL — HIGH (ref 70–99)
GLUCOSE BLDC GLUCOMTR-MCNC: 327 MG/DL — HIGH (ref 70–99)
GLUCOSE SERPL-MCNC: 304 MG/DL — HIGH (ref 70–99)
HCT VFR BLD CALC: 33.1 % — LOW (ref 34.5–45)
HGB BLD-MCNC: 10.7 G/DL — LOW (ref 11.5–15.5)
LYMPHOCYTES # BLD AUTO: 0.4 K/UL — LOW (ref 1–3.3)
LYMPHOCYTES # BLD AUTO: 6.5 % — LOW (ref 13–44)
MAGNESIUM SERPL-MCNC: 2.5 MG/DL — SIGNIFICANT CHANGE UP (ref 1.6–2.6)
MCHC RBC-ENTMCNC: 31 PG — SIGNIFICANT CHANGE UP (ref 27–34)
MCHC RBC-ENTMCNC: 32.2 GM/DL — SIGNIFICANT CHANGE UP (ref 32–36)
MCV RBC AUTO: 96.3 FL — SIGNIFICANT CHANGE UP (ref 80–100)
MONOCYTES # BLD AUTO: 0.9 K/UL — SIGNIFICANT CHANGE UP (ref 0–0.9)
MONOCYTES NFR BLD AUTO: 14.2 % — HIGH (ref 2–14)
NEUTROPHILS # BLD AUTO: 5 K/UL — SIGNIFICANT CHANGE UP (ref 1.8–7.4)
NEUTROPHILS NFR BLD AUTO: 78.7 % — HIGH (ref 43–77)
PHOSPHATE SERPL-MCNC: 2.3 MG/DL — LOW (ref 2.5–4.5)
PLATELET # BLD AUTO: 176 K/UL — SIGNIFICANT CHANGE UP (ref 150–400)
POTASSIUM SERPL-MCNC: 4.5 MMOL/L — SIGNIFICANT CHANGE UP (ref 3.5–5.3)
POTASSIUM SERPL-SCNC: 4.5 MMOL/L — SIGNIFICANT CHANGE UP (ref 3.5–5.3)
PROT SERPL-MCNC: 6.8 G/DL — SIGNIFICANT CHANGE UP (ref 6–8.3)
RAPID RVP RESULT: SIGNIFICANT CHANGE UP
RBC # BLD: 3.44 M/UL — LOW (ref 3.8–5.2)
RBC # FLD: 17 % — HIGH (ref 10.3–14.5)
SODIUM SERPL-SCNC: 137 MMOL/L — SIGNIFICANT CHANGE UP (ref 135–145)
TROPONIN T SERPL-MCNC: 0.02 NG/ML — SIGNIFICANT CHANGE UP (ref 0–0.06)
WBC # BLD: 6.3 K/UL — SIGNIFICANT CHANGE UP (ref 3.8–10.5)
WBC # FLD AUTO: 6.3 K/UL — SIGNIFICANT CHANGE UP (ref 3.8–10.5)

## 2018-02-14 PROCEDURE — 71045 X-RAY EXAM CHEST 1 VIEW: CPT | Mod: 26

## 2018-02-14 PROCEDURE — 93010 ELECTROCARDIOGRAM REPORT: CPT

## 2018-02-14 PROCEDURE — 99233 SBSQ HOSP IP/OBS HIGH 50: CPT | Mod: GC

## 2018-02-14 RX ORDER — LEVETIRACETAM 250 MG/1
500 TABLET, FILM COATED ORAL
Qty: 0 | Refills: 0 | Status: DISCONTINUED | OUTPATIENT
Start: 2018-02-14 | End: 2018-02-16

## 2018-02-14 RX ORDER — ONDANSETRON 8 MG/1
4 TABLET, FILM COATED ORAL ONCE
Qty: 0 | Refills: 0 | Status: COMPLETED | OUTPATIENT
Start: 2018-02-14 | End: 2018-02-14

## 2018-02-14 RX ORDER — PIPERACILLIN AND TAZOBACTAM 4; .5 G/20ML; G/20ML
3.38 INJECTION, POWDER, LYOPHILIZED, FOR SOLUTION INTRAVENOUS EVERY 8 HOURS
Qty: 0 | Refills: 0 | Status: DISCONTINUED | OUTPATIENT
Start: 2018-02-14 | End: 2018-02-20

## 2018-02-14 RX ORDER — INSULIN LISPRO 100/ML
2 VIAL (ML) SUBCUTANEOUS
Qty: 0 | Refills: 0 | Status: DISCONTINUED | OUTPATIENT
Start: 2018-02-14 | End: 2018-02-16

## 2018-02-14 RX ORDER — CEFTRIAXONE 500 MG/1
1 INJECTION, POWDER, FOR SOLUTION INTRAMUSCULAR; INTRAVENOUS ONCE
Qty: 0 | Refills: 0 | Status: DISCONTINUED | OUTPATIENT
Start: 2018-02-14 | End: 2018-02-14

## 2018-02-14 RX ORDER — INSULIN GLARGINE 100 [IU]/ML
2 INJECTION, SOLUTION SUBCUTANEOUS AT BEDTIME
Qty: 0 | Refills: 0 | Status: DISCONTINUED | OUTPATIENT
Start: 2018-02-14 | End: 2018-02-16

## 2018-02-14 RX ORDER — LACOSAMIDE 50 MG/1
150 TABLET ORAL DAILY
Qty: 0 | Refills: 0 | Status: DISCONTINUED | OUTPATIENT
Start: 2018-02-14 | End: 2018-02-16

## 2018-02-14 RX ORDER — POTASSIUM PHOSPHATE, MONOBASIC POTASSIUM PHOSPHATE, DIBASIC 236; 224 MG/ML; MG/ML
15 INJECTION, SOLUTION INTRAVENOUS ONCE
Qty: 0 | Refills: 0 | Status: COMPLETED | OUTPATIENT
Start: 2018-02-14 | End: 2018-02-14

## 2018-02-14 RX ORDER — VANCOMYCIN HCL 1 G
1000 VIAL (EA) INTRAVENOUS EVERY 24 HOURS
Qty: 0 | Refills: 0 | Status: DISCONTINUED | OUTPATIENT
Start: 2018-02-14 | End: 2018-02-18

## 2018-02-14 RX ORDER — FUROSEMIDE 40 MG
40 TABLET ORAL ONCE
Qty: 0 | Refills: 0 | Status: COMPLETED | OUTPATIENT
Start: 2018-02-14 | End: 2018-02-14

## 2018-02-14 RX ADMIN — Medication 650 MILLIGRAM(S): at 13:59

## 2018-02-14 RX ADMIN — Medication 250 MILLIGRAM(S): at 13:59

## 2018-02-14 RX ADMIN — Medication 40 MILLIGRAM(S): at 16:27

## 2018-02-14 RX ADMIN — POTASSIUM PHOSPHATE, MONOBASIC POTASSIUM PHOSPHATE, DIBASIC 62.5 MILLIMOLE(S): 236; 224 INJECTION, SOLUTION INTRAVENOUS at 15:16

## 2018-02-14 RX ADMIN — ONDANSETRON 4 MILLIGRAM(S): 8 TABLET, FILM COATED ORAL at 10:06

## 2018-02-14 RX ADMIN — Medication 2: at 07:46

## 2018-02-14 RX ADMIN — Medication 8: at 17:22

## 2018-02-14 RX ADMIN — Medication 30 MILLILITER(S): at 07:53

## 2018-02-14 RX ADMIN — INSULIN GLARGINE 2 UNIT(S): 100 INJECTION, SOLUTION SUBCUTANEOUS at 21:55

## 2018-02-14 RX ADMIN — Medication 8: at 12:09

## 2018-02-14 RX ADMIN — LEVETIRACETAM 400 MILLIGRAM(S): 250 TABLET, FILM COATED ORAL at 05:22

## 2018-02-14 RX ADMIN — Medication 2 UNIT(S): at 17:23

## 2018-02-14 RX ADMIN — Medication 650 MILLIGRAM(S): at 13:39

## 2018-02-14 RX ADMIN — LEVETIRACETAM 500 MILLIGRAM(S): 250 TABLET, FILM COATED ORAL at 21:56

## 2018-02-14 RX ADMIN — LACOSAMIDE 130 MILLIGRAM(S): 50 TABLET ORAL at 09:44

## 2018-02-14 RX ADMIN — PIPERACILLIN AND TAZOBACTAM 25 GRAM(S): 4; .5 INJECTION, POWDER, LYOPHILIZED, FOR SOLUTION INTRAVENOUS at 15:12

## 2018-02-14 RX ADMIN — PIPERACILLIN AND TAZOBACTAM 25 GRAM(S): 4; .5 INJECTION, POWDER, LYOPHILIZED, FOR SOLUTION INTRAVENOUS at 21:56

## 2018-02-14 NOTE — PROGRESS NOTE ADULT - PROBLEM SELECTOR PLAN 7
- As per conversation with both daughters - - patient is FULL CODE  - DVT PPx: SCDs     Dispo: Encephalopathy improved. Family wants to take her home with home PT services.     Anneliese Robles  PGY1  [phone]

## 2018-02-14 NOTE — PROGRESS NOTE ADULT - PROBLEM SELECTOR PLAN 3
- fall at nursing facility this morning with no reported loss of conciousness, as per daughter, has remained alert and oriented as per baseline  - CTH performed x 2, no evidence of worsening of the left subdural collection. No intervention planned by NSVIDA   - q4 neuro checks, fall precautions  - family wants patient to go home - - will go home with home PT services

## 2018-02-14 NOTE — PROGRESS NOTE ADULT - SUBJECTIVE AND OBJECTIVE BOX
Patient is a 89y old  Female who presents with a chief complaint of fall and altered mental status (11 Feb 2018 18:54)    SUBJECTIVE / OVERNIGHT EVENTS:  No acute events overnight. This morning - - patient was complaining of gassy abdominal pain and appeared to be in a moderate amount of distress. As per daughter who was at bedside - - has these episodes from time to time. Maalox given with resolution of symptoms. Obtained EKG with no evidence of any ischemic ST - T wave changes noted.     MEDICATIONS  (STANDING):  dextrose 5%. 1000 milliLiter(s) (50 mL/Hr) IV Continuous <Continuous>  dextrose 50% Injectable 12.5 Gram(s) IV Push once  dextrose 50% Injectable 25 Gram(s) IV Push once  dextrose 50% Injectable 25 Gram(s) IV Push once  insulin lispro (HumaLOG) corrective regimen sliding scale   SubCutaneous three times a day before meals  insulin lispro (HumaLOG) corrective regimen sliding scale   SubCutaneous at bedtime  lacosamide IVPB 150 milliGRAM(s) IV Intermittent every 24 hours  levETIRAcetam  IVPB 500 milliGRAM(s) IV Intermittent every 12 hours    MEDICATIONS  (PRN):  acetaminophen   Tablet. 650 milliGRAM(s) Oral every 6 hours PRN mild to moderate pain  aluminum hydroxide/magnesium hydroxide/simethicone Suspension 30 milliLiter(s) Oral every 4 hours PRN Dyspepsia  dextrose Gel 1 Dose(s) Oral once PRN Blood Glucose LESS THAN 70 milliGRAM(s)/deciliter  glucagon  Injectable 1 milliGRAM(s) IntraMuscular once PRN Glucose LESS THAN 70 milligrams/deciliter    Vital Signs Last 24 Hrs  T(C): 36.9 (14 Feb 2018 04:19), Max: 37.1 (13 Feb 2018 11:39)  T(F): 98.4 (14 Feb 2018 04:19), Max: 98.8 (13 Feb 2018 11:39)  HR: 48 (14 Feb 2018 04:19) (43 - 52)  BP: 153/55 (14 Feb 2018 04:19) (123/54 - 163/73)  BP(mean): --  RR: 18 (14 Feb 2018 04:19) (18 - 19)  SpO2: 97% (14 Feb 2018 04:19) (97% - 100%)    CAPILLARY BLOOD GLUCOSE  POCT Blood Glucose.: 200 mg/dL (14 Feb 2018 07:30)  POCT Blood Glucose.: 268 mg/dL (13 Feb 2018 21:16)  POCT Blood Glucose.: 213 mg/dL (13 Feb 2018 16:11)  POCT Blood Glucose.: 211 mg/dL (13 Feb 2018 11:25)    I&O's Summary    13 Feb 2018 07:01  -  14 Feb 2018 07:00  --------------------------------------------------------  IN: 610 mL / OUT: 300 mL / NET: 310 mL    PHYSICAL EXAM  General: elderly frail woman, appeared to be in mild distress due to gas pain.    HEENT: PERRL, Sclera anicteric   Cardiovascular: Bradycardic with irregular rhythm, RITESH 4/6  No rubs or gallops  Lungs: Off oxygen. Lungs clear to auscultation bilaterally with diminished breath sounds at the bases.   Abdomen: Soft and distended. Bowel sounds present. Nontender to palpation. No organomegaly   Extremities: 2+ pitting edema to the mid shin. No cyanosis and extremities otherwise well perfused.  Neuro: AAO x 2.   Skin: No skin changes noted     LABS:                        10.3   4.96  )-----------( 251      ( 13 Feb 2018 07:47 )             32.5     02-13    139  |  98  |  41<H>  ----------------------------<  83  4.2   |  31  |  1.09    Ca    9.4      13 Feb 2018 07:33  Phos  3.2     02-13  Mg     2.9     02-13    TPro  6.5  /  Alb  3.0<L>  /  TBili  0.4  /  DBili  x   /  AST  23  /  ALT  46<H>  /  AlkPhos  132<H>  02-13    RADIOLOGY & ADDITIONAL TESTS:  No new imaging     Imaging Personally Reviewed:  Consultant(s) Notes Reviewed:    Care Discussed with Consultants/Other Providers:

## 2018-02-14 NOTE — PROGRESS NOTE ADULT - PROBLEM SELECTOR PLAN 5
- etiology 2/2 congestive hepatopathy with improvement in transaminases with diuresis   - will continue to monitor

## 2018-02-14 NOTE — PROGRESS NOTE ADULT - PROBLEM SELECTOR PLAN 1
- improved, AAO x 2 and mentating well   - likely 2/2 metabolic component with some cerebral edema from low sodium levels which have improved   - s/p fall with CTH not showing any evidence of acute ICH, and with decreased overall left subdural collection and repeat imaging not showing any worsening of the SDH  - finished three day course of CTX for E-coli UTI

## 2018-02-14 NOTE — PROGRESS NOTE ADULT - PROBLEM SELECTOR PLAN 6
- rates are in the 30s-40s and has been off of beta blocker since last discharge, currently normotensive  - off of anticoagulation in setting of SDH - rates are in the 40s-50s and has been off of beta blocker since last discharge, currently normotensive  - off of anticoagulation in setting of SDH

## 2018-02-15 DIAGNOSIS — R50.9 FEVER, UNSPECIFIED: ICD-10-CM

## 2018-02-15 LAB
ALBUMIN SERPL ELPH-MCNC: 3.1 G/DL — LOW (ref 3.3–5)
ALP SERPL-CCNC: 142 U/L — HIGH (ref 40–120)
ALT FLD-CCNC: 44 U/L — SIGNIFICANT CHANGE UP (ref 10–45)
ANION GAP SERPL CALC-SCNC: 11 MMOL/L — SIGNIFICANT CHANGE UP (ref 5–17)
APPEARANCE UR: CLEAR — SIGNIFICANT CHANGE UP
AST SERPL-CCNC: 32 U/L — SIGNIFICANT CHANGE UP (ref 10–40)
BILIRUB SERPL-MCNC: 0.7 MG/DL — SIGNIFICANT CHANGE UP (ref 0.2–1.2)
BILIRUB UR-MCNC: NEGATIVE — SIGNIFICANT CHANGE UP
BUN SERPL-MCNC: 33 MG/DL — HIGH (ref 7–23)
CALCIUM SERPL-MCNC: 9.5 MG/DL — SIGNIFICANT CHANGE UP (ref 8.4–10.5)
CHLORIDE SERPL-SCNC: 98 MMOL/L — SIGNIFICANT CHANGE UP (ref 96–108)
CO2 SERPL-SCNC: 30 MMOL/L — SIGNIFICANT CHANGE UP (ref 22–31)
COLOR SPEC: YELLOW — SIGNIFICANT CHANGE UP
CREAT SERPL-MCNC: 1.25 MG/DL — SIGNIFICANT CHANGE UP (ref 0.5–1.3)
DIFF PNL FLD: NEGATIVE — SIGNIFICANT CHANGE UP
GLUCOSE BLDC GLUCOMTR-MCNC: 200 MG/DL — HIGH (ref 70–99)
GLUCOSE BLDC GLUCOMTR-MCNC: 223 MG/DL — HIGH (ref 70–99)
GLUCOSE BLDC GLUCOMTR-MCNC: 251 MG/DL — HIGH (ref 70–99)
GLUCOSE BLDC GLUCOMTR-MCNC: 253 MG/DL — HIGH (ref 70–99)
GLUCOSE SERPL-MCNC: 250 MG/DL — HIGH (ref 70–99)
GLUCOSE UR QL: NEGATIVE — SIGNIFICANT CHANGE UP
HCT VFR BLD CALC: 35.6 % — SIGNIFICANT CHANGE UP (ref 34.5–45)
HGB BLD-MCNC: 11.1 G/DL — LOW (ref 11.5–15.5)
KETONES UR-MCNC: NEGATIVE — SIGNIFICANT CHANGE UP
LEUKOCYTE ESTERASE UR-ACNC: ABNORMAL
MAGNESIUM SERPL-MCNC: 2.6 MG/DL — SIGNIFICANT CHANGE UP (ref 1.6–2.6)
MCHC RBC-ENTMCNC: 30.3 PG — SIGNIFICANT CHANGE UP (ref 27–34)
MCHC RBC-ENTMCNC: 31.2 GM/DL — LOW (ref 32–36)
MCV RBC AUTO: 97.3 FL — SIGNIFICANT CHANGE UP (ref 80–100)
NITRITE UR-MCNC: NEGATIVE — SIGNIFICANT CHANGE UP
PH UR: 5 — SIGNIFICANT CHANGE UP (ref 5–8)
PHOSPHATE SERPL-MCNC: 3.1 MG/DL — SIGNIFICANT CHANGE UP (ref 2.5–4.5)
PLATELET # BLD AUTO: 185 K/UL — SIGNIFICANT CHANGE UP (ref 150–400)
POTASSIUM SERPL-MCNC: 4.8 MMOL/L — SIGNIFICANT CHANGE UP (ref 3.5–5.3)
POTASSIUM SERPL-SCNC: 4.8 MMOL/L — SIGNIFICANT CHANGE UP (ref 3.5–5.3)
PROT SERPL-MCNC: 7.2 G/DL — SIGNIFICANT CHANGE UP (ref 6–8.3)
PROT UR-MCNC: SIGNIFICANT CHANGE UP
RBC # BLD: 3.66 M/UL — LOW (ref 3.8–5.2)
RBC # FLD: 18.4 % — HIGH (ref 10.3–14.5)
SODIUM SERPL-SCNC: 139 MMOL/L — SIGNIFICANT CHANGE UP (ref 135–145)
SP GR SPEC: 1.02 — SIGNIFICANT CHANGE UP (ref 1.01–1.02)
UROBILINOGEN FLD QL: NEGATIVE — SIGNIFICANT CHANGE UP
WBC # BLD: 6.32 K/UL — SIGNIFICANT CHANGE UP (ref 3.8–10.5)
WBC # FLD AUTO: 6.32 K/UL — SIGNIFICANT CHANGE UP (ref 3.8–10.5)

## 2018-02-15 PROCEDURE — 93970 EXTREMITY STUDY: CPT | Mod: 26

## 2018-02-15 PROCEDURE — 93010 ELECTROCARDIOGRAM REPORT: CPT

## 2018-02-15 PROCEDURE — 99233 SBSQ HOSP IP/OBS HIGH 50: CPT | Mod: GC

## 2018-02-15 RX ORDER — FUROSEMIDE 40 MG
40 TABLET ORAL ONCE
Qty: 0 | Refills: 0 | Status: COMPLETED | OUTPATIENT
Start: 2018-02-15 | End: 2018-02-15

## 2018-02-15 RX ORDER — IPRATROPIUM/ALBUTEROL SULFATE 18-103MCG
3 AEROSOL WITH ADAPTER (GRAM) INHALATION ONCE
Qty: 0 | Refills: 0 | Status: COMPLETED | OUTPATIENT
Start: 2018-02-15 | End: 2018-02-15

## 2018-02-15 RX ORDER — FUROSEMIDE 40 MG
40 TABLET ORAL
Qty: 0 | Refills: 0 | Status: DISCONTINUED | OUTPATIENT
Start: 2018-02-15 | End: 2018-02-17

## 2018-02-15 RX ADMIN — Medication 2 UNIT(S): at 17:37

## 2018-02-15 RX ADMIN — LEVETIRACETAM 500 MILLIGRAM(S): 250 TABLET, FILM COATED ORAL at 12:03

## 2018-02-15 RX ADMIN — Medication 2 UNIT(S): at 08:08

## 2018-02-15 RX ADMIN — Medication 2: at 17:37

## 2018-02-15 RX ADMIN — PIPERACILLIN AND TAZOBACTAM 25 GRAM(S): 4; .5 INJECTION, POWDER, LYOPHILIZED, FOR SOLUTION INTRAVENOUS at 17:32

## 2018-02-15 RX ADMIN — Medication 250 MILLIGRAM(S): at 12:03

## 2018-02-15 RX ADMIN — Medication 40 MILLIGRAM(S): at 10:04

## 2018-02-15 RX ADMIN — Medication 40 MILLIGRAM(S): at 17:00

## 2018-02-15 RX ADMIN — Medication 2 UNIT(S): at 12:02

## 2018-02-15 RX ADMIN — PIPERACILLIN AND TAZOBACTAM 25 GRAM(S): 4; .5 INJECTION, POWDER, LYOPHILIZED, FOR SOLUTION INTRAVENOUS at 06:10

## 2018-02-15 RX ADMIN — Medication 6: at 08:08

## 2018-02-15 RX ADMIN — Medication 6: at 12:03

## 2018-02-15 RX ADMIN — Medication 3 MILLILITER(S): at 10:04

## 2018-02-15 RX ADMIN — LACOSAMIDE 150 MILLIGRAM(S): 50 TABLET ORAL at 12:03

## 2018-02-15 RX ADMIN — INSULIN GLARGINE 2 UNIT(S): 100 INJECTION, SOLUTION SUBCUTANEOUS at 21:41

## 2018-02-15 RX ADMIN — LEVETIRACETAM 500 MILLIGRAM(S): 250 TABLET, FILM COATED ORAL at 21:41

## 2018-02-15 NOTE — PROGRESS NOTE ADULT - PROBLEM SELECTOR PLAN 5
- creatinine rise again today in the setting of IV lasix use for the past several days   - renally dose meds  - avoid nephrotoxic agents

## 2018-02-15 NOTE — PROGRESS NOTE ADULT - ATTENDING COMMENTS
mental status improved, pt more alert today responding to questions, however tachypneic on exam  cont abx, f/u cultures  will increase diuresis to 40mg iv bid, strict i/o, daily wts

## 2018-02-15 NOTE — PROGRESS NOTE ADULT - PROBLEM SELECTOR PLAN 1
- patient had spiked a fever last night, with increased work of breathing -- on SCDs but this could possibly be a PE. Held off of AC in the setting of SDH s/p fall   - CXR: showing new bilateral pleural effusions, but otherwise no acute pathology, UA weakly positive but completed CTX x 3 days for Ecoli which was sensitive   - RVP negative, blood cultures pending - - > started on vancomycin and zosyn

## 2018-02-15 NOTE — PROGRESS NOTE ADULT - PROBLEM SELECTOR PLAN 2
- improved, AAO x 2 and mentating well, etiology secondary to metabolic causes with low sodium causing cerebral edema   - s/p fall with CTH not showing any evidence of acute ICH, and with decreased overall left subdural collection and repeat imaging not showing any worsening of the SDH  - s/p three doses of CTX for UTI

## 2018-02-15 NOTE — PROGRESS NOTE ADULT - SUBJECTIVE AND OBJECTIVE BOX
Patient is a 89y old  Female who presents with a chief complaint of fall and altered mental status (2018 18:54)    SUBJECTIVE / OVERNIGHT EVENTS:  Patient seen at bedside this morning -- mentating appropriately. Having abdominal breathing but on 0L NC.     MEDICATIONS  (STANDING):  dextrose 5%. 1000 milliLiter(s) (50 mL/Hr) IV Continuous <Continuous>  dextrose 50% Injectable 12.5 Gram(s) IV Push once  dextrose 50% Injectable 25 Gram(s) IV Push once  dextrose 50% Injectable 25 Gram(s) IV Push once  insulin glargine Injectable (LANTUS) 2 Unit(s) SubCutaneous at bedtime  insulin lispro (HumaLOG) corrective regimen sliding scale   SubCutaneous three times a day before meals  insulin lispro (HumaLOG) corrective regimen sliding scale   SubCutaneous at bedtime  insulin lispro Injectable (HumaLOG) 2 Unit(s) SubCutaneous three times a day before meals  lacosamide 150 milliGRAM(s) Oral daily  levETIRAcetam 500 milliGRAM(s) Oral two times a day  piperacillin/tazobactam IVPB. 3.375 Gram(s) IV Intermittent every 8 hours  vancomycin  IVPB 1000 milliGRAM(s) IV Intermittent every 24 hours    MEDICATIONS  (PRN):  acetaminophen   Tablet. 650 milliGRAM(s) Oral every 6 hours PRN mild to moderate pain  aluminum hydroxide/magnesium hydroxide/simethicone Suspension 30 milliLiter(s) Oral every 4 hours PRN Dyspepsia  dextrose Gel 1 Dose(s) Oral once PRN Blood Glucose LESS THAN 70 milliGRAM(s)/deciliter  glucagon  Injectable 1 milliGRAM(s) IntraMuscular once PRN Glucose LESS THAN 70 milligrams/deciliter    Vital Signs Last 24 Hrs  T(C): 37 (15 Feb 2018 08:20), Max: 38.2 (2018 13:25)  T(F): 98.6 (15 Feb 2018 08:20), Max: 100.7 (2018 13:25)  HR: 52 (15 Feb 2018 08:20) (44 - 54)  BP: 156/69 (15 Feb 2018 08:20) (119/48 - 156/69)  BP(mean): --  RR: 18 (15 Feb 2018 08:20) (18 - 19)  SpO2: 91% (15 Feb 2018 08:20) (91% - 97%)    CAPILLARY BLOOD GLUCOSE    POCT Blood Glucose.: 253 mg/dL (15 Feb 2018 07:38)  POCT Blood Glucose.: 215 mg/dL (2018 21:24)  POCT Blood Glucose.: 327 mg/dL (2018 16:15)  POCT Blood Glucose.: 312 mg/dL (2018 11:36)    I&O's Summary    2018 07:01  -  15 Feb 2018 07:00  --------------------------------------------------------  IN: 1490 mL / OUT: 1000 mL / NET: 490 mL    PHYSICAL EXAM  General: elderly frail woman, appeared to be in mild distress due to gas pain.    HEENT: PERRL, Sclera anicteric   Cardiovascular: Bradycardic with irregular rhythm, RITESH 4/6  No rubs or gallops  Lungs: Off oxygen. Note of some abdominal breathing on exam. Crackles appreciated diffusely and bilaterally    Abdomen: Soft and distended. Bowel sounds present. Nontender to palpation. No organomegaly   Extremities: 2+ pitting edema to the mid shin. No cyanosis and extremities otherwise well perfused.  Neuro: AAO x 2.   Skin: No skin changes noted     LABS:                        11.1   6.32  )-----------( 185      ( 15 Feb 2018 07:29 )             35.6     02-15    139  |  98  |  33<H>  ----------------------------<  250<H>  4.8   |  30  |  1.25    Ca    9.5      15 Feb 2018 07:36  Phos  3.1     02-15  Mg     2.6     02-15    TPro  7.2  /  Alb  3.1<L>  /  TBili  0.7  /  DBili  x   /  AST  32  /  ALT  44  /  AlkPhos  142<H>  02-15      CARDIAC MARKERS ( 2018 11:51 )  x     / 0.02 ng/mL / 17 U/L / x     / 1.0 ng/mL    Urinalysis Basic - ( 15 Feb 2018 03:37 )    Color: Yellow / Appearance: Clear / S.016 / pH: x  Gluc: x / Ketone: Negative  / Bili: Negative / Urobili: Negative   Blood: x / Protein: Trace / Nitrite: Negative   Leuk Esterase: Small / RBC: 0-2 /HPF / WBC 5-10 /HPF   Sq Epi: x / Non Sq Epi: Occasional /HPF / Bacteria: x    RVP negative     RADIOLOGY & ADDITIONAL TESTS:  < from: Xray Chest 1 View- PORTABLE-Urgent (18 @ 13:57) >  IMPRESSION:  Bilateral patchy opacities consistent pulmonary edema that   is grossly unchanged from previous radiograph.  New small bilateral pleural effusions.    < end of copied text >    Imaging Personally Reviewed:  Consultant(s) Notes Reviewed:    Care Discussed with Consultants/Other Providers:

## 2018-02-15 NOTE — PROGRESS NOTE ADULT - PROBLEM SELECTOR PLAN 7
- rates are in the 40s-50s and has been off of beta blocker since last discharge, currently normotensive  - off of anticoagulation in setting of SDH

## 2018-02-15 NOTE — CHART NOTE - NSCHARTNOTEFT_GEN_A_CORE
Wheelchair with b/l leg rest for long distances and outside doctor visits. Patient has diagnosis of subdural hematoma.       Hiren Terrazas, PGY3  [phone] Patient needs hospital bed  for long distances and outside doctor visits. Patient has diagnosis of subdural hematoma.       Hiren Terrazas, PGY3  [phone] Patient needs semi electric hospital bed. Patient has diagnosis of subdural hematoma.       Hiren Terrazas, PGY3  [phone] Mrs. Dina Pfeiffer, with past medical history of type 2 diabetes, afib, subdural hematoma, cervical ca., endocarditis, and hypternsion erquires [facility] bed.     Mrs. Koo is deconditioned due to multiple admissions secondary to subdural hematoma. She now requires max assistance to ambulate short distances. When in bed, she requires elevation of head of the bed due t shortness of breath secondary to bilateral pleural effusions.     Mrs. Koo's medical condition necessitates this level of care at home with above medical equipment based on patient safety health needs.       Hiren Terrazas, PGY3  [phone]

## 2018-02-15 NOTE — CHART NOTE - NSCHARTNOTEFT_GEN_A_CORE
Wheelchair with b/l leg rest for long distances and outside doctor visits. Patient has diagnosis of subdural hematoma.       Hiren Terrazas, PGY3  [phone] Patient needs wheelchair with b/l leg rest for long distances and outside doctor visits. Patient has diagnosis of subdural hematoma.       Hiren Terrazas, PGY3  [phone] Mrs. Dina Koo with past medical history of subdural hematoma, bilateral pleural effusions, type 2 diabetes, hypertension, afib, cervical cancer, and endocarditis, requires a transport wheelchair.     Mrs. Koo requires assistance to transport to doctor's offices as she is unable to ambulate long distances.     This patient's medical condition necessitates this level fo care at home with above medical equipment based on patient safety and health needs.       Hiren Terrazas, PGY3  [phone]

## 2018-02-16 LAB
ALBUMIN SERPL ELPH-MCNC: 3.2 G/DL — LOW (ref 3.3–5)
ALP SERPL-CCNC: 162 U/L — HIGH (ref 40–120)
ALT FLD-CCNC: 65 U/L — HIGH (ref 10–45)
ANION GAP SERPL CALC-SCNC: 14 MMOL/L — SIGNIFICANT CHANGE UP (ref 5–17)
ANION GAP SERPL CALC-SCNC: 8 MMOL/L — SIGNIFICANT CHANGE UP (ref 5–17)
AST SERPL-CCNC: 70 U/L — HIGH (ref 10–40)
BILIRUB SERPL-MCNC: 0.6 MG/DL — SIGNIFICANT CHANGE UP (ref 0.2–1.2)
BUN SERPL-MCNC: 28 MG/DL — HIGH (ref 7–23)
BUN SERPL-MCNC: 29 MG/DL — HIGH (ref 7–23)
CALCIUM SERPL-MCNC: 10.1 MG/DL — SIGNIFICANT CHANGE UP (ref 8.4–10.5)
CALCIUM SERPL-MCNC: 10.2 MG/DL — SIGNIFICANT CHANGE UP (ref 8.4–10.5)
CHLORIDE SERPL-SCNC: 100 MMOL/L — SIGNIFICANT CHANGE UP (ref 96–108)
CHLORIDE SERPL-SCNC: 99 MMOL/L — SIGNIFICANT CHANGE UP (ref 96–108)
CO2 SERPL-SCNC: 28 MMOL/L — SIGNIFICANT CHANGE UP (ref 22–31)
CO2 SERPL-SCNC: 33 MMOL/L — HIGH (ref 22–31)
CREAT SERPL-MCNC: 1.15 MG/DL — SIGNIFICANT CHANGE UP (ref 0.5–1.3)
CREAT SERPL-MCNC: 1.21 MG/DL — SIGNIFICANT CHANGE UP (ref 0.5–1.3)
CULTURE RESULTS: SIGNIFICANT CHANGE UP
CULTURE RESULTS: SIGNIFICANT CHANGE UP
GLUCOSE BLDC GLUCOMTR-MCNC: 194 MG/DL — HIGH (ref 70–99)
GLUCOSE BLDC GLUCOMTR-MCNC: 211 MG/DL — HIGH (ref 70–99)
GLUCOSE BLDC GLUCOMTR-MCNC: 238 MG/DL — HIGH (ref 70–99)
GLUCOSE BLDC GLUCOMTR-MCNC: 295 MG/DL — HIGH (ref 70–99)
GLUCOSE SERPL-MCNC: 216 MG/DL — HIGH (ref 70–99)
GLUCOSE SERPL-MCNC: 258 MG/DL — HIGH (ref 70–99)
HCT VFR BLD CALC: 33.5 % — LOW (ref 34.5–45)
HGB BLD-MCNC: 10.1 G/DL — LOW (ref 11.5–15.5)
MAGNESIUM SERPL-MCNC: 2.2 MG/DL — SIGNIFICANT CHANGE UP (ref 1.6–2.6)
MCHC RBC-ENTMCNC: 29.1 PG — SIGNIFICANT CHANGE UP (ref 27–34)
MCHC RBC-ENTMCNC: 30.1 GM/DL — LOW (ref 32–36)
MCV RBC AUTO: 96.5 FL — SIGNIFICANT CHANGE UP (ref 80–100)
PHOSPHATE SERPL-MCNC: 3.3 MG/DL — SIGNIFICANT CHANGE UP (ref 2.5–4.5)
PLATELET # BLD AUTO: 183 K/UL — SIGNIFICANT CHANGE UP (ref 150–400)
POTASSIUM SERPL-MCNC: 4 MMOL/L — SIGNIFICANT CHANGE UP (ref 3.5–5.3)
POTASSIUM SERPL-MCNC: 5.6 MMOL/L — HIGH (ref 3.5–5.3)
POTASSIUM SERPL-SCNC: 4 MMOL/L — SIGNIFICANT CHANGE UP (ref 3.5–5.3)
POTASSIUM SERPL-SCNC: 5.6 MMOL/L — HIGH (ref 3.5–5.3)
PROT SERPL-MCNC: 7 G/DL — SIGNIFICANT CHANGE UP (ref 6–8.3)
RBC # BLD: 3.47 M/UL — LOW (ref 3.8–5.2)
RBC # FLD: 18.1 % — HIGH (ref 10.3–14.5)
SODIUM SERPL-SCNC: 141 MMOL/L — SIGNIFICANT CHANGE UP (ref 135–145)
SODIUM SERPL-SCNC: 141 MMOL/L — SIGNIFICANT CHANGE UP (ref 135–145)
SPECIMEN SOURCE: SIGNIFICANT CHANGE UP
SPECIMEN SOURCE: SIGNIFICANT CHANGE UP
WBC # BLD: 5.35 K/UL — SIGNIFICANT CHANGE UP (ref 3.8–10.5)
WBC # FLD AUTO: 5.35 K/UL — SIGNIFICANT CHANGE UP (ref 3.8–10.5)

## 2018-02-16 PROCEDURE — 76705 ECHO EXAM OF ABDOMEN: CPT | Mod: 26,RT

## 2018-02-16 PROCEDURE — 99233 SBSQ HOSP IP/OBS HIGH 50: CPT | Mod: GC

## 2018-02-16 RX ORDER — INSULIN LISPRO 100/ML
5 VIAL (ML) SUBCUTANEOUS
Qty: 0 | Refills: 0 | Status: DISCONTINUED | OUTPATIENT
Start: 2018-02-16 | End: 2018-02-27

## 2018-02-16 RX ORDER — INSULIN GLARGINE 100 [IU]/ML
8 INJECTION, SOLUTION SUBCUTANEOUS AT BEDTIME
Qty: 0 | Refills: 0 | Status: DISCONTINUED | OUTPATIENT
Start: 2018-02-16 | End: 2018-02-27

## 2018-02-16 RX ORDER — FUROSEMIDE 40 MG
20 TABLET ORAL ONCE
Qty: 0 | Refills: 0 | Status: COMPLETED | OUTPATIENT
Start: 2018-02-16 | End: 2018-02-16

## 2018-02-16 RX ORDER — INSULIN HUMAN 100 [IU]/ML
10 INJECTION, SOLUTION SUBCUTANEOUS ONCE
Qty: 0 | Refills: 0 | Status: DISCONTINUED | OUTPATIENT
Start: 2018-02-16 | End: 2018-02-16

## 2018-02-16 RX ORDER — DEXTROSE 50 % IN WATER 50 %
50 SYRINGE (ML) INTRAVENOUS ONCE
Qty: 0 | Refills: 0 | Status: DISCONTINUED | OUTPATIENT
Start: 2018-02-16 | End: 2018-02-16

## 2018-02-16 RX ORDER — LACOSAMIDE 50 MG/1
150 TABLET ORAL
Qty: 0 | Refills: 0 | Status: DISCONTINUED | OUTPATIENT
Start: 2018-02-16 | End: 2018-02-23

## 2018-02-16 RX ORDER — ALBUTEROL 90 UG/1
10 AEROSOL, METERED ORAL ONCE
Qty: 0 | Refills: 0 | Status: DISCONTINUED | OUTPATIENT
Start: 2018-02-16 | End: 2018-02-16

## 2018-02-16 RX ADMIN — INSULIN GLARGINE 8 UNIT(S): 100 INJECTION, SOLUTION SUBCUTANEOUS at 21:24

## 2018-02-16 RX ADMIN — Medication 40 MILLIGRAM(S): at 05:20

## 2018-02-16 RX ADMIN — Medication 20 MILLIGRAM(S): at 12:50

## 2018-02-16 RX ADMIN — PIPERACILLIN AND TAZOBACTAM 25 GRAM(S): 4; .5 INJECTION, POWDER, LYOPHILIZED, FOR SOLUTION INTRAVENOUS at 18:00

## 2018-02-16 RX ADMIN — PIPERACILLIN AND TAZOBACTAM 25 GRAM(S): 4; .5 INJECTION, POWDER, LYOPHILIZED, FOR SOLUTION INTRAVENOUS at 08:00

## 2018-02-16 RX ADMIN — Medication 2 UNIT(S): at 09:06

## 2018-02-16 RX ADMIN — LEVETIRACETAM 500 MILLIGRAM(S): 250 TABLET, FILM COATED ORAL at 05:21

## 2018-02-16 RX ADMIN — Medication 2: at 17:54

## 2018-02-16 RX ADMIN — PIPERACILLIN AND TAZOBACTAM 25 GRAM(S): 4; .5 INJECTION, POWDER, LYOPHILIZED, FOR SOLUTION INTRAVENOUS at 00:12

## 2018-02-16 RX ADMIN — Medication 40 MILLIGRAM(S): at 17:54

## 2018-02-16 RX ADMIN — Medication 5 UNIT(S): at 17:54

## 2018-02-16 RX ADMIN — PIPERACILLIN AND TAZOBACTAM 25 GRAM(S): 4; .5 INJECTION, POWDER, LYOPHILIZED, FOR SOLUTION INTRAVENOUS at 23:07

## 2018-02-16 RX ADMIN — Medication 6: at 12:50

## 2018-02-16 RX ADMIN — Medication 4: at 09:06

## 2018-02-16 RX ADMIN — Medication 250 MILLIGRAM(S): at 12:56

## 2018-02-16 RX ADMIN — LACOSAMIDE 150 MILLIGRAM(S): 50 TABLET ORAL at 18:05

## 2018-02-16 NOTE — PROGRESS NOTE ADULT - PROBLEM SELECTOR PLAN 3
- hx of bradycardia on recent admission with metoprolol discontinued prior to discharge  - remains bradycardic with rates into the 40s -50s, and asymptomatic: mentating well with appropriate pressures    - seen by EP with no emergent planned intervention - hx of bradycardia on recent admission with metoprolol discontinued prior to discharge  - remains bradycardic with rates into the 50s, and asymptomatic: mentating well with appropriate pressures    - seen by EP with no emergent planned intervention

## 2018-02-16 NOTE — PROGRESS NOTE ADULT - PROBLEM SELECTOR PLAN 7
- rates are in the 40s-50s and has been off of beta blocker since last discharge, currently normotensive  - off of anticoagulation in setting of SDH - rates are in the 50s and has been off of beta blocker since last discharge, currently normotensive  - off of anticoagulation in setting of SDH

## 2018-02-16 NOTE — PROGRESS NOTE ADULT - PROBLEM SELECTOR PLAN 1
- patient had spiked a fever last night, with increased work of breathing -- on SCDs but this could possibly be a PE. Held off of AC in the setting of SDH s/p fall   - CXR: showing new bilateral pleural effusions, but otherwise no acute pathology, UA weakly positive but completed CTX x 3 days for Ecoli which was sensitive   - RVP negative, blood cultures pending - - > started on vancomycin and zosyn - work up in so far unremarkable, with culture negative, UA not consistent with UTI s/p Rx and CXR no focal consolidations  - given transaminitis that is picking up --  will get RUQ U/S to evaluate for cholecystitis  - will keep on vanc/zosyn in the interim  - also could be a possible PE but dopplers negative for DVT yesterday, not a candidate for AC regardless given SDH

## 2018-02-16 NOTE — PROGRESS NOTE ADULT - ATTENDING COMMENTS
no aeon  still tachypneic albeit mildly improved. cont diuresis. PE is on differential however cannot anticoagulate given SDH and dopplers neg for dvt, and clinically seems more likely to be volume overload   check ruq u/s given transaminitis. t/c CT chest no aeon  still tachypneic albeit mildly improved. cont diuresis. PE is on differential however cannot anticoagulate given SDH and dopplers neg for dvt, and clinically seems more likely to be volume overload   will d/w neuro re: changing antiepileptic regimen as family reports pt seemed to have worsening mental status since initiation of keppra. can check keppra levels.   check ruq u/s given transaminitis  prn simethicone for gas

## 2018-02-16 NOTE — PROGRESS NOTE ADULT - PROBLEM SELECTOR PLAN 8
- As per conversation with both daughters - - patient is FULL CODE  - DVT PPx: SCDs     Dispo: Febrile yesterday with workup essentially unremarkable for an infection. Increased work of breathing today, concerning of possible PE ? - - > off of AC in the setting of SDH and presentation for fall. Not a candidate for acute intervention. Will discuss with family.     Anneliese Robles  PGY1  [phone] - As per conversation with both daughters - - patient is FULL CODE  - DVT PPx: SCDs     Dispo: Dopplers negative for DVT. Still no infectious source - - will get RUQ u/s given fever day prior with transaminitis. C/W Vanc/zosyn. Will place [place] to monitor I and O to ensure that she is adequately diuresing.      Anneliese Robles  PGY1  [phone]

## 2018-02-16 NOTE — PROGRESS NOTE ADULT - PROBLEM SELECTOR PLAN 6
- etiology 2/2 congestive hepatopathy  - will continue to monitor, transaminases are currently stable and mildly elevated - etiology initially thought to be 2/2 congestive hepatopathy -- will get RUQ U/S to rule out cholecystitis   - will continue to monitor, transaminases are currently stable and mildly elevated

## 2018-02-16 NOTE — PROGRESS NOTE ADULT - PROBLEM SELECTOR PLAN 5
- creatinine rise again today in the setting of IV lasix use for the past several days   - renally dose meds  - avoid nephrotoxic agents - creatinine stable today in the setting of diuresis   - renally dose meds  - avoid nephrotoxic agents

## 2018-02-16 NOTE — PROGRESS NOTE ADULT - SUBJECTIVE AND OBJECTIVE BOX
Patient is a 89y old  Female who presents with a chief complaint of fall and altered mental status (2018 18:54)        SUBJECTIVE / OVERNIGHT EVENTS:      MEDICATIONS  (STANDING):  dextrose 5%. 1000 milliLiter(s) (50 mL/Hr) IV Continuous <Continuous>  dextrose 50% Injectable 12.5 Gram(s) IV Push once  dextrose 50% Injectable 25 Gram(s) IV Push once  dextrose 50% Injectable 25 Gram(s) IV Push once  furosemide   Injectable 40 milliGRAM(s) IV Push two times a day  insulin glargine Injectable (LANTUS) 2 Unit(s) SubCutaneous at bedtime  insulin lispro (HumaLOG) corrective regimen sliding scale   SubCutaneous three times a day before meals  insulin lispro (HumaLOG) corrective regimen sliding scale   SubCutaneous at bedtime  insulin lispro Injectable (HumaLOG) 2 Unit(s) SubCutaneous three times a day before meals  lacosamide 150 milliGRAM(s) Oral daily  levETIRAcetam 500 milliGRAM(s) Oral two times a day  piperacillin/tazobactam IVPB. 3.375 Gram(s) IV Intermittent every 8 hours  vancomycin  IVPB 1000 milliGRAM(s) IV Intermittent every 24 hours    MEDICATIONS  (PRN):  acetaminophen   Tablet. 650 milliGRAM(s) Oral every 6 hours PRN mild to moderate pain  aluminum hydroxide/magnesium hydroxide/simethicone Suspension 30 milliLiter(s) Oral every 4 hours PRN Dyspepsia  dextrose Gel 1 Dose(s) Oral once PRN Blood Glucose LESS THAN 70 milliGRAM(s)/deciliter  glucagon  Injectable 1 milliGRAM(s) IntraMuscular once PRN Glucose LESS THAN 70 milligrams/deciliter        CAPILLARY BLOOD GLUCOSE      POCT Blood Glucose.: 238 mg/dL (2018 07:35)  POCT Blood Glucose.: 223 mg/dL (15 Feb 2018 21:10)  POCT Blood Glucose.: 200 mg/dL (15 Feb 2018 17:35)  POCT Blood Glucose.: 251 mg/dL (15 Feb 2018 11:39)    I&O's Summary    15 Feb 2018 07:01  -  2018 07:00  --------------------------------------------------------  IN: 830 mL / OUT: 200 mL / NET: 630 mL        PHYSICAL EXAM  GENERAL: NAD, well-developed  HEAD:  Atraumatic, Normocephalic  EYES: EOMI, PERRLA, conjunctiva and sclera clear  NECK: Supple, No JVD  CHEST/LUNG: Clear to auscultation bilaterally; No wheeze  HEART: Regular rate and rhythm; No murmurs, rubs, or gallops  ABDOMEN: Soft, Nontender, Nondistended; Bowel sounds present  EXTREMITIES:  2+ Peripheral Pulses, No clubbing, cyanosis, or edema  PSYCH: AAOx3  SKIN: No rashes or lesions    LABS:                        11.1   6.32  )-----------( 185      ( 15 Feb 2018 07:29 )             35.6     02-15    139  |  98  |  33<H>  ----------------------------<  250<H>  4.8   |  30  |  1.25    Ca    9.5      15 Feb 2018 07:36  Phos  3.1     02-15  Mg     2.6     02-15    TPro  7.2  /  Alb  3.1<L>  /  TBili  0.7  /  DBili  x   /  AST  32  /  ALT  44  /  AlkPhos  142<H>  15      CARDIAC MARKERS ( 2018 11:51 )  x     / 0.02 ng/mL / 17 U/L / x     / 1.0 ng/mL      Urinalysis Basic - ( 15 Feb 2018 03:37 )    Color: Yellow / Appearance: Clear / S.016 / pH: x  Gluc: x / Ketone: Negative  / Bili: Negative / Urobili: Negative   Blood: x / Protein: Trace / Nitrite: Negative   Leuk Esterase: Small / RBC: 0-2 /HPF / WBC 5-10 /HPF   Sq Epi: x / Non Sq Epi: Occasional /HPF / Bacteria: x        RADIOLOGY & ADDITIONAL TESTS:    Imaging Personally Reviewed:  Consultant(s) Notes Reviewed:    Care Discussed with Consultants/Other Providers: Patient is a 89y old  Female who presents with a chief complaint of fall and altered mental status (2018 18:54)    SUBJECTIVE / OVERNIGHT EVENTS:  No acute issues or concerns overnight.     MEDICATIONS  (STANDING):  dextrose 5%. 1000 milliLiter(s) (50 mL/Hr) IV Continuous <Continuous>  dextrose 50% Injectable 12.5 Gram(s) IV Push once  dextrose 50% Injectable 25 Gram(s) IV Push once  dextrose 50% Injectable 25 Gram(s) IV Push once  furosemide   Injectable 40 milliGRAM(s) IV Push two times a day  insulin glargine Injectable (LANTUS) 2 Unit(s) SubCutaneous at bedtime  insulin lispro (HumaLOG) corrective regimen sliding scale   SubCutaneous three times a day before meals  insulin lispro (HumaLOG) corrective regimen sliding scale   SubCutaneous at bedtime  insulin lispro Injectable (HumaLOG) 2 Unit(s) SubCutaneous three times a day before meals  lacosamide 150 milliGRAM(s) Oral daily  levETIRAcetam 500 milliGRAM(s) Oral two times a day  piperacillin/tazobactam IVPB. 3.375 Gram(s) IV Intermittent every 8 hours  vancomycin  IVPB 1000 milliGRAM(s) IV Intermittent every 24 hours    MEDICATIONS  (PRN):  acetaminophen   Tablet. 650 milliGRAM(s) Oral every 6 hours PRN mild to moderate pain  aluminum hydroxide/magnesium hydroxide/simethicone Suspension 30 milliLiter(s) Oral every 4 hours PRN Dyspepsia  dextrose Gel 1 Dose(s) Oral once PRN Blood Glucose LESS THAN 70 milliGRAM(s)/deciliter  glucagon  Injectable 1 milliGRAM(s) IntraMuscular once PRN Glucose LESS THAN 70 milligrams/deciliter    Vital Signs Last 24 Hrs  T(C): 37 (2018 04:25), Max: 37.2 (15 Feb 2018 20:26)  T(F): 98.6 (2018 04:25), Max: 98.9 (15 Feb 2018 20:26)  HR: 56 (2018 04:25) (53 - 56)  BP: 158/60 (2018 04:59) (137/57 - 173/72)  BP(mean): --  RR: 19 (2018 04:25) (18 - 19)  SpO2: 96% (2018 04:25) (93% - 96%)    CAPILLARY BLOOD GLUCOSE  POCT Blood Glucose.: 238 mg/dL (2018 07:35)  POCT Blood Glucose.: 223 mg/dL (15 Feb 2018 21:10)  POCT Blood Glucose.: 200 mg/dL (15 Feb 2018 17:35)  POCT Blood Glucose.: 251 mg/dL (15 Feb 2018 11:39)    I&O's Summary    15 Feb 2018 07:01  -  2018 07:00  --------------------------------------------------------  IN: 830 mL / OUT: 200 mL / NET: 630 mL    PHYSICAL EXAM  General: elderly frail woman. NAD on 5 L NC    HEENT: PERRL, Sclera anicteric   Cardiovascular: Bradycardic with irregular rhythm, RITESH 4/6  No rubs or gallops  Lungs: Off oxygen. Note of some abdominal breathing on exam. Crackles appreciated diffusely and bilaterally to the mid lung    Abdomen: Soft and distended. Bowel sounds present. Nontender to palpation. No organomegaly   Extremities: 2+ pitting edema to the mid shin. No cyanosis and extremities otherwise well perfused.  Neuro: AAO x 2.   Skin: No skin changes noted   LABS:                        11.1   6.32  )-----------( 185      ( 15 Feb 2018 07:29 )             35.6     02-15    139  |  98  |  33<H>  ----------------------------<  250<H>  4.8   |  30  |  1.25    Ca    9.5      15 Feb 2018 07:36  Phos  3.1     02-15  Mg     2.6     02-15    TPro  7.2  /  Alb  3.1<L>  /  TBili  0.7  /  DBili  x   /  AST  32  /  ALT  44  /  AlkPhos  142<H>  02-15      CARDIAC MARKERS ( 2018 11:51 )  x     / 0.02 ng/mL / 17 U/L / x     / 1.0 ng/mL      Urinalysis Basic - ( 15 Feb 2018 03:37 )    Color: Yellow / Appearance: Clear / S.016 / pH: x  Gluc: x / Ketone: Negative  / Bili: Negative / Urobili: Negative   Blood: x / Protein: Trace / Nitrite: Negative   Leuk Esterase: Small / RBC: 0-2 /HPF / WBC 5-10 /HPF   Sq Epi: x / Non Sq Epi: Occasional /HPF / Bacteria: x    RADIOLOGY & ADDITIONAL TESTS:  < from: [facility] Duplex Lower Ext Vein Scan, Bilat (02.15.18 @ 17:07) >  FINDINGS:    There is normal compressibility of the bilateral common femoral, femoral   and popliteal veins. No calf vein thrombosis is detected.    Doppler examination shows normal spontaneous and phasic flow.    IMPRESSION:     No evidence of bilateral lower extremity deep venous thrombosis.    < end of copied text >    Imaging Personally Reviewed:  Consultant(s) Notes Reviewed:    Care Discussed with Consultants/Other Providers:

## 2018-02-17 DIAGNOSIS — R06.02 SHORTNESS OF BREATH: ICD-10-CM

## 2018-02-17 LAB
ALBUMIN SERPL ELPH-MCNC: 2.7 G/DL — LOW (ref 3.3–5)
ALP SERPL-CCNC: 143 U/L — HIGH (ref 40–120)
ALT FLD-CCNC: 64 U/L — HIGH (ref 10–45)
ANION GAP SERPL CALC-SCNC: 12 MMOL/L — SIGNIFICANT CHANGE UP (ref 5–17)
AST SERPL-CCNC: 56 U/L — HIGH (ref 10–40)
BILIRUB SERPL-MCNC: 0.5 MG/DL — SIGNIFICANT CHANGE UP (ref 0.2–1.2)
BUN SERPL-MCNC: 27 MG/DL — HIGH (ref 7–23)
CALCIUM SERPL-MCNC: 10.4 MG/DL — SIGNIFICANT CHANGE UP (ref 8.4–10.5)
CHLORIDE SERPL-SCNC: 102 MMOL/L — SIGNIFICANT CHANGE UP (ref 96–108)
CO2 SERPL-SCNC: 30 MMOL/L — SIGNIFICANT CHANGE UP (ref 22–31)
CREAT SERPL-MCNC: 1.14 MG/DL — SIGNIFICANT CHANGE UP (ref 0.5–1.3)
GLUCOSE BLDC GLUCOMTR-MCNC: 151 MG/DL — HIGH (ref 70–99)
GLUCOSE BLDC GLUCOMTR-MCNC: 183 MG/DL — HIGH (ref 70–99)
GLUCOSE BLDC GLUCOMTR-MCNC: 204 MG/DL — HIGH (ref 70–99)
GLUCOSE BLDC GLUCOMTR-MCNC: 219 MG/DL — HIGH (ref 70–99)
GLUCOSE SERPL-MCNC: 170 MG/DL — HIGH (ref 70–99)
HCT VFR BLD CALC: 32.5 % — LOW (ref 34.5–45)
HGB BLD-MCNC: 10.1 G/DL — LOW (ref 11.5–15.5)
MAGNESIUM SERPL-MCNC: 2.1 MG/DL — SIGNIFICANT CHANGE UP (ref 1.6–2.6)
MCHC RBC-ENTMCNC: 29.8 PG — SIGNIFICANT CHANGE UP (ref 27–34)
MCHC RBC-ENTMCNC: 31.1 GM/DL — LOW (ref 32–36)
MCV RBC AUTO: 95.9 FL — SIGNIFICANT CHANGE UP (ref 80–100)
PHOSPHATE SERPL-MCNC: 3.2 MG/DL — SIGNIFICANT CHANGE UP (ref 2.5–4.5)
PLATELET # BLD AUTO: 168 K/UL — SIGNIFICANT CHANGE UP (ref 150–400)
POTASSIUM SERPL-MCNC: 4.3 MMOL/L — SIGNIFICANT CHANGE UP (ref 3.5–5.3)
POTASSIUM SERPL-SCNC: 4.3 MMOL/L — SIGNIFICANT CHANGE UP (ref 3.5–5.3)
PROT SERPL-MCNC: 6.6 G/DL — SIGNIFICANT CHANGE UP (ref 6–8.3)
RBC # BLD: 3.39 M/UL — LOW (ref 3.8–5.2)
RBC # FLD: 18.1 % — HIGH (ref 10.3–14.5)
SODIUM SERPL-SCNC: 144 MMOL/L — SIGNIFICANT CHANGE UP (ref 135–145)
WBC # BLD: 3.96 K/UL — SIGNIFICANT CHANGE UP (ref 3.8–10.5)
WBC # FLD AUTO: 3.96 K/UL — SIGNIFICANT CHANGE UP (ref 3.8–10.5)

## 2018-02-17 PROCEDURE — 99233 SBSQ HOSP IP/OBS HIGH 50: CPT | Mod: GC

## 2018-02-17 RX ORDER — FUROSEMIDE 40 MG
60 TABLET ORAL
Qty: 0 | Refills: 0 | Status: DISCONTINUED | OUTPATIENT
Start: 2018-02-17 | End: 2018-02-18

## 2018-02-17 RX ADMIN — Medication 2: at 07:45

## 2018-02-17 RX ADMIN — Medication 4: at 12:07

## 2018-02-17 RX ADMIN — PIPERACILLIN AND TAZOBACTAM 25 GRAM(S): 4; .5 INJECTION, POWDER, LYOPHILIZED, FOR SOLUTION INTRAVENOUS at 17:00

## 2018-02-17 RX ADMIN — Medication 40 MILLIGRAM(S): at 05:13

## 2018-02-17 RX ADMIN — Medication 2: at 18:16

## 2018-02-17 RX ADMIN — INSULIN GLARGINE 8 UNIT(S): 100 INJECTION, SOLUTION SUBCUTANEOUS at 21:49

## 2018-02-17 RX ADMIN — Medication 5 UNIT(S): at 12:06

## 2018-02-17 RX ADMIN — LACOSAMIDE 150 MILLIGRAM(S): 50 TABLET ORAL at 18:15

## 2018-02-17 RX ADMIN — Medication 5 UNIT(S): at 18:15

## 2018-02-17 RX ADMIN — LACOSAMIDE 150 MILLIGRAM(S): 50 TABLET ORAL at 05:13

## 2018-02-17 RX ADMIN — Medication 5 UNIT(S): at 07:44

## 2018-02-17 RX ADMIN — PIPERACILLIN AND TAZOBACTAM 25 GRAM(S): 4; .5 INJECTION, POWDER, LYOPHILIZED, FOR SOLUTION INTRAVENOUS at 08:00

## 2018-02-17 RX ADMIN — PIPERACILLIN AND TAZOBACTAM 25 GRAM(S): 4; .5 INJECTION, POWDER, LYOPHILIZED, FOR SOLUTION INTRAVENOUS at 23:16

## 2018-02-17 RX ADMIN — Medication 60 MILLIGRAM(S): at 18:16

## 2018-02-17 RX ADMIN — Medication 30 MILLILITER(S): at 10:19

## 2018-02-17 RX ADMIN — Medication 250 MILLIGRAM(S): at 12:07

## 2018-02-17 NOTE — PROGRESS NOTE ADULT - PROBLEM SELECTOR PLAN 3
- improved, AAO x 2 and mentating well, etiology secondary to metabolic causes now resolved   - s/p fall with CTH not showing any evidence of acute ICH, and with decreased overall left subdural collection  -c/b dementia and advanced age

## 2018-02-17 NOTE — PROGRESS NOTE ADULT - PROBLEM SELECTOR PLAN 2
- likely 2/2 volume overload with right sided pleural effusion  - family refusing betancourt for I/O -- > given blood pressures have room to go up to 60 IV BID lasix from 40 IV BID

## 2018-02-17 NOTE — PROGRESS NOTE ADULT - PROBLEM SELECTOR PLAN 7
- etiology initially thought to be 2/2 congestive hepatopathy   - RUQ U/S not revealing of stones or liver pathology and exam unremarkable   - will continue to monitor, transaminases are currently stable and mildly elevated

## 2018-02-17 NOTE — PROGRESS NOTE ADULT - PROBLEM SELECTOR PLAN 9
- As per conversation with both daughters - - patient is FULL CODE  - DVT PPx: SCDs     Dispo: Will increase to 60 IV BID lasix. RUQ U/S not suggestive of liver pathology or stones to explain transaminitis - - likely congestive hepatopathy. Will continue vanc/zosyn with plan to discontinue after tomorrow's dose. D/C likely early next week.     Anneliese Robles  PGY1  [phone]

## 2018-02-17 NOTE — PROGRESS NOTE ADULT - PROBLEM SELECTOR PLAN 1
- spiked a fever on 2/14 with no found source: UA not convincing for UTI and pt s/p 3 day course of CTX, CXR: no focal consolidation, B/C from 2/14 NTD  - RUQ U/S obtained in setting of transaminitis with no evidence of stones, and no tenderness to suggest cholecystitis   - Day 4/5 of vancomycin/zosyn -- will discontinue after tomorrow's dose  - patient could also be spiking fevers because of a PE in the setting of oxygen use but dopplers negative for DVT and patient not candidate for AC given SDH

## 2018-02-17 NOTE — PROGRESS NOTE ADULT - PROBLEM SELECTOR PLAN 5
- fall at nursing facility  with no reported loss of conciousness, as per daughter, has remained alert and oriented as per baseline  - CTH performed x 2, no evidence of worsening of the left subdural collection. No intervention planned by NSGY   - q4 neuro checks, fall precautions  - family wants patient to go home - - will go home with home PT services

## 2018-02-17 NOTE — PROGRESS NOTE ADULT - PROBLEM SELECTOR PLAN 8
- rates are in the 50s and has been off of beta blocker since last discharge, currently normotensive  - off of anticoagulation in setting of SDH

## 2018-02-17 NOTE — PROGRESS NOTE ADULT - SUBJECTIVE AND OBJECTIVE BOX
Patient is a 89y old  Female who presents with a chief complaint of fall and altered mental status (11 Feb 2018 18:54)    SUBJECTIVE / OVERNIGHT EVENTS:  Patient seen at bedside. No acute issues or concerns with daughter present at bedside. Slept throughout the night - - continues to use accessory muscle use but able to speak without interruption and mentating well.     MEDICATIONS  (STANDING):  dextrose 5%. 1000 milliLiter(s) (50 mL/Hr) IV Continuous <Continuous>  dextrose 50% Injectable 12.5 Gram(s) IV Push once  dextrose 50% Injectable 25 Gram(s) IV Push once  dextrose 50% Injectable 25 Gram(s) IV Push once  furosemide   Injectable 40 milliGRAM(s) IV Push two times a day  insulin glargine Injectable (LANTUS) 8 Unit(s) SubCutaneous at bedtime  insulin lispro (HumaLOG) corrective regimen sliding scale   SubCutaneous three times a day before meals  insulin lispro (HumaLOG) corrective regimen sliding scale   SubCutaneous at bedtime  insulin lispro Injectable (HumaLOG) 5 Unit(s) SubCutaneous three times a day before meals  lacosamide 150 milliGRAM(s) Oral two times a day  piperacillin/tazobactam IVPB. 3.375 Gram(s) IV Intermittent every 8 hours  vancomycin  IVPB 1000 milliGRAM(s) IV Intermittent every 24 hours    MEDICATIONS  (PRN):  acetaminophen   Tablet. 650 milliGRAM(s) Oral every 6 hours PRN mild to moderate pain  aluminum hydroxide/magnesium hydroxide/simethicone Suspension 30 milliLiter(s) Oral every 4 hours PRN Dyspepsia  dextrose Gel 1 Dose(s) Oral once PRN Blood Glucose LESS THAN 70 milliGRAM(s)/deciliter  glucagon  Injectable 1 milliGRAM(s) IntraMuscular once PRN Glucose LESS THAN 70 milligrams/deciliter    Vital Signs Last 24 Hrs  T(C): 36.7 (17 Feb 2018 04:28), Max: 36.7 (17 Feb 2018 04:28)  T(F): 98 (17 Feb 2018 04:28), Max: 98 (17 Feb 2018 04:28)  HR: 51 (17 Feb 2018 04:28) (50 - 67)  BP: 165/67 (17 Feb 2018 04:28) (121/64 - 165/67)  BP(mean): --  RR: 18 (17 Feb 2018 04:28) (17 - 18)  SpO2: 99% (17 Feb 2018 04:28) (91% - 99%)    CAPILLARY BLOOD GLUCOSE  POCT Blood Glucose.: 183 mg/dL (17 Feb 2018 07:25)  POCT Blood Glucose.: 211 mg/dL (16 Feb 2018 21:06)  POCT Blood Glucose.: 194 mg/dL (16 Feb 2018 16:31)  POCT Blood Glucose.: 295 mg/dL (16 Feb 2018 11:34)    I&O's Summary    16 Feb 2018 07:01  -  17 Feb 2018 07:00  --------------------------------------------------------  IN: 440 mL / OUT: 900 mL / NET: -460 mL    17 Feb 2018 07:01  -  17 Feb 2018 10:33  --------------------------------------------------------  IN: 120 mL / OUT: 0 mL / NET: 120 mL    PHYSICAL EXAM  General: elderly frail woman, sitting in bed - - NAD    HEENT: PERRL, Sclera anicteric   Cardiovascular: Bradycardic with irregular rhythm, RITESH 4/6  No rubs or gallops  Lungs: on 4L NC. Note of some abdominal breathing on exam. Crackles appreciated up to the mid lung R>L   Abdomen: Soft and distended. Bowel sounds present. Nontender to palpation. No organomegaly   Extremities: 2+ pitting edema to the mid shin. No cyanosis and extremities otherwise well perfused.  Neuro: AAO x 2.   Skin: No skin changes noted   LABS:                        10.1   3.96  )-----------( 168      ( 17 Feb 2018 08:54 )             32.5     02-17    144  |  102  |  27<H>  ----------------------------<  170<H>  4.3   |  30  |  1.14    Ca    10.4      17 Feb 2018 08:45  Phos  3.2     02-17  Mg     2.1     02-17    TPro  6.6  /  Alb  2.7<L>  /  TBili  0.5  /  DBili  x   /  AST  56<H>  /  ALT  64<H>  /  AlkPhos  143<H>  02-17    RADIOLOGY & ADDITIONAL TESTS:  < from: US Abdomen Upper Quadrant Right (02.16.18 @ 17:28) >  IMPRESSION:     Mildly thickened gallbladder wall with layering sludge. No   cholelithiasis. No gross pain upon scanning over the gallbladder.     Right pleural effusion.    < end of copied text >    Imaging Personally Reviewed:  Consultant(s) Notes Reviewed:    Care Discussed with Consultants/Other Providers:

## 2018-02-18 LAB
ALBUMIN SERPL ELPH-MCNC: 3 G/DL — LOW (ref 3.3–5)
ALP SERPL-CCNC: 170 U/L — HIGH (ref 40–120)
ALT FLD-CCNC: 66 U/L — HIGH (ref 10–45)
ANION GAP SERPL CALC-SCNC: 14 MMOL/L — SIGNIFICANT CHANGE UP (ref 5–17)
AST SERPL-CCNC: 54 U/L — HIGH (ref 10–40)
BILIRUB SERPL-MCNC: 0.5 MG/DL — SIGNIFICANT CHANGE UP (ref 0.2–1.2)
BUN SERPL-MCNC: 26 MG/DL — HIGH (ref 7–23)
CALCIUM SERPL-MCNC: 10.5 MG/DL — SIGNIFICANT CHANGE UP (ref 8.4–10.5)
CHLORIDE SERPL-SCNC: 98 MMOL/L — SIGNIFICANT CHANGE UP (ref 96–108)
CO2 SERPL-SCNC: 30 MMOL/L — SIGNIFICANT CHANGE UP (ref 22–31)
CREAT SERPL-MCNC: 1.16 MG/DL — SIGNIFICANT CHANGE UP (ref 0.5–1.3)
GLUCOSE BLDC GLUCOMTR-MCNC: 139 MG/DL — HIGH (ref 70–99)
GLUCOSE BLDC GLUCOMTR-MCNC: 140 MG/DL — HIGH (ref 70–99)
GLUCOSE BLDC GLUCOMTR-MCNC: 179 MG/DL — HIGH (ref 70–99)
GLUCOSE BLDC GLUCOMTR-MCNC: 230 MG/DL — HIGH (ref 70–99)
GLUCOSE BLDC GLUCOMTR-MCNC: 66 MG/DL — LOW (ref 70–99)
GLUCOSE BLDC GLUCOMTR-MCNC: 66 MG/DL — LOW (ref 70–99)
GLUCOSE BLDC GLUCOMTR-MCNC: 88 MG/DL — SIGNIFICANT CHANGE UP (ref 70–99)
GLUCOSE SERPL-MCNC: 212 MG/DL — HIGH (ref 70–99)
HCT VFR BLD CALC: 33.7 % — LOW (ref 34.5–45)
HGB BLD-MCNC: 10.5 G/DL — LOW (ref 11.5–15.5)
MAGNESIUM SERPL-MCNC: 2 MG/DL — SIGNIFICANT CHANGE UP (ref 1.6–2.6)
MCHC RBC-ENTMCNC: 29.7 PG — SIGNIFICANT CHANGE UP (ref 27–34)
MCHC RBC-ENTMCNC: 31.2 GM/DL — LOW (ref 32–36)
MCV RBC AUTO: 95.5 FL — SIGNIFICANT CHANGE UP (ref 80–100)
PHOSPHATE SERPL-MCNC: 3.1 MG/DL — SIGNIFICANT CHANGE UP (ref 2.5–4.5)
PLATELET # BLD AUTO: 184 K/UL — SIGNIFICANT CHANGE UP (ref 150–400)
POTASSIUM SERPL-MCNC: 4 MMOL/L — SIGNIFICANT CHANGE UP (ref 3.5–5.3)
POTASSIUM SERPL-SCNC: 4 MMOL/L — SIGNIFICANT CHANGE UP (ref 3.5–5.3)
PROT SERPL-MCNC: 6.9 G/DL — SIGNIFICANT CHANGE UP (ref 6–8.3)
RBC # BLD: 3.53 M/UL — LOW (ref 3.8–5.2)
RBC # FLD: 17.9 % — HIGH (ref 10.3–14.5)
SODIUM SERPL-SCNC: 142 MMOL/L — SIGNIFICANT CHANGE UP (ref 135–145)
WBC # BLD: 4.27 K/UL — SIGNIFICANT CHANGE UP (ref 3.8–10.5)
WBC # FLD AUTO: 4.27 K/UL — SIGNIFICANT CHANGE UP (ref 3.8–10.5)

## 2018-02-18 PROCEDURE — 99233 SBSQ HOSP IP/OBS HIGH 50: CPT | Mod: GC

## 2018-02-18 PROCEDURE — 93010 ELECTROCARDIOGRAM REPORT: CPT

## 2018-02-18 RX ORDER — FUROSEMIDE 40 MG
80 TABLET ORAL
Qty: 0 | Refills: 0 | Status: DISCONTINUED | OUTPATIENT
Start: 2018-02-18 | End: 2018-02-21

## 2018-02-18 RX ORDER — DEXTROSE 50 % IN WATER 50 %
1 SYRINGE (ML) INTRAVENOUS ONCE
Qty: 0 | Refills: 0 | Status: COMPLETED | OUTPATIENT
Start: 2018-02-18 | End: 2018-02-18

## 2018-02-18 RX ORDER — DEXTROSE 50 % IN WATER 50 %
12.5 SYRINGE (ML) INTRAVENOUS ONCE
Qty: 0 | Refills: 0 | Status: COMPLETED | OUTPATIENT
Start: 2018-02-18 | End: 2018-02-18

## 2018-02-18 RX ORDER — ACETAMINOPHEN 500 MG
1000 TABLET ORAL ONCE
Qty: 0 | Refills: 0 | Status: COMPLETED | OUTPATIENT
Start: 2018-02-18 | End: 2018-02-18

## 2018-02-18 RX ADMIN — Medication 12.5 GRAM(S): at 17:17

## 2018-02-18 RX ADMIN — PIPERACILLIN AND TAZOBACTAM 25 GRAM(S): 4; .5 INJECTION, POWDER, LYOPHILIZED, FOR SOLUTION INTRAVENOUS at 19:00

## 2018-02-18 RX ADMIN — LACOSAMIDE 150 MILLIGRAM(S): 50 TABLET ORAL at 19:43

## 2018-02-18 RX ADMIN — Medication 1000 MILLIGRAM(S): at 04:00

## 2018-02-18 RX ADMIN — Medication 5 UNIT(S): at 12:34

## 2018-02-18 RX ADMIN — Medication 60 MILLIGRAM(S): at 04:48

## 2018-02-18 RX ADMIN — Medication 4: at 08:20

## 2018-02-18 RX ADMIN — Medication 30 MILLILITER(S): at 03:01

## 2018-02-18 RX ADMIN — Medication 80 MILLIGRAM(S): at 18:00

## 2018-02-18 RX ADMIN — Medication 1 DOSE(S): at 16:16

## 2018-02-18 RX ADMIN — LACOSAMIDE 150 MILLIGRAM(S): 50 TABLET ORAL at 05:11

## 2018-02-18 RX ADMIN — Medication 400 MILLIGRAM(S): at 03:17

## 2018-02-18 RX ADMIN — Medication 5 UNIT(S): at 08:19

## 2018-02-18 RX ADMIN — PIPERACILLIN AND TAZOBACTAM 25 GRAM(S): 4; .5 INJECTION, POWDER, LYOPHILIZED, FOR SOLUTION INTRAVENOUS at 09:00

## 2018-02-18 RX ADMIN — INSULIN GLARGINE 8 UNIT(S): 100 INJECTION, SOLUTION SUBCUTANEOUS at 21:28

## 2018-02-18 NOTE — PROVIDER CONTACT NOTE (OTHER) - ACTION/TREATMENT ORDERED:
MD Alvarado made aware; per MD Alvarado-give lasix early. recheck BP in 1hr. Will continue to monitor

## 2018-02-18 NOTE — DISCHARGE NOTE ADULT - MEDICATION SUMMARY - MEDICATIONS TO TAKE
I will START or STAY ON the medications listed below when I get home from the hospital:    Wheelchair  -- Wheelchair with elevated leg rest for ambulation in and out of the house  Ht 5'0" Wt: 110lbs   Dx: CHF exacerbation  -- Indication: For gait disturbance    Hospital Bed   -- Hospital Bed   -- Indication: For Acute heart failure, unspecified heart failure type    [employer] Hospital Bed   -- [employer] Hospital Bed   Ht: 5'0 Wt 110 lbs  Diagnosis Subdural Hematoma I62.0  NPI: [id number]  -- Indication: For Acute heart failure, unspecified heart failure type    Standard Wheelchair  -- Standard Wheelchair with elevated leg rest for long distances and doctor visits   Dx: Subdural Hematoma I62.0   NPI: [id number]  -- Indication: For gait    acetaminophen 325 mg oral tablet  -- 2 tab(s) by mouth every 6 hours, As needed, Mild Pain (1 - 3)  -- Indication: For pain    lacosamide 150 mg oral tablet  -- 1 tab(s) by mouth once a day  -- Indication: For Seizures    Keppra  -- Indication: For Seizures    insulin glargine  -- 12 unit(s) subcutaneous once a day (at bedtime)  -- Indication: For diabetes    insulin lispro 100 units/mL subcutaneous solution  --  subcutaneous 4 times a day (before meals and at bedtime); 2 Unit(s) if Glucose 151 - 200  4 Unit(s) if Glucose 201 - 250  6 Unit(s) if Glucose 251 - 300  8 Unit(s) if Glucose 301 - 350  10 Unit(s) if Glucose 351 - 400  12 Unit(s) if Glucose Greater Than 400  -- Indication: For diabetes    insulin lispro 100 units/mL subcutaneous solution  --  subcutaneous once a day (at bedtime); 0 Unit(s) if Glucose 0 - 250  1 Unit(s) if Glucose 251 - 300  2 Unit(s) if Glucose 301 - 350  3 Unit(s) if Glucose 351 - 400  4 Unit(s) if Glucose Greater Than 400  -- Indication: For diabetes    Crestor 10 mg oral tablet  -- 1 tab(s) by mouth once a day (at bedtime)  -- Indication: For Hyperlipidemia    guaiFENesin 100 mg/5 mL oral liquid  -- 5 milliliter(s) by mouth every 6 hours, As needed, Cough  -- Indication: For cough    docusate sodium 100 mg oral capsule  -- 1 cap(s) by mouth once a day  -- Indication: For constipation    polyethylene glycol 3350 oral powder for reconstitution  -- 17 gram(s) by mouth every 12 hours  -- Indication: For constipation    senna oral tablet  -- 2 tab(s) by mouth once a day (at bedtime)  -- Indication: For constipation I will START or STAY ON the medications listed below when I get home from the hospital:    Wheelchair  -- Wheelchair with elevated leg rest for ambulation in and out of the house  Ht 5'0" Wt: 110lbs   Dx: CHF exacerbation  -- Indication: For Gait disturbance    Hospital Bed   -- Hospital Bed   -- Indication: For Acute heart failure, unspecified heart failure type    [employer] Hospital Bed   -- [employer] Hospital Bed   Ht: 5'0 Wt 110 lbs  Diagnosis Subdural Hematoma I62.0  NPI: [id number]  -- Indication: For Acute heart failure, unspecified heart failure type    Standard Wheelchair  -- Standard Wheelchair with elevated leg rest for long distances and doctor visits   Dx: Subdural Hematoma I62.0   NPI: [id number]  -- Indication: For Gait disturbance    acetaminophen 325 mg oral tablet  -- 2 tab(s) by mouth every 6 hours, As needed, Mild Pain (1 - 3)  -- Indication: For pain    losartan 100 mg oral tablet  -- 1 tab(s) by mouth once a day  -- Indication: For Hypertension    lacosamide 150 mg oral tablet  -- 1 tab(s) by mouth once a day  -- Indication: For Seizures    Keppra  -- Indication: For Seizures    insulin glargine  -- 12 unit(s) subcutaneous once a day (at bedtime)  -- Indication: For diabetes    insulin lispro 100 units/mL subcutaneous solution  --  subcutaneous 4 times a day (before meals and at bedtime); 2 Unit(s) if Glucose 151 - 200  4 Unit(s) if Glucose 201 - 250  6 Unit(s) if Glucose 251 - 300  8 Unit(s) if Glucose 301 - 350  10 Unit(s) if Glucose 351 - 400  12 Unit(s) if Glucose Greater Than 400  -- Indication: For diabetes    insulin lispro 100 units/mL subcutaneous solution  --  subcutaneous once a day (at bedtime); 0 Unit(s) if Glucose 0 - 250  1 Unit(s) if Glucose 251 - 300  2 Unit(s) if Glucose 301 - 350  3 Unit(s) if Glucose 351 - 400  4 Unit(s) if Glucose Greater Than 400  -- Indication: For diabetes    Crestor 10 mg oral tablet  -- 1 tab(s) by mouth once a day (at bedtime)  -- Indication: For Hyperlipidemia    furosemide 80 mg oral tablet  -- 1 tab(s) by mouth 2 times a day  -- Indication: For Acute heart failure, unspecified heart failure type    guaiFENesin 100 mg/5 mL oral liquid  -- 5 milliliter(s) by mouth every 6 hours, As needed, Cough  -- Indication: For cough    docusate sodium 100 mg oral capsule  -- 1 cap(s) by mouth once a day  -- Indication: For constipation    polyethylene glycol 3350 oral powder for reconstitution  -- 17 gram(s) by mouth every 12 hours  -- Indication: For constipation    senna oral tablet  -- 2 tab(s) by mouth once a day (at bedtime)  -- Indication: For constipation

## 2018-02-18 NOTE — DISCHARGE NOTE ADULT - PROVIDER TOKENS
TOKEN:'[id number]',TOKEN:'[id number]' TOKEN:'[id number]:'[id number]'

## 2018-02-18 NOTE — PROVIDER CONTACT NOTE (OTHER) - ASSESSMENT
pt Aox2, T 98.4; /70; HR 50; RR 18 O2 95% with 4L NC. pt c/o of pain to midsternal/ epigastric area; non-radiant. 10/10. Pt states she experienced this pain "days ago".

## 2018-02-18 NOTE — DISCHARGE NOTE ADULT - CARE PROVIDERS DIRECT ADDRESSES
,DirectAddress_Unknown,[email or web address] ,DirectAddress_Unknown,[email or web address]

## 2018-02-18 NOTE — PROGRESS NOTE ADULT - PROBLEM SELECTOR PLAN 1
- spiked a fever on 2/14 with no found source: UA not convincing for UTI and pt s/p 3 day course of CTX, CXR: no focal consolidation, B/C from 2/14 NTD  - RUQ U/S obtained in setting of transaminitis with no evidence of stones, and no tenderness to suggest cholecystitis, however mild thickening and gallbladder sludge. Pt on zosyn already however, and currently not surgical candidate given volume overload  - Day 5/5 of vancomycin/zosyn -- would d/c vanco and c/w zosyn, currently day 5  - patient could also be spiking fevers because of a PE in the setting of oxygen use but dopplers negative for DVT and patient not candidate for AC given SDH

## 2018-02-18 NOTE — DISCHARGE NOTE ADULT - ADDITIONAL INSTRUCTIONS
1) Please follow up with Dr. Nissenbaum, your neurologist regarding tapering off the lacosamide within 1 to 2 weeks of leaving the hospital  2) Please follow up with your electrophysiologist doctors, regarding your slow heart rate. Their contact information will be provided below 1) Please follow up with your cardiologist regarding medical management of heart failure within one week.   2) Please follow up with Dr. Nissenbaum, your neurologist regarding tapering off the lacosamide within 1 to 2 weeks of leaving the hospital  3) Please follow up with your electrophysiologist doctors, regarding your slow heart rate. Their contact information will be provided below.

## 2018-02-18 NOTE — PROVIDER CONTACT NOTE (OTHER) - ACTION/TREATMENT ORDERED:
MD Alvarado made aware; per MD Alvarado- EKG, trops with AM labs; maalox; and IV Tylenol as ordered. Will continue to monitor.

## 2018-02-18 NOTE — DISCHARGE NOTE ADULT - MEDICATION SUMMARY - MEDICATIONS TO STOP TAKING
I will STOP taking the medications listed below when I get home from the hospital:    amLODIPine 10 mg oral tablet  -- 1 tab(s) by mouth once a day I will STOP taking the medications listed below when I get home from the hospital:    insulin lispro 100 units/mL subcutaneous solution  --  subcutaneous 4 times a day (before meals and at bedtime); 2 Unit(s) if Glucose 151 - 200  4 Unit(s) if Glucose 201 - 250  6 Unit(s) if Glucose 251 - 300  8 Unit(s) if Glucose 301 - 350  10 Unit(s) if Glucose 351 - 400  12 Unit(s) if Glucose Greater Than 400    insulin lispro 100 units/mL subcutaneous solution  --  subcutaneous once a day (at bedtime); 0 Unit(s) if Glucose 0 - 250  1 Unit(s) if Glucose 251 - 300  2 Unit(s) if Glucose 301 - 350  3 Unit(s) if Glucose 351 - 400  4 Unit(s) if Glucose Greater Than 400    amLODIPine 10 mg oral tablet  -- 1 tab(s) by mouth once a day

## 2018-02-18 NOTE — DISCHARGE NOTE ADULT - HOSPITAL COURSE
89 year old female with PMHx of A-fib not on AC 2/2 SDH , T2DM, cervical cancer s/p hysterectomy and recent fall with SDH and a recent admission for flu and underlying bacterial PNA [facility]. In the ED: Afebrile, bradycardic with A-fib and SVR into the 30s-40s with otherwise normal blood pressures, tachypneic into the low 20s, requiring supplemental oxygen. Work up was as follows: CBC revealing mild anemia, with otherwise normal WBC, BMP revealing for hyponatremia of 126, Creatinine of 1.75 and mild transaminitis with ALP of 146. CTH obtained showing overall decreased in the Left subdural collection. Neurosurgery consulted in setting of fall and with repeat CTH not showing worsening collection, recommended no acute intervention performed. EP consulted for bradycardia with A-fib in SVR with no emergent intervention offered given hemodynamic stability. MICU consulted for tachypnea and encephalopathy with concern for need to intubate but ABG performed did not reveal CO2 retention with recommendation for medical management. In the ED given CTX x 1 for found urinary tract infection and 1L NS bolus.    On the medical floor patient was diuresed with resolution of her hypernatremia and improvement in mental status. Transaminitis started improving, presumed to be 2/2 congestive hepatopathy. Patient had completed 3 day course of CTX and seen by PT with preference by family to take patient home with home PT services. After finishing a course of antibiotics, patient had spiked a high grade fever with an increased in oxygen requirements required. UA negative, RVP nondetected and CXR showing new bilateral effusions. Dopplers of LE obtained to assess for PE and no evidence of DVT seen on ultrasound. Patient started on IV diuretics with family refusing betancourt placement to see output. Given worsening transaminitis - - RUQ U/S performed to assess for cholecystitis with no evidence of stones but evidence of gallbladder sludge noted on imaging. Patient had completed a seven day course of antibiotics with plan for _________________ 89 year old female with PMHx of A-fib not on AC 2/2 SDH , T2DM, cervical cancer s/p hysterectomy and recent fall with SDH and a recent admission for flu and underlying bacterial PNA who is BIBEMS from [facility] rehab for a fall. In the ED: Afebrile, bradycardic with A-fib and SVR into the 30s-40s with otherwise normal blood pressures, tachypneic into the low 20s, requiring supplemental oxygen. Work up was as follows: CBC revealing mild anemia, with otherwise normal WBC, BMP revealing for hyponatremia of 126, Creatinine of 1.75 and mild transaminitis with ALP of 146. CTH obtained showing overall decreased in the Left subdural collection. Neurosurgery consulted in setting of fall and with repeat CTH not showing worsening collection, recommended no acute intervention performed. EP consulted for bradycardia with A-fib in SVR with no emergent intervention offered given hemodynamic stability. MICU consulted for tachypnea and encephalopathy with concern for need to intubate but ABG performed did not reveal CO2 retention with recommendation for medical management. In the ED given CTX x 1 for found urinary tract infection and 1L NS bolus.    On the medical floor patient was diuresed with resolution of her hypernatremia and improvement in mental status. Transaminitis started improving, presumed to be 2/2 congestive hepatopathy. Patient had completed 3 day course of CTX and seen by PT with preference by family to take patient home with home PT services. After finishing a course of antibiotics, patient had spiked a high grade fever with an increased in oxygen requirements required. UA negative, RVP nondetected and CXR showing new bilateral effusions. Dopplers of LE obtained to assess for PE and no evidence of DVT seen on ultrasound. Patient started on IV diuretics with family refusing betancourt placement to see output. Given worsening transaminitis - - RUQ U/S performed to assess for cholecystitis with no evidence of stones but evidence of gallbladder sludge noted on imaging. Patient had completed a seven day course of antibiotics.  Liver enzymes normalized.    Patient was also fluid overloaded and requiring increasing oxygen. Patient received an TTE that showed severe aortic stenosis and EF 60%. Cardiology was consulted for acute decompensated heart failure 2/2 to aortic stenosis.  Patient was continued on lasix 80 IV BID without improvement.  Patient is not a candidate for TAVR as she is a poor surgical candidate 2/2 to subdural hematoma. Plan per cardiology is medical management.      Patient stable for discharge with home oxygen on 80 PO BID lasix.   Patient to follow up with neurologist, cardiologist, and electrophysiology.

## 2018-02-18 NOTE — PROGRESS NOTE ADULT - PROBLEM SELECTOR PLAN 9
- As per conversation with both daughters - - patient is FULL CODE  - DVT PPx: SCDs     Dispo: pending volume optimization. Per PT rehab, but pt prefers home with home PT.     Hiren Terrazas, PGY3  [phone]

## 2018-02-18 NOTE — PROGRESS NOTE ADULT - SUBJECTIVE AND OBJECTIVE BOX
Subjective  Pt was complaining of epigastric pain at night, that resolved with maalox. Pt EKG showed afib with slow ventricular response.       REVIEW OF SYSTEMS:  CONSTITUTIONAL: No fever, weight loss, or fatigue  EYES: No eye pain, visual disturbances, or discharge  ENMT:  No difficulty hearing, tinnitus, vertigo; No sinus or throat pain  NECK: No pain or stiffness  RESPIRATORY: No cough, wheezing, chills or hemoptysis; No shortness of breath  CARDIOVASCULAR: No chest pain, palpitations, dizziness  GASTROINTESTINAL: epigastric pain. No n/v/d.   GENITOURINARY: No dysuria, frequency, hematuria, or incontinence  NEUROLOGICAL: No headaches, memory loss, loss of strength, numbness, or tremors  SKIN: No itching, burning, rashes, or lesions   ENDOCRINE: No heat or cold intolerance; No hair loss  MUSCULOSKELETAL: No joint pain or swelling; No muscle, back, or extremity pain  PSYCHIATRIC: No depression, anxiety,  HEME/LYMPH: No easy bruising, or bleeding gums      VITAL SIGNS:  Vital Signs Last 24 Hrs  T(C): 36.9 (18 Feb 2018 04:32), Max: 36.9 (18 Feb 2018 02:44)  T(F): 98.5 (18 Feb 2018 04:32), Max: 98.5 (18 Feb 2018 04:32)  HR: 52 (18 Feb 2018 04:32) (50 - 53)  BP: 150/61 (18 Feb 2018 06:29) (124/54 - 188/70)  BP(mean): --  RR: 18 (18 Feb 2018 04:32) (18 - 19)  SpO2: 99% (18 Feb 2018 04:32) (95% - 100%)  I&O's Summary    17 Feb 2018 07:01  -  18 Feb 2018 07:00  --------------------------------------------------------  IN: 480 mL / OUT: 450 mL / NET: 30 mL    telemetry: afib with slow ventricular response, rate of 40s-50s        PHYSICAL EXAM:     GENERAL: no acute distress  HEENT: PERRLA, EOMI, moist oropharynx   RESPIRATORY: extensive crackles throughout lung fields  CARDIOVASCULAR: grade III systolic murmur heard throughout   ABDOMINAL: soft, non-tender, non-distended, positive bowel sounds   EXTREMITIES: no clubbing, cyanosis. +3 pitting edema b/l  NEUROLOGICAL: alert and oriented x 2, non-focal  SKIN: no rashes or lesions   MUSCULOSKELETAL: no gross joint deformity                          10.1   3.96  )-----------( 168      ( 17 Feb 2018 08:54 )             32.5     02-17    144  |  102  |  27<H>  ----------------------------<  170<H>  4.3   |  30  |  1.14    Ca    10.4      17 Feb 2018 08:45  Phos  3.2     02-17  Mg     2.1     02-17    TPro  6.6  /  Alb  2.7<L>  /  TBili  0.5  /  DBili  x   /  AST  56<H>  /  ALT  64<H>  /  AlkPhos  143<H>  02-17      CAPILLARY BLOOD GLUCOSE      POCT Blood Glucose.: 230 mg/dL (18 Feb 2018 07:31)  POCT Blood Glucose.: 204 mg/dL (17 Feb 2018 21:23)  POCT Blood Glucose.: 151 mg/dL (17 Feb 2018 16:26)  POCT Blood Glucose.: 219 mg/dL (17 Feb 2018 11:14)      MEDICATIONS  (STANDING):  dextrose 5%. 1000 milliLiter(s) (50 mL/Hr) IV Continuous <Continuous>  dextrose 50% Injectable 12.5 Gram(s) IV Push once  dextrose 50% Injectable 25 Gram(s) IV Push once  dextrose 50% Injectable 25 Gram(s) IV Push once  furosemide   Injectable 60 milliGRAM(s) IV Push two times a day  insulin glargine Injectable (LANTUS) 8 Unit(s) SubCutaneous at bedtime  insulin lispro (HumaLOG) corrective regimen sliding scale   SubCutaneous three times a day before meals  insulin lispro (HumaLOG) corrective regimen sliding scale   SubCutaneous at bedtime  insulin lispro Injectable (HumaLOG) 5 Unit(s) SubCutaneous three times a day before meals  lacosamide 150 milliGRAM(s) Oral two times a day  piperacillin/tazobactam IVPB. 3.375 Gram(s) IV Intermittent every 8 hours  vancomycin  IVPB 1000 milliGRAM(s) IV Intermittent every 24 hours    MEDICATIONS  (PRN):  acetaminophen   Tablet. 650 milliGRAM(s) Oral every 6 hours PRN mild to moderate pain  aluminum hydroxide/magnesium hydroxide/simethicone Suspension 30 milliLiter(s) Oral every 4 hours PRN Dyspepsia  aluminum hydroxide/magnesium hydroxide/simethicone Suspension 30 milliLiter(s) Oral every 6 hours PRN Dyspepsia  dextrose Gel 1 Dose(s) Oral once PRN Blood Glucose LESS THAN 70 milliGRAM(s)/deciliter  glucagon  Injectable 1 milliGRAM(s) IntraMuscular once PRN Glucose LESS THAN 70 milligrams/deciliter

## 2018-02-18 NOTE — CHART NOTE - NSCHARTNOTEFT_GEN_A_CORE
Called by nurse due to patient complaining of acute epigastric pain. On telemetry patient Afib 40-60s, with occasional PVCs. Patient seen at bedside accompanied by family member, patient endorsed pain in epigastric area 10/10, non radiating. As per family member, patient with hx of reflux. Patient endorsed having similar pain days ago.  On exam, VS T98.4, HR 50, /70 95% on NC, Cardiac Bradycardic, Irregular rhythm, no murmurs, Lungs- CTA, Abominal Normal bowel sounds, Abd - Tenderness to epigastric area, which reproduces the pain.   EKG- afib-with occasional PVCs, non-specific ST changes in V1 . Of note patient with elevated liver enzymes, and recent abd u/s showing thickened gallbladder. No bhatt's sign or dilation of CBD at that time.     Plan:   Iv acetaminophen for pain.   Maalox PRN for dyspesia/reflux given pt history.   BP increased likely 2/2 to pain. CTM   Troponin and CKMB with morning labs.

## 2018-02-18 NOTE — DISCHARGE NOTE ADULT - PATIENT PORTAL LINK FT
You can access the [facility] Patient Portal, offered by [facility], by registering with the following website: [email or web address]

## 2018-02-18 NOTE — DISCHARGE NOTE ADULT - CARE PLAN
Principal Discharge DX:	SDH (subdural hematoma)  Goal:	Management  Assessment and plan of treatment:	You had presented to the hospital after a fall at [facility]. Imaging was performed during this admission and did not show any increase in the bleed that you were known to have prior. You were seen by the neurosurgeons with no acute intervention planned or warranted. Please ensure that you are always moving around with a walker and with a family member around.  Secondary Diagnosis:	Hyponatremia  Goal:	Resolution  Assessment and plan of treatment:	On initial blood work, your sodium level was found to be low which likely caused your alteration in cognition. Once we had removed some of the excess fluid in your lungs, your levels have returned to baseline and your mentation had improved  Secondary Diagnosis:	Fever of unknown origin  Goal:	Treatment  Assessment and plan of treatment:	Initially, you were found to have a urinary tract infection for which you completed a course of antibiotics. Later on the in the admission, you had spiked a fever for a presumed gallbladder infection based on imaging. You had completed an additional 7 day course of antibiotics.  Secondary Diagnosis:	Elevated liver enzymes  Goal:	Management  Assessment and plan of treatment:	Blood work during this admission had revealed elevated liver enzymes. This was thought to be due to fluid overload which can sometimes cause back up of fluid into your liver and cause it to swell. For this reason - - we had started you on a water pill medication to get as much fluid off as possible.  Secondary Diagnosis:	Diabetes mellitus Principal Discharge DX:	Acute heart failure, unspecified heart failure type  Goal:	Management  Assessment and plan of treatment:	During this admission, you were diagnosed with severe aortic stenosis.  You severe aortic stenosis is causing acute decompensated heart failure.   Please continue to take lasix 80 orally twice a day as prescribed.  In addition, you will be short of breath and will require oxygen at home. Please use your oxygen daily.  Secondary Diagnosis:	Fever of unknown origin  Goal:	Resolution  Assessment and plan of treatment:	Initially, you were found to have a urinary tract infection for which you completed a course of antibiotics. Later on the in the admission, you had spiked a fever for a presumed gallbladder infection based on imaging. You had completed an additional 7 day course of antibiotics.  Secondary Diagnosis:	Elevated liver enzymes  Goal:	Treatment  Assessment and plan of treatment:	Blood work during this admission had revealed elevated liver enzymes. This was thought to be due to fluid overload which can sometimes cause back up of fluid into your liver and cause it to swell. For this reason - - we had started you on a water pill (lasix) medication to get as much fluid off as possible.  Secondary Diagnosis:	Aortic stenosis  Assessment and plan of treatment:	During this admission, you were diagnosed with severe aortic stenosis.  You severe aortic stenosis is causing acute decompensated heart failure.  Please continue to take lasix as prescribed and use oxygen everyday.  Secondary Diagnosis:	Subdural hematoma  Assessment and plan of treatment:	You had presented to the hospital after a fall at [facility] rehab. Imaging was performed during this admission and did not show any increase in the bleed that you were known to have prior. You were seen by the neurosurgeons with no acute intervention planned or warranted. Please ensure that you are always moving around with a walker and with a family member around. Principal Discharge DX:	Acute heart failure, unspecified heart failure type  Goal:	Management  Assessment and plan of treatment:	During this admission, you were diagnosed with severe aortic stenosis.  You severe aortic stenosis is causing acute decompensated heart failure.   Please continue to take lasix 80 orally twice a day as prescribed.  In addition, you will be short of breath and will require oxygen at home. Please use your oxygen daily.  Secondary Diagnosis:	Fever of unknown origin  Goal:	Resolution  Assessment and plan of treatment:	Initially, you were found to have a urinary tract infection for which you completed a course of antibiotics. Later on the in the admission, you had spiked a fever for a presumed gallbladder infection based on imaging. You had completed an additional 7 day course of antibiotics.  Secondary Diagnosis:	Elevated liver enzymes  Goal:	Resolved  Assessment and plan of treatment:	Blood work during this admission had revealed elevated liver enzymes. This was thought to be due to fluid overload which can sometimes cause back up of fluid into your liver and cause it to swell. For this reason - - we had started you on a water pill (lasix) medication to get as much fluid off as possible.  Secondary Diagnosis:	Aortic stenosis  Goal:	Follow up with your Cardiologist  Assessment and plan of treatment:	During this admission, you were diagnosed with severe aortic stenosis.  You severe aortic stenosis is causing acute decompensated heart failure.  Please continue to take Lasix as prescribed and use oxygen everyday.  Secondary Diagnosis:	Subdural hematoma  Goal:	Follow up with your primary medical doctor  Assessment and plan of treatment:	You had presented to the hospital after a fall at [facility] rehab. Imaging was performed during this admission and did not show any increase in the bleed that you were known to have prior. You were seen by the neurosurgeons with no acute intervention planned or warranted. Please ensure that you are always moving around with a walker and with a family member around.  Secondary Diagnosis:	Diabetes mellitus  Goal:	Please follow up with your primary medical doctor for further management of your diabetes  Assessment and plan of treatment:	Take insulin glargine (Lantus) and insulin lispro (Novolog) as prescribed. Please follow up with your primary medical doctor for further management of your diabetes and further adjustments to your insulin.

## 2018-02-18 NOTE — DISCHARGE NOTE ADULT - COMMUNITY RESOURCES
[facility] [phone] [facility] bed, wheelchair, portable O2, and Home  Concentrator

## 2018-02-18 NOTE — DISCHARGE NOTE ADULT - INSTRUCTIONS
Please maintain a low salt diet as this causes you to retain water and puts extra stress on your heart and kidneys

## 2018-02-18 NOTE — DISCHARGE NOTE ADULT - HOME CARE AGENCY
[facility] [phone] RN to provide skilled services, home with home P/T, HHA evaluation

## 2018-02-18 NOTE — DISCHARGE NOTE ADULT - PLAN OF CARE
Management [facility] Imaging was performed during this admission and did not show any increase in the bleed that you were known to have prior. You were seen by the neurosurgeons with no acute intervention planned or warranted. Please ensure that you are always moving around with a walker and with a family member around. Resolution On initial blood work, your sodium level was found to be low which likely caused your alteration in cognition. Once we had removed some of the excess fluid in your lungs, your levels have returned to baseline and your mentation had improved Treatment Initially, you were found to have a urinary tract infection for which you completed a course of antibiotics. Later on the in the admission, you had spiked a fever for a presumed gallbladder infection based on imaging. You had completed an additional 7 day course of antibiotics. Blood work during this admission had revealed elevated liver enzymes. This was thought to be due to fluid overload which can sometimes cause back up of fluid into your liver and cause it to swell. For this reason - - we had started you on a water pill medication to get as much fluid off as possible. During this admission, you were diagnosed with severe aortic stenosis.  You severe aortic stenosis is causing acute decompensated heart failure.   Please continue to take lasix 80 orally twice a day as prescribed.  In addition, you will be short of breath and will require oxygen at home. Please use your oxygen daily. Blood work during this admission had revealed elevated liver enzymes. This was thought to be due to fluid overload which can sometimes cause back up of fluid into your liver and cause it to swell. For this reason - - we had started you on a water pill (lasix) medication to get as much fluid off as possible. During this admission, you were diagnosed with severe aortic stenosis.  You severe aortic stenosis is causing acute decompensated heart failure.  Please continue to take lasix as prescribed and use oxygen everyday. [facility] Imaging was performed during this admission and did not show any increase in the bleed that you were known to have prior. You were seen by the neurosurgeons with no acute intervention planned or warranted. Please ensure that you are always moving around with a walker and with a family member around. Resolved Follow up with your Cardiologist During this admission, you were diagnosed with severe aortic stenosis.  You severe aortic stenosis is causing acute decompensated heart failure.  Please continue to take Lasix as prescribed and use oxygen everyday. Follow up with your primary medical doctor Please follow up with your primary medical doctor for further management of your diabetes Take insulin glargine (Lantus) and insulin lispro (Novolog) as prescribed. Please follow up with your primary medical doctor for further management of your diabetes and further adjustments to your insulin.

## 2018-02-18 NOTE — PROGRESS NOTE ADULT - ATTENDING COMMENTS
Unable to obtain accurate I/O as pt's family refusing betancourt. Need to ensure weights recorded daily.   Would increase Lasix to 80mg BID as pt still requiring supplemental O2  c/w Zosyn for 7 day course given Gallbladder mild wall thickening seen on US, discontinue Vanc

## 2018-02-18 NOTE — PROGRESS NOTE ADULT - ASSESSMENT
89 year old female with PMHx of A-fib not on AC 2/2 SDH, T2DM, HTN, and cervical cancer s/p hysterectomy who presents after a fall with encephalopathy from outside nursing facility, currently volume overload

## 2018-02-18 NOTE — DISCHARGE NOTE ADULT - CARE PROVIDER_API CALL
Nissenbaum, Michael A (MD), Neurology  [address]  [place] [address]  Phone: [phone]  Fax: [phone]    Claudia Avalos), Cardiac Electrophysiology; Cardiovascular Disease  [address], [place] [id number]  Phone: [phone]  Fax: [phone] Nissenbaum, Michael A (MD), Neurology  [address]  [place] [address]  Phone: [phone]  Fax: [phone]    Claudia Avalos), Cardiac Electrophysiology; Cardiovascular Disease  [address] [place], [place] [id number]  Phone: [phone]  Fax: [phone]    Marko Nix), Cardiovascular Disease; Internal Medicine; Nuclear Cardiology  [address]  [address] [place] [address]  Phone: [phone]  Fax: [phone]

## 2018-02-18 NOTE — PROGRESS NOTE ADULT - PROBLEM SELECTOR PLAN 2
- likely 2/2 volume overload with right sided pleural effusion  - family refusing betancourt for I/O -- > pt still volume overloaded, on 4 L NC. Volume increasing, net positive. Crackles throughout lung filed. Would consider going up on lasix given as bp tolerating.

## 2018-02-18 NOTE — DISCHARGE NOTE ADULT - SECONDARY DIAGNOSIS.
Hyponatremia Fever of unknown origin Elevated liver enzymes Diabetes mellitus Aortic stenosis Subdural hematoma

## 2018-02-18 NOTE — PROGRESS NOTE ADULT - PROBLEM SELECTOR PLAN 7
- etiology initially thought to be 2/2 congestive hepatopathy   - RUQ U/S not revealing of stones or liver pathology and exam unremarkable. However, sludge and mild gallbladder wall thickening.    - will continue to monitor, transaminases

## 2018-02-18 NOTE — DISCHARGE NOTE ADULT - MEDICATION SUMMARY - MEDICATIONS TO CHANGE
I will SWITCH the dose or number of times a day I take the medications listed below when I get home from the hospital:    losartan 50 mg oral tablet  -- 1 tab(s) by mouth 2 times a day    furosemide 20 mg oral tablet  -- 1 tab(s) by mouth once a day

## 2018-02-19 LAB
ALBUMIN SERPL ELPH-MCNC: 3 G/DL — LOW (ref 3.3–5)
ALP SERPL-CCNC: 141 U/L — HIGH (ref 40–120)
ALT FLD-CCNC: 58 U/L — HIGH (ref 10–45)
ANION GAP SERPL CALC-SCNC: 11 MMOL/L — SIGNIFICANT CHANGE UP (ref 5–17)
AST SERPL-CCNC: 46 U/L — HIGH (ref 10–40)
BILIRUB SERPL-MCNC: 0.5 MG/DL — SIGNIFICANT CHANGE UP (ref 0.2–1.2)
BUN SERPL-MCNC: 19 MG/DL — SIGNIFICANT CHANGE UP (ref 7–23)
CALCIUM SERPL-MCNC: 10.4 MG/DL — SIGNIFICANT CHANGE UP (ref 8.4–10.5)
CHLORIDE SERPL-SCNC: 100 MMOL/L — SIGNIFICANT CHANGE UP (ref 96–108)
CO2 SERPL-SCNC: 33 MMOL/L — HIGH (ref 22–31)
CREAT SERPL-MCNC: 1.09 MG/DL — SIGNIFICANT CHANGE UP (ref 0.5–1.3)
CULTURE RESULTS: SIGNIFICANT CHANGE UP
CULTURE RESULTS: SIGNIFICANT CHANGE UP
GLUCOSE BLDC GLUCOMTR-MCNC: 147 MG/DL — HIGH (ref 70–99)
GLUCOSE BLDC GLUCOMTR-MCNC: 151 MG/DL — HIGH (ref 70–99)
GLUCOSE BLDC GLUCOMTR-MCNC: 178 MG/DL — HIGH (ref 70–99)
GLUCOSE BLDC GLUCOMTR-MCNC: 95 MG/DL — SIGNIFICANT CHANGE UP (ref 70–99)
GLUCOSE SERPL-MCNC: 143 MG/DL — HIGH (ref 70–99)
HCT VFR BLD CALC: 35 % — SIGNIFICANT CHANGE UP (ref 34.5–45)
HGB BLD-MCNC: 10.5 G/DL — LOW (ref 11.5–15.5)
MAGNESIUM SERPL-MCNC: 2 MG/DL — SIGNIFICANT CHANGE UP (ref 1.6–2.6)
MCHC RBC-ENTMCNC: 28.5 PG — SIGNIFICANT CHANGE UP (ref 27–34)
MCHC RBC-ENTMCNC: 30 GM/DL — LOW (ref 32–36)
MCV RBC AUTO: 95.1 FL — SIGNIFICANT CHANGE UP (ref 80–100)
PHOSPHATE SERPL-MCNC: 3.2 MG/DL — SIGNIFICANT CHANGE UP (ref 2.5–4.5)
PLATELET # BLD AUTO: 166 K/UL — SIGNIFICANT CHANGE UP (ref 150–400)
POTASSIUM SERPL-MCNC: 3.9 MMOL/L — SIGNIFICANT CHANGE UP (ref 3.5–5.3)
POTASSIUM SERPL-SCNC: 3.9 MMOL/L — SIGNIFICANT CHANGE UP (ref 3.5–5.3)
PROT SERPL-MCNC: 6.6 G/DL — SIGNIFICANT CHANGE UP (ref 6–8.3)
RBC # BLD: 3.68 M/UL — LOW (ref 3.8–5.2)
RBC # FLD: 17.5 % — HIGH (ref 10.3–14.5)
SODIUM SERPL-SCNC: 144 MMOL/L — SIGNIFICANT CHANGE UP (ref 135–145)
SPECIMEN SOURCE: SIGNIFICANT CHANGE UP
SPECIMEN SOURCE: SIGNIFICANT CHANGE UP
WBC # BLD: 3.44 K/UL — LOW (ref 3.8–10.5)
WBC # FLD AUTO: 3.44 K/UL — LOW (ref 3.8–10.5)

## 2018-02-19 PROCEDURE — 99233 SBSQ HOSP IP/OBS HIGH 50: CPT | Mod: GC

## 2018-02-19 RX ORDER — CALCIUM CARBONATE 500(1250)
2 TABLET ORAL ONCE
Qty: 0 | Refills: 0 | Status: DISCONTINUED | OUTPATIENT
Start: 2018-02-19 | End: 2018-02-19

## 2018-02-19 RX ORDER — ONDANSETRON 8 MG/1
4 TABLET, FILM COATED ORAL ONCE
Qty: 0 | Refills: 0 | Status: COMPLETED | OUTPATIENT
Start: 2018-02-19 | End: 2018-02-19

## 2018-02-19 RX ADMIN — LACOSAMIDE 150 MILLIGRAM(S): 50 TABLET ORAL at 17:45

## 2018-02-19 RX ADMIN — Medication 80 MILLIGRAM(S): at 17:27

## 2018-02-19 RX ADMIN — Medication 5 UNIT(S): at 16:34

## 2018-02-19 RX ADMIN — PIPERACILLIN AND TAZOBACTAM 25 GRAM(S): 4; .5 INJECTION, POWDER, LYOPHILIZED, FOR SOLUTION INTRAVENOUS at 17:45

## 2018-02-19 RX ADMIN — Medication 5 UNIT(S): at 12:23

## 2018-02-19 RX ADMIN — PIPERACILLIN AND TAZOBACTAM 25 GRAM(S): 4; .5 INJECTION, POWDER, LYOPHILIZED, FOR SOLUTION INTRAVENOUS at 11:05

## 2018-02-19 RX ADMIN — PIPERACILLIN AND TAZOBACTAM 25 GRAM(S): 4; .5 INJECTION, POWDER, LYOPHILIZED, FOR SOLUTION INTRAVENOUS at 02:25

## 2018-02-19 RX ADMIN — Medication 5 UNIT(S): at 08:19

## 2018-02-19 RX ADMIN — ONDANSETRON 4 MILLIGRAM(S): 8 TABLET, FILM COATED ORAL at 23:22

## 2018-02-19 RX ADMIN — INSULIN GLARGINE 8 UNIT(S): 100 INJECTION, SOLUTION SUBCUTANEOUS at 21:18

## 2018-02-19 RX ADMIN — Medication 2: at 12:23

## 2018-02-19 RX ADMIN — Medication 80 MILLIGRAM(S): at 05:02

## 2018-02-19 RX ADMIN — LACOSAMIDE 150 MILLIGRAM(S): 50 TABLET ORAL at 05:02

## 2018-02-19 NOTE — PROGRESS NOTE ADULT - PROBLEM SELECTOR PLAN 1
- spiked a fever on 2/14 with no found source: UA not convincing for UTI and pt s/p 3 day course of CTX, CXR: no focal consolidation, B/C from 2/14 NTD  - RUQ U/S obtained in setting of transaminitis with no evidence of stones, and no tenderness to suggest cholecystitis, however mild thickening and gallbladder sludge. Pt on zosyn already however, and currently not surgical candidate given volume overload  - Day 5/5 of vancomycin/zosyn -- would d/c vanco and c/w zosyn, currently day 5  - patient could also be spiking fevers because of a PE in the setting of oxygen use but dopplers negative for DVT and patient not candidate for AC given SDH - spiked a fever on 2/14 with no found source: UA not convincing for UTI and pt s/p 3 day course of CTX, CXR: no focal consolidation, B/C from 2/14 NTD  - RUQ U/S obtained in setting of transaminitis with no evidence of stones, and no tenderness to suggest cholecystitis, however mild thickening and gallbladder sludge. Pt on zosyn already however, and currently not surgical candidate given volume overload  - Day 6/7 of zosyn   - patient could also be spiking fevers because of a PE in the setting of oxygen use but dopplers negative for DVT and patient not candidate for AC given SDH; on SCDs

## 2018-02-19 NOTE — PROGRESS NOTE ADULT - SUBJECTIVE AND OBJECTIVE BOX
Patient is a 89y old  Female who presents with a chief complaint of fall and altered mental status (18 Feb 2018 13:32)      SUBJECTIVE / OVERNIGHT EVENTS:  No overnight events.  Patient feels well, has SOB when lying down.  She denies CP, abdominal pain, nausea, vomiting, diarrhea, constipation, LE edema.     Tele- aflutter rate 40-60 overnight    MEDICATIONS  (STANDING):  dextrose 5%. 1000 milliLiter(s) (50 mL/Hr) IV Continuous <Continuous>  dextrose 50% Injectable 12.5 Gram(s) IV Push once  dextrose 50% Injectable 25 Gram(s) IV Push once  dextrose 50% Injectable 25 Gram(s) IV Push once  furosemide   Injectable 80 milliGRAM(s) IV Push two times a day  insulin glargine Injectable (LANTUS) 8 Unit(s) SubCutaneous at bedtime  insulin lispro (HumaLOG) corrective regimen sliding scale   SubCutaneous three times a day before meals  insulin lispro (HumaLOG) corrective regimen sliding scale   SubCutaneous at bedtime  insulin lispro Injectable (HumaLOG) 5 Unit(s) SubCutaneous three times a day before meals  lacosamide 150 milliGRAM(s) Oral two times a day  piperacillin/tazobactam IVPB. 3.375 Gram(s) IV Intermittent every 8 hours    MEDICATIONS  (PRN):  acetaminophen   Tablet. 650 milliGRAM(s) Oral every 6 hours PRN mild to moderate pain  aluminum hydroxide/magnesium hydroxide/simethicone Suspension 30 milliLiter(s) Oral every 4 hours PRN Dyspepsia  aluminum hydroxide/magnesium hydroxide/simethicone Suspension 30 milliLiter(s) Oral every 6 hours PRN Dyspepsia  dextrose Gel 1 Dose(s) Oral once PRN Blood Glucose LESS THAN 70 milliGRAM(s)/deciliter  glucagon  Injectable 1 milliGRAM(s) IntraMuscular once PRN Glucose LESS THAN 70 milligrams/deciliter      CAPILLARY BLOOD GLUCOSE      POCT Blood Glucose.: 147 mg/dL (19 Feb 2018 07:21)  POCT Blood Glucose.: 139 mg/dL (18 Feb 2018 21:16)  POCT Blood Glucose.: 179 mg/dL (18 Feb 2018 18:13)  POCT Blood Glucose.: 88 mg/dL (18 Feb 2018 17:17)  POCT Blood Glucose.: 66 mg/dL (18 Feb 2018 16:18)  POCT Blood Glucose.: 66 mg/dL (18 Feb 2018 16:16)  POCT Blood Glucose.: 140 mg/dL (18 Feb 2018 11:31)    I&O's Summary    18 Feb 2018 07:01  -  19 Feb 2018 07:00  --------------------------------------------------------  IN: 860 mL / OUT: 1100 mL / NET: -240 mL      Vital Signs Last 24 Hrs  T(C): 36.5 (19 Feb 2018 04:51), Max: 36.7 (18 Feb 2018 20:23)  T(F): 97.7 (19 Feb 2018 04:51), Max: 98.1 (18 Feb 2018 20:23)  HR: 48 (19 Feb 2018 05:32) (46 - 57)  BP: 157/98 (19 Feb 2018 05:32) (117/57 - 188/68)  BP(mean): --  RR: 18 (19 Feb 2018 05:17) (16 - 18)  SpO2: 100% (19 Feb 2018 04:51) (99% - 100%)    PHYSICAL EXAM:  GENERAL: NAD, well-developed  HEAD:  Atraumatic, Normocephalic  EYES: EOMI, PERRLA, conjunctiva and sclera clear  NECK: Supple, No JVD  CHEST/LUNG: Clear to auscultation bilaterally; No wheeze  HEART: Regular rate and rhythm; No murmurs, rubs, or gallops  ABDOMEN: Soft, Nontender, Nondistended; Bowel sounds present  EXTREMITIES:  2+ Peripheral Pulses, No clubbing, cyanosis, or edema  PSYCH: AAOx3  NEUROLOGY: non-focal  SKIN: No rashes or lesions    LABS:                        10.5   4.27  )-----------( 184      ( 18 Feb 2018 08:49 )             33.7     Auto Eosinophil # x     / Auto Eosinophil % x     / Auto Neutrophil # x     / Auto Neutrophil % x     / BANDS % x                            10.1   3.96  )-----------( 168      ( 17 Feb 2018 08:54 )             32.5     Auto Eosinophil # x     / Auto Eosinophil % x     / Auto Neutrophil # x     / Auto Neutrophil % x     / BANDS % x        02-19    144  |  100  |  19  ----------------------------<  143<H>  3.9   |  33<H>  |  1.09  02-18    142  |  98  |  26<H>  ----------------------------<  212<H>  4.0   |  30  |  1.16    Ca    10.4      19 Feb 2018 07:45  Mg     2.0     02-19  Phos  3.2     02-19  TPro  6.6  /  Alb  3.0<L>  /  TBili  0.5  /  DBili  x   /  AST  46<H>  /  ALT  58<H>  /  AlkPhos  141<H>  02-19  TPro  6.9  /  Alb  3.0<L>  /  TBili  0.5  /  DBili  x   /  AST  54<H>  /  ALT  66<H>  /  AlkPhos  170<H>  02-18                RESPIRATORY  VENT:    ABG:     VBG:     RADIOLOGY & ADDITIONAL TESTS:    Imaging Personally Reviewed:    Consultant(s) Notes Reviewed:      Care Discussed with Consultants/Other Providers: Patient is a 89y old  Female who presents with a chief complaint of fall and altered mental status (18 Feb 2018 13:32)      SUBJECTIVE / OVERNIGHT EVENTS:  No overnight events.  Patient feels well, has SOB when lying down.  She denies CP, abdominal pain, nausea, vomiting, diarrhea, constipation, LE edema.     Tele- afib rate 40-60 overnight    MEDICATIONS  (STANDING):  dextrose 5%. 1000 milliLiter(s) (50 mL/Hr) IV Continuous <Continuous>  dextrose 50% Injectable 12.5 Gram(s) IV Push once  dextrose 50% Injectable 25 Gram(s) IV Push once  dextrose 50% Injectable 25 Gram(s) IV Push once  furosemide   Injectable 80 milliGRAM(s) IV Push two times a day  insulin glargine Injectable (LANTUS) 8 Unit(s) SubCutaneous at bedtime  insulin lispro (HumaLOG) corrective regimen sliding scale   SubCutaneous three times a day before meals  insulin lispro (HumaLOG) corrective regimen sliding scale   SubCutaneous at bedtime  insulin lispro Injectable (HumaLOG) 5 Unit(s) SubCutaneous three times a day before meals  lacosamide 150 milliGRAM(s) Oral two times a day  piperacillin/tazobactam IVPB. 3.375 Gram(s) IV Intermittent every 8 hours    MEDICATIONS  (PRN):  acetaminophen   Tablet. 650 milliGRAM(s) Oral every 6 hours PRN mild to moderate pain  aluminum hydroxide/magnesium hydroxide/simethicone Suspension 30 milliLiter(s) Oral every 4 hours PRN Dyspepsia  aluminum hydroxide/magnesium hydroxide/simethicone Suspension 30 milliLiter(s) Oral every 6 hours PRN Dyspepsia  dextrose Gel 1 Dose(s) Oral once PRN Blood Glucose LESS THAN 70 milliGRAM(s)/deciliter  glucagon  Injectable 1 milliGRAM(s) IntraMuscular once PRN Glucose LESS THAN 70 milligrams/deciliter      CAPILLARY BLOOD GLUCOSE      POCT Blood Glucose.: 147 mg/dL (19 Feb 2018 07:21)  POCT Blood Glucose.: 139 mg/dL (18 Feb 2018 21:16)  POCT Blood Glucose.: 179 mg/dL (18 Feb 2018 18:13)  POCT Blood Glucose.: 88 mg/dL (18 Feb 2018 17:17)  POCT Blood Glucose.: 66 mg/dL (18 Feb 2018 16:18)  POCT Blood Glucose.: 66 mg/dL (18 Feb 2018 16:16)  POCT Blood Glucose.: 140 mg/dL (18 Feb 2018 11:31)    I&O's Summary    18 Feb 2018 07:01  -  19 Feb 2018 07:00  --------------------------------------------------------  IN: 860 mL / OUT: 1100 mL / NET: -240 mL      Vital Signs Last 24 Hrs  T(C): 36.5 (19 Feb 2018 04:51), Max: 36.7 (18 Feb 2018 20:23)  T(F): 97.7 (19 Feb 2018 04:51), Max: 98.1 (18 Feb 2018 20:23)  HR: 48 (19 Feb 2018 05:32) (46 - 57)  BP: 157/98 (19 Feb 2018 05:32) (117/57 - 188/68)  BP(mean): --  RR: 18 (19 Feb 2018 05:17) (16 - 18)  SpO2: 100% (19 Feb 2018 04:51) (99% - 100%)    PHYSICAL EXAM:  GENERAL: tachypneic, on 4L O2 nasal cannula  HEAD:  Atraumatic, Normocephalic  EYES: EOMI, conjunctiva and sclera clear  NECK: Supple, No JVD  CHEST/LUNG: crackles through lung fields up to mid lung, no wheezing  HEART: Regular rate and rhythm; Grade III systolic murmur loudest at right sternal border but heard throughout  ABDOMEN: Soft, Nontender, Nondistended; Bowel sounds present  EXTREMITIES:  peripheral pulses unable to be palpated, no LE edema, No clubbing, cyanosis  NEUROLOGY: A&Ox2  SKIN: No rashes or lesions      LABS:                        10.5   4.27  )-----------( 184      ( 18 Feb 2018 08:49 )             33.7     Auto Eosinophil # x     / Auto Eosinophil % x     / Auto Neutrophil # x     / Auto Neutrophil % x     / BANDS % x                            10.1   3.96  )-----------( 168      ( 17 Feb 2018 08:54 )             32.5     Auto Eosinophil # x     / Auto Eosinophil % x     / Auto Neutrophil # x     / Auto Neutrophil % x     / BANDS % x        02-19    144  |  100  |  19  ----------------------------<  143<H>  3.9   |  33<H>  |  1.09  02-18    142  |  98  |  26<H>  ----------------------------<  212<H>  4.0   |  30  |  1.16    Ca    10.4      19 Feb 2018 07:45  Mg     2.0     02-19  Phos  3.2     02-19  TPro  6.6  /  Alb  3.0<L>  /  TBili  0.5  /  DBili  x   /  AST  46<H>  /  ALT  58<H>  /  AlkPhos  141<H>  02-19  TPro  6.9  /  Alb  3.0<L>  /  TBili  0.5  /  DBili  x   /  AST  54<H>  /  ALT  66<H>  /  AlkPhos  170<H>  02-18                RESPIRATORY  VENT:    ABG:     VBG:     RADIOLOGY & ADDITIONAL TESTS:    Imaging Personally Reviewed:    Consultant(s) Notes Reviewed:      Care Discussed with Consultants/Other Providers:

## 2018-02-19 NOTE — PROGRESS NOTE ADULT - PROBLEM SELECTOR PLAN 6
- creatinine stable in the setting of diuresis   - renally dose meds  - avoid nephrotoxic agents resolved  - creatinine stable (1.09) in the setting of diuresis   - renally dose meds  - avoid nephrotoxic agents

## 2018-02-19 NOTE — PROGRESS NOTE ADULT - ASSESSMENT
89 year old female with PMHx of A-fib not on AC 2/2 subdural hematoma, T2DM, HTN, and cervical cancer s/p hysterectomy who presents after a fall with encephalopathy from outside nursing facility, currently volume overloaded. 89 year old female with PMHx of A-fib not on AC 2/2 subdural hematoma, T2DM, HTN, and cervical cancer s/p hysterectomy who presents after a fall with encephalopathy from outside nursing facility.  Patient currently clinically appears to be in heart failure.

## 2018-02-19 NOTE — PROGRESS NOTE ADULT - PROBLEM SELECTOR PLAN 5
- fall at nursing facility  with no reported loss of conciousness, as per daughter, has remained alert and oriented as per baseline  - CTH performed x 2, no evidence of worsening of the left subdural collection. No intervention planned by NSGY   - q4 neuro checks, fall precautions  - family wants patient to go home - - will go home with home PT services - fall at nursing facility with no reported loss of consciousness, as per daughter, has remained alert and oriented as per baseline  - CTH performed x 2, no evidence of worsening of the left subdural collection. No intervention planned by neurosurgery  - q4 neuro checks, fall precautions  - family wants patient to go home - - will go home with home PT services

## 2018-02-19 NOTE — PROGRESS NOTE ADULT - PROBLEM SELECTOR PLAN 3
- improved, AAO x 2 and mentating well, etiology secondary to metabolic causes now resolved   - s/p fall with CTH not showing any evidence of acute ICH, and with decreased overall left subdural collection  -c/b dementia and advanced age - resolved, AAO x 2 and mentating well  - s/p fall with CTH not showing any evidence of acute ICH, and with decreased overall left subdural collection  -c/b dementia and advanced age

## 2018-02-19 NOTE — PROGRESS NOTE ADULT - PROBLEM SELECTOR PLAN 9
- As per conversation with both daughters - - patient is FULL CODE  - DVT PPx: SCDs     Dispo: pending volume optimization. Per PT rehab, but pt prefers home with home PT.     Nuzhat Avalos, PGY-1   [phone] - As per conversation with both daughters --- patient is FULL CODE  - DVT PPx: SCDs     Dispo:  pending volume optimization. Per PT rehab, but pt prefers home with home PT.     Nuzhat Avalos, PGY-1   [phone]

## 2018-02-19 NOTE — PROGRESS NOTE ADULT - PROBLEM SELECTOR PLAN 7
- etiology initially thought to be 2/2 congestive hepatopathy   - RUQ U/S not revealing of stones or liver pathology and exam unremarkable. However, sludge and mild gallbladder wall thickening.    - will continue to monitor, transaminases - etiology initially thought to be 2/2 congestive hepatopathy   - RUQ U/S not revealing of stones or liver pathology and exam unremarkable. However, sludge and mild gallbladder wall thickening.    - will continue to monitor, transaminases downtrending

## 2018-02-19 NOTE — PROGRESS NOTE ADULT - PROBLEM SELECTOR PLAN 2
- likely 2/2 volume overload with right sided pleural effusion  - family refusing [facility] for I/O -- > pt still volume overloaded, on 4 L NC. Volume increasing, net positive. Crackles throughout lung filed. Would consider going up on lasix given as bp tolerating. Appears clinically like new heart failure.  Crackles throughout lungs up to mid lung fields, no LE edema currently.   - family refusing [facility] for I/O -- > pt still volume overloaded, on 4 L NC.  - C/w lasix 80 IV BID, responding well clinically  - TTE in 1/2018: EF 75%, moderate MS and moderate AS Appears clinically like new heart failure.  Crackles throughout lungs up to mid lung fields, no LE edema currently.   - family refusing [facility] for I/O -- > pt still volume overloaded, on 4 L NC.  - C/w lasix 80 IV BID, responding well clinically  - daily weights  - TTE in 1/2018: EF 75%, moderate MS and moderate AS  - f/u cardiology reccs Appears clinically like new heart failure.  Crackles throughout lungs up to mid lung fields, no LE edema currently.   - family refusing [facility] for I/O -- > pt still volume overloaded, on 4 L NC.  - C/w lasix 80 IV BID, responding well clinically  - daily weights  - TTE in 1/2018: EF 75%, moderate MS and moderate AS

## 2018-02-20 LAB
ALBUMIN SERPL ELPH-MCNC: 2.6 G/DL — LOW (ref 3.3–5)
ALP SERPL-CCNC: 119 U/L — SIGNIFICANT CHANGE UP (ref 40–120)
ALT FLD-CCNC: 44 U/L — SIGNIFICANT CHANGE UP (ref 10–45)
ANION GAP SERPL CALC-SCNC: 13 MMOL/L — SIGNIFICANT CHANGE UP (ref 5–17)
AST SERPL-CCNC: 37 U/L — SIGNIFICANT CHANGE UP (ref 10–40)
BILIRUB SERPL-MCNC: 0.5 MG/DL — SIGNIFICANT CHANGE UP (ref 0.2–1.2)
BUN SERPL-MCNC: 20 MG/DL — SIGNIFICANT CHANGE UP (ref 7–23)
CALCIUM SERPL-MCNC: 10 MG/DL — SIGNIFICANT CHANGE UP (ref 8.4–10.5)
CHLORIDE SERPL-SCNC: 100 MMOL/L — SIGNIFICANT CHANGE UP (ref 96–108)
CO2 SERPL-SCNC: 34 MMOL/L — HIGH (ref 22–31)
CREAT SERPL-MCNC: 1.03 MG/DL — SIGNIFICANT CHANGE UP (ref 0.5–1.3)
GLUCOSE BLDC GLUCOMTR-MCNC: 114 MG/DL — HIGH (ref 70–99)
GLUCOSE BLDC GLUCOMTR-MCNC: 127 MG/DL — HIGH (ref 70–99)
GLUCOSE BLDC GLUCOMTR-MCNC: 205 MG/DL — HIGH (ref 70–99)
GLUCOSE BLDC GLUCOMTR-MCNC: 245 MG/DL — HIGH (ref 70–99)
GLUCOSE SERPL-MCNC: 131 MG/DL — HIGH (ref 70–99)
HCT VFR BLD CALC: 32.6 % — LOW (ref 34.5–45)
HGB BLD-MCNC: 10 G/DL — LOW (ref 11.5–15.5)
MAGNESIUM SERPL-MCNC: 1.9 MG/DL — SIGNIFICANT CHANGE UP (ref 1.6–2.6)
MCHC RBC-ENTMCNC: 29.4 PG — SIGNIFICANT CHANGE UP (ref 27–34)
MCHC RBC-ENTMCNC: 30.7 GM/DL — LOW (ref 32–36)
MCV RBC AUTO: 95.9 FL — SIGNIFICANT CHANGE UP (ref 80–100)
PHOSPHATE SERPL-MCNC: 3.1 MG/DL — SIGNIFICANT CHANGE UP (ref 2.5–4.5)
PLATELET # BLD AUTO: 152 K/UL — SIGNIFICANT CHANGE UP (ref 150–400)
POTASSIUM SERPL-MCNC: 3.5 MMOL/L — SIGNIFICANT CHANGE UP (ref 3.5–5.3)
POTASSIUM SERPL-SCNC: 3.5 MMOL/L — SIGNIFICANT CHANGE UP (ref 3.5–5.3)
PROT SERPL-MCNC: 6.6 G/DL — SIGNIFICANT CHANGE UP (ref 6–8.3)
RBC # BLD: 3.4 M/UL — LOW (ref 3.8–5.2)
RBC # FLD: 17.6 % — HIGH (ref 10.3–14.5)
SODIUM SERPL-SCNC: 147 MMOL/L — HIGH (ref 135–145)
WBC # BLD: 3.36 K/UL — LOW (ref 3.8–10.5)
WBC # FLD AUTO: 3.36 K/UL — LOW (ref 3.8–10.5)

## 2018-02-20 PROCEDURE — 99233 SBSQ HOSP IP/OBS HIGH 50: CPT | Mod: GC

## 2018-02-20 RX ADMIN — Medication 4: at 07:50

## 2018-02-20 RX ADMIN — LACOSAMIDE 150 MILLIGRAM(S): 50 TABLET ORAL at 05:14

## 2018-02-20 RX ADMIN — INSULIN GLARGINE 8 UNIT(S): 100 INJECTION, SOLUTION SUBCUTANEOUS at 21:56

## 2018-02-20 RX ADMIN — PIPERACILLIN AND TAZOBACTAM 25 GRAM(S): 4; .5 INJECTION, POWDER, LYOPHILIZED, FOR SOLUTION INTRAVENOUS at 02:36

## 2018-02-20 RX ADMIN — Medication 5 UNIT(S): at 17:05

## 2018-02-20 RX ADMIN — Medication 80 MILLIGRAM(S): at 17:05

## 2018-02-20 RX ADMIN — Medication 5 UNIT(S): at 07:50

## 2018-02-20 RX ADMIN — Medication 5 UNIT(S): at 12:00

## 2018-02-20 RX ADMIN — PIPERACILLIN AND TAZOBACTAM 25 GRAM(S): 4; .5 INJECTION, POWDER, LYOPHILIZED, FOR SOLUTION INTRAVENOUS at 10:40

## 2018-02-20 RX ADMIN — Medication 80 MILLIGRAM(S): at 05:16

## 2018-02-20 RX ADMIN — LACOSAMIDE 150 MILLIGRAM(S): 50 TABLET ORAL at 17:05

## 2018-02-20 NOTE — DIETITIAN INITIAL EVALUATION ADULT. - ENERGY NEEDS
Ht: 60“, Wt: 156.5 lbs- 2/20 standing, BMI: 30.6 kg/m2, IBW: 100 lbs (+/-10%), %IBW: 157%  Pertinent Information: 89 year old female with PMHx of A-fib not on AC 2/2 subdural hematoma, T2DM, HTN, and cervical cancer S/P hysterectomy who presents after a fall with encephalopathy from outside nursing facility.  Patient currently clinically appears to be in heart failure and is being diuresed.   1+ winnie. leg, ankle edema, no pressure injury

## 2018-02-20 NOTE — PROGRESS NOTE ADULT - ASSESSMENT
89 year old female with PMHx of A-fib not on AC 2/2 subdural hematoma, T2DM, HTN, and cervical cancer s/p hysterectomy who presents after a fall with encephalopathy from outside nursing facility.  Patient currently clinically appears to be in heart failure and is being diuresed.

## 2018-02-20 NOTE — DIETITIAN INITIAL EVALUATION ADULT. - ADHERENCE
Pt's daughter reports pt was on regular texture diet PTA. As per daughter pt was injecting insulin in the morning and evening and reports pt's BG was monitored by pt's other daughter. Noted last Hba1c 6.2% on 1/4/18.

## 2018-02-20 NOTE — PROGRESS NOTE ADULT - PROBLEM SELECTOR PLAN 1
Appears clinically like new heart failure.  Crackles throughout lungs up to mid lung fields, no LE edema currently.   - family refusing [facility] for I/O -- > pt still volume overloaded, on 4 L NC.  - daily weights  - TTE in 1/2018: EF 75%, moderate MS and moderate AS  - C/w lasix 80 IV BID, responding well clinically  - will wean oxygen

## 2018-02-20 NOTE — DIETITIAN INITIAL EVALUATION ADULT. - PROBLEM SELECTOR PLAN 4
- new onset hyponatremia, unclear as to whether this is 2/2 SIADH in the setting of pain from fall vs hypovolemic hyponatremia   - urine studies pending,

## 2018-02-20 NOTE — PROGRESS NOTE ADULT - PROBLEM SELECTOR PLAN 2
- spiked a fever on 2/14 with no found source: UA not convincing for UTI and pt s/p 3 day course of CTX, CXR: no focal consolidation, B/C from 2/14 NTD  - RUQ U/S obtained in setting of transaminitis with no evidence of stones, and no tenderness to suggest cholecystitis, however mild thickening and gallbladder sludge.   - afebrile past 48 hours  - Day 7/7 of zosyn

## 2018-02-20 NOTE — PROGRESS NOTE ADULT - PROBLEM SELECTOR PLAN 5
- resolved, AAO x 2 and mentating well  - s/p fall with CTH not showing any evidence of acute ICH, and with decreased overall left subdural collection  -c/b dementia and advanced age

## 2018-02-20 NOTE — DIETITIAN INITIAL EVALUATION ADULT. - PROBLEM SELECTOR PLAN 8
- As per conversation with both daughters - - patient is FULL CODE  - DVT PPx: SCDs     Anneliese Robles  PGY1  [phone]

## 2018-02-20 NOTE — DIETITIAN INITIAL EVALUATION ADULT. - FACTORS AFF FOOD INTAKE
pt is on dysphagia diet as per pt's daughter pt dislikes pureed foods and has decreased po intake since admission.

## 2018-02-20 NOTE — DIETITIAN INITIAL EVALUATION ADULT. - NS AS NUTRI INTERV MEALS SNACK
As per family pt with no previous swallowing difficulty, no assessment noted in [place], consider swallow evaluation, Defer texture/consistency to SLP/MD within goals of care. May consider adding low sodium if pt continues with fluid overload related to heart failure. Encourage po intake with nutrient dense foods. Provide food preferences as able. Monitor weight, lab values, po intake and GI tolerance. RD to remain available for further nutrition interventions as indicated. As per family pt with no previous swallowing difficulty and no assessment noted in [place], consider swallow evaluation, defer texture/consistency to SLP/MD within goals of care. Continue Consistent Carbohydrate and may consider adding low sodium if pt continues with fluid overload related to heart failure. Encourage po intake with nutrient dense foods. Provide food preferences as able. Monitor weight, lab values, po intake and GI tolerance. RD to remain available for further nutrition interventions as indicated.

## 2018-02-20 NOTE — PROGRESS NOTE ADULT - PROBLEM SELECTOR PLAN 6
- fall at nursing facility with no reported loss of consciousness, as per daughter, has remained alert and oriented as per baseline  - CTH performed x 2, no evidence of worsening of the left subdural collection. No intervention planned by neurosurgery  - q4 neuro checks, fall precautions  - family wants patient to go home - - will go home with home PT services

## 2018-02-20 NOTE — PROGRESS NOTE ADULT - PROBLEM SELECTOR PLAN 7
resolved  - creatinine stable (1.03 today) in the setting of diuresis   - renally dose meds  - avoid nephrotoxic agents

## 2018-02-20 NOTE — PROGRESS NOTE ADULT - SUBJECTIVE AND OBJECTIVE BOX
Patient is a 89y old  Female who presents with a chief complaint of fall and altered mental status (18 Feb 2018 13:32)      SUBJECTIVE / OVERNIGHT EVENTS:  No overnight events. Continues to have a productive cough with clear sputum. She denies CP, abdominal pain, nausea, vomiting, diarrhea, constipation, LE edema.     Tele- afib rate 40-50 overnight    MEDICATIONS  (STANDING):  dextrose 5%. 1000 milliLiter(s) (50 mL/Hr) IV Continuous <Continuous>  dextrose 50% Injectable 12.5 Gram(s) IV Push once  dextrose 50% Injectable 25 Gram(s) IV Push once  dextrose 50% Injectable 25 Gram(s) IV Push once  furosemide   Injectable 80 milliGRAM(s) IV Push two times a day  insulin glargine Injectable (LANTUS) 8 Unit(s) SubCutaneous at bedtime  insulin lispro (HumaLOG) corrective regimen sliding scale   SubCutaneous three times a day before meals  insulin lispro (HumaLOG) corrective regimen sliding scale   SubCutaneous at bedtime  insulin lispro Injectable (HumaLOG) 5 Unit(s) SubCutaneous three times a day before meals  lacosamide 150 milliGRAM(s) Oral two times a day  piperacillin/tazobactam IVPB. 3.375 Gram(s) IV Intermittent every 8 hours    MEDICATIONS  (PRN):  acetaminophen   Tablet. 650 milliGRAM(s) Oral every 6 hours PRN mild to moderate pain  aluminum hydroxide/magnesium hydroxide/simethicone Suspension 30 milliLiter(s) Oral every 4 hours PRN Dyspepsia  aluminum hydroxide/magnesium hydroxide/simethicone Suspension 30 milliLiter(s) Oral every 6 hours PRN Dyspepsia  dextrose Gel 1 Dose(s) Oral once PRN Blood Glucose LESS THAN 70 milliGRAM(s)/deciliter  glucagon  Injectable 1 milliGRAM(s) IntraMuscular once PRN Glucose LESS THAN 70 milligrams/deciliter      CAPILLARY BLOOD GLUCOSE      POCT Blood Glucose.: 114 mg/dL (20 Feb 2018 11:23)      I&O's Summary    19 Feb 2018 07:01  -  20 Feb 2018 07:00  --------------------------------------------------------  IN: 1080 mL / OUT: 500 mL / NET: 580 mL      Vital Signs Last 24 Hrs  T(C): 36.9 (20 Feb 2018 12:11), Max: 37 (19 Feb 2018 20:26)  T(F): 98.5 (20 Feb 2018 12:11), Max: 98.6 (19 Feb 2018 20:26)  HR: 50 (20 Feb 2018 12:11) (40 - 58)  BP: 164/60 (20 Feb 2018 12:11) (109/65 - 191/70)  BP(mean): --  RR: 20 (20 Feb 2018 12:11) (18 - 20)  SpO2: 98% (20 Feb 2018 12:11) (96% - 99%)    PHYSICAL EXAM:  GENERAL: tachypneic, on 4L O2 nasal cannula  HEAD:  Atraumatic, Normocephalic  EYES: EOMI, conjunctiva and sclera clear  NECK: Supple, No JVD  CHEST/LUNG: crackles through lung fields up to mid lung, no wheezing  HEART: Regular rate and rhythm; Grade III systolic murmur loudest at right sternal border but heard throughout  ABDOMEN: Soft, Nontender, Nondistended; Bowel sounds present  EXTREMITIES:  peripheral pulses unable to be palpated, no LE edema, No clubbing, cyanosis  NEUROLOGY: A&Ox2  SKIN: No rashes or lesions      LABS:                           10.0   3.36  )-----------( 152      ( 20 Feb 2018 09:09 )             32.6     02-20    147<H>  |  100  |  20  ----------------------------<  131<H>  3.5   |  34<H>  |  1.03    Ca    10.0      20 Feb 2018 09:06  Phos  3.1     02-20  Mg     1.9     02-20    TPro  6.6  /  Alb  2.6<L>  /  TBili  0.5  /  DBili  x   /  AST  37  /  ALT  44  /  AlkPhos  119  02-20    RESPIRATORY  VENT:    ABG:     VBG:     RADIOLOGY & ADDITIONAL TESTS:    Imaging Personally Reviewed:    Consultant(s) Notes Reviewed:      Care Discussed with Consultants/Other Providers:

## 2018-02-20 NOTE — PROGRESS NOTE ADULT - PROBLEM SELECTOR PLAN 3
- etiology initially thought to be 2/2 congestive hepatopathy   - RUQ U/S not revealing of stones or liver pathology and exam unremarkable. However, sludge and mild gallbladder wall thickening.    - will continue to monitor, transaminases downtrending

## 2018-02-20 NOTE — DIETITIAN INITIAL EVALUATION ADULT. - ORAL INTAKE PTA
Typical intake: toast with boiled egg and banana and green tea or coffee for breakfast; chicken or meat or fish with rice for lunch and something similar for dinner or salad. Pt's daughter reports she was taking vitamins but was unsure of which ones./good

## 2018-02-20 NOTE — DIETITIAN INITIAL EVALUATION ADULT. - OTHER INFO
Nutrition assessment for length of stay. Pt unable to provide detailed wt history, pt's daughter believes pt may have lost some weight. Noted from RD note on 1/5/18, pt weighed 159 pounds. Pt's current admission wt was 160.7 pounds, pt noted with fluid overload and being diuresed, most recent wt of 156.5 pounds today. Pt willing to try Glucerna. No GI distress at this time. Last BM was yesterday. Pt's family denies pt with previous chewing/swallowing trouble. No known food allergies.

## 2018-02-21 LAB
ALBUMIN SERPL ELPH-MCNC: 3 G/DL — LOW (ref 3.3–5)
ALP SERPL-CCNC: 113 U/L — SIGNIFICANT CHANGE UP (ref 40–120)
ALT FLD-CCNC: 35 U/L — SIGNIFICANT CHANGE UP (ref 10–45)
ANION GAP SERPL CALC-SCNC: 9 MMOL/L — SIGNIFICANT CHANGE UP (ref 5–17)
AST SERPL-CCNC: 34 U/L — SIGNIFICANT CHANGE UP (ref 10–40)
BILIRUB SERPL-MCNC: 0.4 MG/DL — SIGNIFICANT CHANGE UP (ref 0.2–1.2)
BUN SERPL-MCNC: 21 MG/DL — SIGNIFICANT CHANGE UP (ref 7–23)
CALCIUM SERPL-MCNC: 9.8 MG/DL — SIGNIFICANT CHANGE UP (ref 8.4–10.5)
CHLORIDE SERPL-SCNC: 98 MMOL/L — SIGNIFICANT CHANGE UP (ref 96–108)
CO2 SERPL-SCNC: 38 MMOL/L — HIGH (ref 22–31)
CREAT SERPL-MCNC: 1.17 MG/DL — SIGNIFICANT CHANGE UP (ref 0.5–1.3)
GLUCOSE BLDC GLUCOMTR-MCNC: 162 MG/DL — HIGH (ref 70–99)
GLUCOSE BLDC GLUCOMTR-MCNC: 166 MG/DL — HIGH (ref 70–99)
GLUCOSE BLDC GLUCOMTR-MCNC: 176 MG/DL — HIGH (ref 70–99)
GLUCOSE BLDC GLUCOMTR-MCNC: 206 MG/DL — HIGH (ref 70–99)
GLUCOSE SERPL-MCNC: 163 MG/DL — HIGH (ref 70–99)
HCT VFR BLD CALC: 31.4 % — LOW (ref 34.5–45)
HGB BLD-MCNC: 9.6 G/DL — LOW (ref 11.5–15.5)
MAGNESIUM SERPL-MCNC: 2 MG/DL — SIGNIFICANT CHANGE UP (ref 1.6–2.6)
MCHC RBC-ENTMCNC: 28.9 PG — SIGNIFICANT CHANGE UP (ref 27–34)
MCHC RBC-ENTMCNC: 30.6 GM/DL — LOW (ref 32–36)
MCV RBC AUTO: 94.6 FL — SIGNIFICANT CHANGE UP (ref 80–100)
PHOSPHATE SERPL-MCNC: 2.7 MG/DL — SIGNIFICANT CHANGE UP (ref 2.5–4.5)
PLATELET # BLD AUTO: 148 K/UL — LOW (ref 150–400)
POTASSIUM SERPL-MCNC: 3.2 MMOL/L — LOW (ref 3.5–5.3)
POTASSIUM SERPL-SCNC: 3.2 MMOL/L — LOW (ref 3.5–5.3)
PROT SERPL-MCNC: 6.6 G/DL — SIGNIFICANT CHANGE UP (ref 6–8.3)
RBC # BLD: 3.32 M/UL — LOW (ref 3.8–5.2)
RBC # FLD: 17.6 % — HIGH (ref 10.3–14.5)
SODIUM SERPL-SCNC: 145 MMOL/L — SIGNIFICANT CHANGE UP (ref 135–145)
WBC # BLD: 3.42 K/UL — LOW (ref 3.8–10.5)
WBC # FLD AUTO: 3.42 K/UL — LOW (ref 3.8–10.5)

## 2018-02-21 PROCEDURE — 99233 SBSQ HOSP IP/OBS HIGH 50: CPT | Mod: GC

## 2018-02-21 PROCEDURE — 99223 1ST HOSP IP/OBS HIGH 75: CPT | Mod: GC

## 2018-02-21 PROCEDURE — 93306 TTE W/DOPPLER COMPLETE: CPT | Mod: 26

## 2018-02-21 RX ORDER — LOSARTAN POTASSIUM 100 MG/1
50 TABLET, FILM COATED ORAL DAILY
Qty: 0 | Refills: 0 | Status: DISCONTINUED | OUTPATIENT
Start: 2018-02-21 | End: 2018-02-22

## 2018-02-21 RX ORDER — POTASSIUM CHLORIDE 20 MEQ
40 PACKET (EA) ORAL ONCE
Qty: 0 | Refills: 0 | Status: COMPLETED | OUTPATIENT
Start: 2018-02-21 | End: 2018-02-21

## 2018-02-21 RX ORDER — ONDANSETRON 8 MG/1
4 TABLET, FILM COATED ORAL ONCE
Qty: 0 | Refills: 0 | Status: COMPLETED | OUTPATIENT
Start: 2018-02-21 | End: 2018-02-21

## 2018-02-21 RX ORDER — FUROSEMIDE 40 MG
40 TABLET ORAL
Qty: 0 | Refills: 0 | Status: DISCONTINUED | OUTPATIENT
Start: 2018-02-21 | End: 2018-02-21

## 2018-02-21 RX ORDER — POTASSIUM CHLORIDE 20 MEQ
40 PACKET (EA) ORAL EVERY 4 HOURS
Qty: 0 | Refills: 0 | Status: DISCONTINUED | OUTPATIENT
Start: 2018-02-21 | End: 2018-02-21

## 2018-02-21 RX ORDER — FUROSEMIDE 40 MG
80 TABLET ORAL
Qty: 0 | Refills: 0 | Status: DISCONTINUED | OUTPATIENT
Start: 2018-02-21 | End: 2018-02-26

## 2018-02-21 RX ADMIN — Medication 5 UNIT(S): at 12:10

## 2018-02-21 RX ADMIN — INSULIN GLARGINE 8 UNIT(S): 100 INJECTION, SOLUTION SUBCUTANEOUS at 22:11

## 2018-02-21 RX ADMIN — Medication 80 MILLIGRAM(S): at 05:26

## 2018-02-21 RX ADMIN — Medication 5 UNIT(S): at 16:59

## 2018-02-21 RX ADMIN — Medication 5 UNIT(S): at 08:24

## 2018-02-21 RX ADMIN — Medication 2: at 08:23

## 2018-02-21 RX ADMIN — Medication 2: at 16:59

## 2018-02-21 RX ADMIN — Medication 40 MILLIEQUIVALENT(S): at 12:10

## 2018-02-21 RX ADMIN — Medication 80 MILLIGRAM(S): at 17:39

## 2018-02-21 RX ADMIN — LACOSAMIDE 150 MILLIGRAM(S): 50 TABLET ORAL at 05:16

## 2018-02-21 RX ADMIN — Medication 4: at 12:10

## 2018-02-21 RX ADMIN — LACOSAMIDE 150 MILLIGRAM(S): 50 TABLET ORAL at 17:51

## 2018-02-21 NOTE — PROGRESS NOTE ADULT - ATTENDING COMMENTS
Appreciate cardiology recs.  Will continue with diuresis with IV lasix 80mg BID.   Will fluid restrict and restart losartan  Echo pending.   Needs home O2, likely.

## 2018-02-21 NOTE — PROGRESS NOTE ADULT - PROBLEM SELECTOR PLAN 6
- fall at nursing facility with no reported loss of consciousness, as per daughter, has remained alert and oriented as per baseline  - CTH performed x 2, no evidence of worsening of the left subdural collection. No intervention planned by neurosurgery  - q4 neuro checks, fall precautions  - family wants patient to go home - - will go home with home PT services - fall at nursing facility with no reported loss of consciousness, as per daughter, has remained alert and oriented as per baseline  - CTH performed x 2, no evidence of worsening of the left subdural collection. No intervention planned by neurosurgery  - q4 neuro checks, fall precautions

## 2018-02-21 NOTE — CONSULT NOTE ADULT - ATTENDING COMMENTS
89 year old woman from nursing home for fall, altered mental status, recent evacuation of subdural hematoma with chronic slow atrial fibrillation off anticoagulation at least since SDH. Manifests signs of congestive heart failure. On exam stable blood pressure, mildly slow heart rate, prominent neck veins, rales at bases. Cardiac exam as amended above consistent with significant AS, no findings to suggest significant mitral stenosis.    Need diuresis for pulmonary vascular congestion, pulmonary hypertension. Doubt observation of mitral gradient on echo of clinical significance. May have low gradient severe AS, but based on overall clinical status this will need to be managed medically.
89F Hx Lt SDH s/p craniotomy/evacuation (1/4/18), Cervical Ca Tx and post Trach decannulation p/w fall from Rehab without LOC. She is A&Ox2-3 with limited mobility on assist and known bradyarrhythmia.  She fell off from chair in [facility] with brief altered mental status, CT head evolving SDH at Lt frontoparietal area slightly decreased in size but unclear acute component. Hemodynamically stable, hypovolemic hyponatremia 126 and baseline bradyarrhythmia 40s-50s baseline.   - Gentle hydration and Neuro observation per protocol Q4hr   - Repeat CT head in 12hr unless sudden chanced in status   - Monitor non-sinus bradyarrhythmia; repeat EKG
pt seen examined. agree with fellow note. do not think bradycardia and change in mental status are fully related, but will follow.

## 2018-02-21 NOTE — PROGRESS NOTE ADULT - PROBLEM SELECTOR PLAN 1
Appears clinically like new heart failure.  Crackles throughout lungs up to mid lung fields, no LE edema currently.   - family refusing [facility] for I/O -- > pt still volume overloaded, on 2 L NC.  - daily weights  - TTE in 1/2018: EF 75%, moderate MS and moderate AS  - C/w lasix 80 IV BID, responding well clinically  - will wean oxygen Appears clinically like new heart failure.  Crackles throughout lungs up to mid lung fields, no LE edema currently.  Patient wean to 2 L oxygen yesterday but still tachypneic when laying flat.   - family refusing bet[facility]ourt for I/O -- > pt still volume overloaded, on 2 L NC.  - daily weights  - TTE in 1/2018: EF 75%, moderate MS and moderate AS  - C/w lasix 80 IV BID Appears clinically like new heart failure.  Crackles throughout lungs up to mid lung fields, no LE edema currently.  Patient wean to 2 L oxygen yesterday but still tachypneic when laying flat.   - family refusing bet[facility]ourt for I/O -- > pt still volume overloaded, on 2 L NC.  - daily weights  - TTE in 1/2018: EF 75%, moderate MS and moderate AS  - C/w lasix 80 IV BID  - cardiology consulted

## 2018-02-21 NOTE — PROGRESS NOTE ADULT - PROBLEM SELECTOR PLAN 9
- As per conversation with both daughters --- patient is FULL CODE  - DVT PPx: SCDs     Dispo:  pending volume optimization and oxygen requirements. Per PT rehab, but pt prefers home with home PT.     Nuzhat Avalos, PGY-1   [phone] - As per conversation with both daughters --- patient is FULL CODE  - DVT PPx: SCDs     Dispo:  pending volume optimization and oxygen requirements. Per PT dispo to rehab, but pt prefers home with home PT.     Nuzhat Avalos, PGY-1   [phone]

## 2018-02-21 NOTE — PROGRESS NOTE ADULT - PROBLEM SELECTOR PLAN 7
resolved  - creatinine stable (1.03 today) in the setting of diuresis   - renally dose meds  - avoid nephrotoxic agents resolved  - creatinine stable in the setting of diuresis   - renally dose meds  - avoid nephrotoxic agents

## 2018-02-21 NOTE — CONSULT NOTE ADULT - SUBJECTIVE AND OBJECTIVE BOX
Ms. Koo is an 89 yo woman w/ hx of A-fib (not on AC previous, unclear why; follows w/ cardiologist at [facility]), DM, recent SDH s/p left craniotomy w/ evacuation 1/4/2018, cervical cancer s/p hysterectomy various years ago who presented again for recurrent mechanical fall.  Pt also noted to be hypoxic requiring NC (up to 4-5 L initially).  She was diuresed w/ IV lasix w/ good response (~ 800-1L out daily) and successfully weaned to 2L NC.  Cardiology consulted for further management of acute on chronic diastolic heart failure.  Difficult to obtain much history from patient, however states that her breathing has improved.  Otherwise no other concerning complaints.     Of note, patient admitted various times these past two months for various things (PNA, UTI, SDH, fluid overload) requiring IV abx, lasix.           PMH:   SDH (subdural hematoma)  Cervical cancer  Endocarditis  Hypertension  Diabetes mellitus  Atrial fibrillation      PSH:   H/O: hysterectomy  History of tracheostomy      Medications:   acetaminophen   Tablet. 650 milliGRAM(s) Oral every 6 hours PRN  aluminum hydroxide/magnesium hydroxide/simethicone Suspension 30 milliLiter(s) Oral every 4 hours PRN  aluminum hydroxide/magnesium hydroxide/simethicone Suspension 30 milliLiter(s) Oral every 6 hours PRN  dextrose 5%. 1000 milliLiter(s) IV Continuous <Continuous>  dextrose 50% Injectable 12.5 Gram(s) IV Push once  dextrose 50% Injectable 25 Gram(s) IV Push once  dextrose 50% Injectable 25 Gram(s) IV Push once  dextrose Gel 1 Dose(s) Oral once PRN  furosemide   Injectable 80 milliGRAM(s) IV Push two times a day  glucagon  Injectable 1 milliGRAM(s) IntraMuscular once PRN  insulin glargine Injectable (LANTUS) 8 Unit(s) SubCutaneous at bedtime  insulin lispro (HumaLOG) corrective regimen sliding scale   SubCutaneous three times a day before meals  insulin lispro (HumaLOG) corrective regimen sliding scale   SubCutaneous at bedtime  insulin lispro Injectable (HumaLOG) 5 Unit(s) SubCutaneous three times a day before meals  lacosamide 150 milliGRAM(s) Oral two times a day  ondansetron    Tablet 4 milliGRAM(s) Oral once      Allergies:  No Known Allergies      FAMILY HISTORY:  No pertinent family history in first degree relatives      Social History:  Smoking History:  Alcohol Use:  Drug Use:    Review of Systems:  REVIEW OF SYSTEMS:    CONSTITUTIONAL: No weakness, fevers or chills  EYES/ENT: No visual changes;  No dysphagia  NECK: No pain or stiffness  RESPIRATORY: +shortness of breath; however improved   CARDIOVASCULAR: No chest pain or palpitations; No lower extremity edema  GASTROINTESTINAL: No abdominal or epigastric pain. No nausea, vomiting, or hematemesis; No diarrhea or constipation. No melena or hematochezia.  BACK: No back pain  GENITOURINARY: No dysuria, frequency or hematuria  NEUROLOGICAL: No numbness or weakness  SKIN: No itching, burning, rashes, or lesions   All other review of systems is negative unless indicated above.    Physical Exam:  T(F): 97.7 (02-21), Max: 99 (02-21)  HR: 55 (02-21) (44 - 79)  BP: 128/72 (02-21) (126/66 - 169/75)  RR: 18 (02-21)  SpO2: 100% (02-21)    GENERAL: No acute distress, well-developed  HEAD:  Atraumatic, Normocephalic  ENT: EOMI, PERRLA, conjunctiva and sclera clear, Neck supple, +JVD bilaterally, moist mucosa  CHEST/LUNG:  Diffuse crackles w/ no wheezing/rhonchi   BACK: No spinal tenderness  HEART: bradycardic w/ reg rhythm; II/VI systolic murmur audible in 2nd R intercostal space radiating to left sternal border and apex, no rubs, or gallops  ABDOMEN: Soft, Nontender, Nondistended; Bowel sounds present  EXTREMITIES:  No clubbing, cyanosis, or edema  PSYCH: Nl behavior, nl affect  NEUROLOGY: AAOx3, non-focal, cranial nerves intact  SKIN: Normal color, No rashes or lesions  LINES:    Cardiovascular Diagnostic Testing:    ECG: slow Atypical flutter,  HR 50s      Echo:    < from: Transthoracic Echocardiogram (01.05.18 @ 11:32) >  1. Mitral annular calcification and calcified mitral  leaflets with decreased diastolic opening. Minimal mitral  regurgitation. Meantransmitral valve gradient equals 5 mm  Hg, consistent with moderate mitral stenosis. (HRabout 80  bpm)  2. Aortic valve not well visualized; appears to be a  calcified trileaflet valve with decreased opening. Peak  transaortic valve gradient equals 36 mm Hg, mean  transaortic valve gradient equals 23 mm Hg, estimated  aortic valve area equals 1.2 sqcm (by continuity equation),  aortic valve velocity time integral equals 59 cm,  consistent with moderate aortic stenosis. Consider  additional imaging of the aortic valve such as with  additional TTE or JOY imaging if clinically indicated. No  aortic valve regurgitation seen.  3. Endocardium not well visualized; grossly hyperdynamic  left ventricular systolic function. EF approximately 75% by  visual estimation.  4. The right ventricle is not well visualized; grossly  normal right ventricular systolic function.  *** No previous Echo exam.    < from: Xray Chest 1 View- PORTABLE-Urgent (02.14.18 @ 13:57) >    IMPRESSION:  Bilateral patchy opacities consistent pulmonary edema that   is grossly unchanged from previous radiograph.  New small bilateral pleural effusions.    CT Head No Cont (02.11.18 @ 21:22) >    Imaging:    Impression:    Slight decrease in size of acute component within left holohemispheric   subdural collection. Stable minimal rightward shift.      Interpretation of Telemetry:  aflutter, HR 40s-50s       Labs: Personally reviewed                        9.6    3.42  )-----------( 148      ( 21 Feb 2018 09:25 )             31.4     02-21    145  |  98  |  21  ----------------------------<  163<H>  3.2<L>   |  38<H>  |  1.17    Ca    9.8      21 Feb 2018 09:27  Phos  2.7     02-21  Mg     2.0     02-21    TPro  6.6  /  Alb  3.0<L>  /  TBili  0.4  /  DBili  x   /  AST  34  /  ALT  35  /  AlkPhos  113  02-21    Assessment    89 yo woman w/ hx of A-fib/flutter (not on AC previous, unclear why), DM, recent SDH s/p left craniotomy w/ evacuation 1/4/2018, cervical cancer s/p hysterectomy various years ago who presented again for recurrent mechanical fall.  In acute on chronic diastolic heart failure.  Likely hypertension (SBP 150s-160s mostly looking back) induced in the setting of not being optimized on her diuretic dose (only on 20 mg daily PO).  Also has valvulopathy (Mod-MS) which could contribute as well.  Clinically improving.  Cr slightly uptrended w/ worsening bicarb.      Plan  - decrease lasix dose to IV 40 mg BID   - cont to monitor I/Os, UOP  - fluid restrict to 1.5L daily   - re-start losartan 50 mg daily (home med) for better afterload reduction  - f/u TTE results.   - cont to follow     Vinay Rodriguez MD   [facility] [id number] Ms. Koo is an 89 yo woman w/ hx of A-fib (not on AC previous; follows w/ cardiologist at [facility]), DM, recent SDH s/p left craniotomy w/ evacuation 1/4/2018, cervical cancer s/p hysterectomy various years ago who presented again for recurrent mechanical fall.  Pt also noted to be hypoxic requiring NC (up to 4-5 L initially).  She was diuresed w/ IV lasix w/ good response (~ 800-1L out daily) and successfully weaned to 2L NC.  Cardiology consulted for further management of acute on chronic diastolic heart failure.  Difficult to obtain much history from patient, however states that her breathing has improved.  Otherwise no other concerning complaints.     Of note, patient admitted various times these past two months for various things (PNA, UTI, SDH, fluid overload) requiring IV abx, lasix.           PMH:   SDH (subdural hematoma)  Cervical cancer  Endocarditis  Hypertension  Diabetes mellitus  Atrial fibrillation      PSH:   H/O: hysterectomy  History of tracheostomy      Medications:   acetaminophen   Tablet. 650 milliGRAM(s) Oral every 6 hours PRN  aluminum hydroxide/magnesium hydroxide/simethicone Suspension 30 milliLiter(s) Oral every 4 hours PRN  aluminum hydroxide/magnesium hydroxide/simethicone Suspension 30 milliLiter(s) Oral every 6 hours PRN  dextrose 5%. 1000 milliLiter(s) IV Continuous <Continuous>  dextrose 50% Injectable 12.5 Gram(s) IV Push once  dextrose 50% Injectable 25 Gram(s) IV Push once  dextrose 50% Injectable 25 Gram(s) IV Push once  dextrose Gel 1 Dose(s) Oral once PRN  furosemide   Injectable 80 milliGRAM(s) IV Push two times a day  glucagon  Injectable 1 milliGRAM(s) IntraMuscular once PRN  insulin glargine Injectable (LANTUS) 8 Unit(s) SubCutaneous at bedtime  insulin lispro (HumaLOG) corrective regimen sliding scale   SubCutaneous three times a day before meals  insulin lispro (HumaLOG) corrective regimen sliding scale   SubCutaneous at bedtime  insulin lispro Injectable (HumaLOG) 5 Unit(s) SubCutaneous three times a day before meals  lacosamide 150 milliGRAM(s) Oral two times a day  ondansetron    Tablet 4 milliGRAM(s) Oral once      Allergies:  No Known Allergies      FAMILY HISTORY:  No pertinent family history in first degree relatives      Social History:  Smoking History:  Alcohol Use:  Drug Use:    REVIEW OF SYSTEMS:  CONSTITUTIONAL: No weakness, fevers or chills  EYES/ENT: No visual changes;  No dysphagia  NECK: No pain or stiffness  RESPIRATORY: +shortness of breath; however improved   CARDIOVASCULAR: No chest pain or palpitations; No lower extremity edema  GASTROINTESTINAL: No abdominal or epigastric pain. No nausea, vomiting, or hematemesis; No diarrhea or constipation. No melena or hematochezia.  BACK: No back pain  GENITOURINARY: No dysuria, frequency or hematuria  NEUROLOGICAL: No numbness or weakness  SKIN: No itching, burning, rashes, or lesions   All other review of systems is negative unless indicated above.    Physical Exam:  T(F): 97.7 (02-21), Max: 99 (02-21)  HR: 55 (02-21) (44 - 79)  BP: 128/72 (02-21) (126/66 - 169/75)  RR: 18 (02-21)  SpO2: 100% (02-21)    GENERAL: No acute distress, well-developed  HEAD:  Atraumatic, Normocephalic  ENT: EOMI, PERRLA, conjunctiva and sclera clear, Neck supple, +JVD bilaterally, moist mucosa  CHEST/LUNG:  Diffuse crackles w/ no wheezing/rhonchi   BACK: No spinal tenderness  HEART: bradycardic w/ reg rhythm; II/VI systolic murmur audible in 2nd R intercostal space radiating to left sternal border and apex, no rubs, or gallops (attending correction - gr iii/vi harsh late peaking systolic murmur base to neck, LSB, apex consistent with severe AS and Galavardin's murmur as well as MR.  ABDOMEN: Soft, Nontender, Nondistended; Bowel sounds present  EXTREMITIES:  No clubbing, cyanosis, or edema  PSYCH: Nl behavior, nl affect  NEUROLOGY: AAOx3, non-focal, cranial nerves intact  SKIN: Normal color, No rashes or lesions  LINES:    Cardiovascular Diagnostic Testing:    ECG: slow Atypical flutter,  HR 50s      Echo:    < from: Transthoracic Echocardiogram (01.05.18 @ 11:32) >  1. Mitral annular calcification and calcified mitral  leaflets with decreased diastolic opening. Minimal mitral  regurgitation. Mean transmitral valve gradient equals 5 mm  Hg, consistent with moderate mitral stenosis. (HRabout 80  bpm)  2. Aortic valve not well visualized; appears to be a  calcified trileaflet valve with decreased opening. Peak  transaortic valve gradient equals 36 mm Hg, mean  transaortic valve gradient equals 23 mm Hg, estimated  aortic valve area equals 1.2 sqcm (by continuity equation),  aortic valve velocity time integral equals 59 cm,  consistent with moderate aortic stenosis. Consider  additional imaging of the aortic valve such as with  additional TTE or JOY imaging if clinically indicated. No  aortic valve regurgitation seen.  3. Endocardium not well visualized; grossly hyperdynamic  left ventricular systolic function. EF approximately 75% by  visual estimation.  4. The right ventricle is not well visualized; grossly  normal right ventricular systolic function.  *** No previous Echo exam.    < from: Xray Chest 1 View- PORTABLE-Urgent (02.14.18 @ 13:57) >    IMPRESSION:  Bilateral patchy opacities consistent pulmonary edema that   is grossly unchanged from previous radiograph.  New small bilateral pleural effusions.    CT Head No Cont (02.11.18 @ 21:22) >    Imaging:    Impression:    Slight decrease in size of acute component within left holohemispheric   subdural collection. Stable minimal rightward shift.      Interpretation of Telemetry:  aflutter, HR 40s-50s       Labs: Personally reviewed                        9.6    3.42  )-----------( 148      ( 21 Feb 2018 09:25 )             31.4     02-21    145  |  98  |  21  ----------------------------<  163<H>  3.2<L>   |  38<H>  |  1.17    Ca    9.8      21 Feb 2018 09:27  Phos  2.7     02-21  Mg     2.0     02-21    TPro  6.6  /  Alb  3.0<L>  /  TBili  0.4  /  DBili  x   /  AST  34  /  ALT  35  /  AlkPhos  113  02-21    Assessment    89 yo woman w/ hx of A-fib/flutter (not on AC previous, unclear why), DM, recent SDH s/p left craniotomy w/ evacuation 1/4/2018, cervical cancer s/p hysterectomy various years ago who presented again for recurrent mechanical fall.  In acute on chronic diastolic heart failure.  Likely hypertension (SBP 150s-160s mostly looking back) induced in the setting of not being optimized on her diuretic dose (only on 20 mg daily PO).  Also has valvulopathy (Mod-MS) which could contribute as well.  Clinically improving.  Cr slightly uptrended w/ worsening bicarb.      Plan  - continue IV furosemide BID   - cont to monitor I/Os, UOP  - fluid restrict to 1.5L daily   - re-start losartan 50 mg daily (home med) for better afterload reduction  - f/u TTE results.       Vinay Rodriguez MD   [facility] [id number] Ms. Koo is an 88 yo woman w/ hx of A-fib (not on AC previous; follows w/ cardiologist at [facility]), DM, recent SDH s/p left craniotomy w/ evacuation 1/4/2018, cervical cancer s/p hysterectomy various years ago who presented again for recurrent mechanical fall.  Pt also noted to be hypoxic requiring NC (up to 4-5 L initially).  She was diuresed w/ IV lasix w/ good response (~ 800-1L out daily) and successfully weaned to 2L NC.  Cardiology consulted for further management of acute on chronic diastolic heart failure.  Difficult to obtain much history from patient, however states that her breathing has improved.  Otherwise no other concerning complaints.     Of note, patient admitted various times these past two months for various things (PNA, UTI, SDH, fluid overload) requiring IV abx, lasix.           PMH:   SDH (subdural hematoma)  Cervical cancer  Endocarditis  Hypertension  Diabetes mellitus  Atrial fibrillation      PSH:   H/O: hysterectomy  History of tracheostomy      Medications:   acetaminophen   Tablet. 650 milliGRAM(s) Oral every 6 hours PRN  aluminum hydroxide/magnesium hydroxide/simethicone Suspension 30 milliLiter(s) Oral every 4 hours PRN  aluminum hydroxide/magnesium hydroxide/simethicone Suspension 30 milliLiter(s) Oral every 6 hours PRN  dextrose 5%. 1000 milliLiter(s) IV Continuous <Continuous>  dextrose 50% Injectable 12.5 Gram(s) IV Push once  dextrose 50% Injectable 25 Gram(s) IV Push once  dextrose 50% Injectable 25 Gram(s) IV Push once  dextrose Gel 1 Dose(s) Oral once PRN  furosemide   Injectable 80 milliGRAM(s) IV Push two times a day  glucagon  Injectable 1 milliGRAM(s) IntraMuscular once PRN  insulin glargine Injectable (LANTUS) 8 Unit(s) SubCutaneous at bedtime  insulin lispro (HumaLOG) corrective regimen sliding scale   SubCutaneous three times a day before meals  insulin lispro (HumaLOG) corrective regimen sliding scale   SubCutaneous at bedtime  insulin lispro Injectable (HumaLOG) 5 Unit(s) SubCutaneous three times a day before meals  lacosamide 150 milliGRAM(s) Oral two times a day  ondansetron    Tablet 4 milliGRAM(s) Oral once      Allergies:  No Known Allergies      FAMILY HISTORY:  No pertinent family history in first degree relatives      Social History:  Smoking History:  Alcohol Use:  Drug Use:    REVIEW OF SYSTEMS:  CONSTITUTIONAL: No weakness, fevers or chills  EYES/ENT: No visual changes;  No dysphagia  NECK: No pain or stiffness  RESPIRATORY: +shortness of breath; however improved   CARDIOVASCULAR: No chest pain or palpitations; No lower extremity edema  GASTROINTESTINAL: No abdominal or epigastric pain. No nausea, vomiting, or hematemesis; No diarrhea or constipation. No melena or hematochezia.  BACK: No back pain  GENITOURINARY: No dysuria, frequency or hematuria  NEUROLOGICAL: No numbness or weakness  SKIN: No itching, burning, rashes, or lesions   All other review of systems is negative unless indicated above.    Physical Exam:  T(F): 97.7 (02-21), Max: 99 (02-21)  HR: 55 (02-21) (44 - 79)  BP: 128/72 (02-21) (126/66 - 169/75)  RR: 18 (02-21)  SpO2: 100% (02-21)    GENERAL: No acute distress, well-developed  HEAD:  Atraumatic, Normocephalic  ENT: EOMI, PERRLA, conjunctiva and sclera clear, Neck supple, +JVD bilaterally, moist mucosa  CHEST/LUNG:  Diffuse crackles w/ no wheezing/rhonchi   BACK: No spinal tenderness  HEART: bradycardic w/ reg rhythm; II/VI systolic murmur audible in 2nd R intercostal space radiating to left sternal border and apex, no rubs, or gallops (attending correction - gr iii/vi harsh late peaking systolic murmur base to neck, LSB, apex consistent with severe AS and Galavardin's murmur as well as MR.  ABDOMEN: Soft, Nontender, Nondistended; Bowel sounds present  EXTREMITIES:  No clubbing, cyanosis, or edema  PSYCH: Nl behavior, nl affect  NEUROLOGY: AAOx3, non-focal, cranial nerves intact  SKIN: Normal color, No rashes or lesions  LINES:    Cardiovascular Diagnostic Testing:    ECG: slow Atypical flutter,  HR 50s      Echo:    < from: Transthoracic Echocardiogram (01.05.18 @ 11:32) >  1. Mitral annular calcification and calcified mitral  leaflets with decreased diastolic opening. Minimal mitral  regurgitation. Mean transmitral valve gradient equals 5 mm  Hg, consistent with moderate mitral stenosis. (HRabout 80  bpm)  2. Aortic valve not well visualized; appears to be a  calcified trileaflet valve with decreased opening. Peak  transaortic valve gradient equals 36 mm Hg, mean  transaortic valve gradient equals 23 mm Hg, estimated  aortic valve area equals 1.2 sqcm (by continuity equation),  aortic valve velocity time integral equals 59 cm,  consistent with moderate aortic stenosis. Consider  additional imaging of the aortic valve such as with  additional TTE or JOY imaging if clinically indicated. No  aortic valve regurgitation seen.  3. Endocardium not well visualized; grossly hyperdynamic  left ventricular systolic function. EF approximately 75% by  visual estimation.  4. The right ventricle is not well visualized; grossly  normal right ventricular systolic function.  *** No previous Echo exam.    < from: Xray Chest 1 View- PORTABLE-Urgent (02.14.18 @ 13:57) >    IMPRESSION:  Bilateral patchy opacities consistent pulmonary edema that   is grossly unchanged from previous radiograph.  New small bilateral pleural effusions.    CT Head No Cont (02.11.18 @ 21:22) >    Imaging:    Impression:    Slight decrease in size of acute component within left holohemispheric   subdural collection. Stable minimal rightward shift.      Interpretation of Telemetry:  aflutter, HR 40s-50s       Labs: Personally reviewed                        9.6    3.42  )-----------( 148      ( 21 Feb 2018 09:25 )             31.4     02-21    145  |  98  |  21  ----------------------------<  163<H>  3.2<L>   |  38<H>  |  1.17    Ca    9.8      21 Feb 2018 09:27  Phos  2.7     02-21  Mg     2.0     02-21    TPro  6.6  /  Alb  3.0<L>  /  TBili  0.4  /  DBili  x   /  AST  34  /  ALT  35  /  AlkPhos  113  02-21    Assessment    88 yo woman w/ hx of A-fib/flutter (not on AC previous, unclear why), mod AS DM, recent SDH s/p left craniotomy w/ evacuation 1/4/2018, cervical cancer s/p hysterectomy various years ago who presented again for recurrent mechanical fall.  In acute on chronic diastolic heart failure.  Likely hypertension (SBP 150s-160s mostly looking back) induced in the setting of not being optimized on her diuretic dose (only on 20 mg daily PO).  Also has valvulopathy (Mod-MS) which could contribute as well.  Clinically improving.  Cr slightly up-trended w/ worsening bicarb.      Plan  - continue IV furosemide 80 BID   - cont to monitor I/Os, UOP  - fluid restrict to 1.5L daily   - re-start losartan 50 mg daily (home med) for better afterload reduction  - f/u TTE results.       Vinay Rodriguez MD   [facility] [id number]

## 2018-02-21 NOTE — PROGRESS NOTE ADULT - PROBLEM SELECTOR PLAN 2
- spiked a fever on 2/14 with no found source: UA not convincing for UTI and pt s/p 3 day course of CTX, CXR: no focal consolidation, B/C from 2/14 NTD  - RUQ U/S obtained in setting of transaminitis with no evidence of stones, and no tenderness to suggest cholecystitis, however mild thickening and gallbladder sludge.   - afebrile past 48 hours  - Day 7/7 of zosyn - etiology initially thought to be 2/2 congestive hepatopathy   - RUQ U/S not revealing of stones or liver pathology and exam unremarkable. However, sludge and mild gallbladder wall thickening.    - will continue to monitor, transaminases downtrending

## 2018-02-21 NOTE — PROGRESS NOTE ADULT - PROBLEM SELECTOR PLAN 3
- etiology initially thought to be 2/2 congestive hepatopathy   - RUQ U/S not revealing of stones or liver pathology and exam unremarkable. However, sludge and mild gallbladder wall thickening.    - will continue to monitor, transaminases downtrending Resolved, patient has been afebrile and is s/p 7 days of zosyn  - spiked a fever on 2/14 with no found source: UA not convincing for UTI and pt s/p 3 day course of CTX, CXR: no focal consolidation, B/C from 2/14 NTD  - RUQ U/S obtained in setting of transaminitis with no evidence of stones, and no tenderness to suggest cholecystitis, however mild thickening and gallbladder sludge.

## 2018-02-21 NOTE — PROGRESS NOTE ADULT - SUBJECTIVE AND OBJECTIVE BOX
Patient is a 89y old  Female who presents with a chief complaint of fall and altered mental status (18 Feb 2018 13:32)      SUBJECTIVE / OVERNIGHT EVENTS:  Weaned oxygen to 2L yesterday evening.  HTN overnight but resolved after lasix.  Continues to have SOB, worse with laying flat. Has a productive cough with clear sputum. She denies CP, abdominal pain, nausea, vomiting, diarrhea, constipation, LE edema.     Tele- afib rate 40-50s overnight    MEDICATIONS  (STANDING):  dextrose 5%. 1000 milliLiter(s) (50 mL/Hr) IV Continuous <Continuous>  dextrose 50% Injectable 12.5 Gram(s) IV Push once  dextrose 50% Injectable 25 Gram(s) IV Push once  dextrose 50% Injectable 25 Gram(s) IV Push once  furosemide   Injectable 80 milliGRAM(s) IV Push two times a day  insulin glargine Injectable (LANTUS) 8 Unit(s) SubCutaneous at bedtime  insulin lispro (HumaLOG) corrective regimen sliding scale   SubCutaneous three times a day before meals  insulin lispro (HumaLOG) corrective regimen sliding scale   SubCutaneous at bedtime  insulin lispro Injectable (HumaLOG) 5 Unit(s) SubCutaneous three times a day before meals  lacosamide 150 milliGRAM(s) Oral two times a day  ondansetron    Tablet 4 milliGRAM(s) Oral once    MEDICATIONS  (PRN):  acetaminophen   Tablet. 650 milliGRAM(s) Oral every 6 hours PRN mild to moderate pain  aluminum hydroxide/magnesium hydroxide/simethicone Suspension 30 milliLiter(s) Oral every 4 hours PRN Dyspepsia  aluminum hydroxide/magnesium hydroxide/simethicone Suspension 30 milliLiter(s) Oral every 6 hours PRN Dyspepsia  dextrose Gel 1 Dose(s) Oral once PRN Blood Glucose LESS THAN 70 milliGRAM(s)/deciliter  glucagon  Injectable 1 milliGRAM(s) IntraMuscular once PRN Glucose LESS THAN 70 milligrams/deciliter      CAPILLARY BLOOD GLUCOSE      POCT Blood Glucose.: 176 mg/dL (21 Feb 2018 07:19)  POCT Blood Glucose.: 205 mg/dL (20 Feb 2018 21:28)  POCT Blood Glucose.: 127 mg/dL (20 Feb 2018 16:36)  POCT Blood Glucose.: 114 mg/dL (20 Feb 2018 11:23)    I&O's Summary    20 Feb 2018 07:01  -  21 Feb 2018 07:00  --------------------------------------------------------  IN: 580 mL / OUT: 700 mL / NET: -120 mL      Vital Signs Last 24 Hrs  T(C): 37.2 (21 Feb 2018 04:23), Max: 37.2 (21 Feb 2018 04:23)  T(F): 99 (21 Feb 2018 04:23), Max: 99 (21 Feb 2018 04:23)  HR: 54 (21 Feb 2018 05:46) (44 - 79)  BP: 126/66 (21 Feb 2018 05:46) (126/66 - 169/75)  BP(mean): --  RR: 18 (21 Feb 2018 05:26) (18 - 20)  SpO2: 97% (21 Feb 2018 05:14) (94% - 99%)      PHYSICAL EXAM:  GENERAL: tachypneic, on 2L O2 nasal cannula  HEAD:  Atraumatic, Normocephalic  EYES: EOMI, conjunctiva and sclera clear  NECK: Supple, No JVD  CHEST/LUNG: crackles through lung fields up to mid lung, no wheezing  HEART: Regular rate and rhythm; Grade III systolic murmur loudest at right sternal border but heard throughout  ABDOMEN: Soft, Nontender, Nondistended; Bowel sounds present  EXTREMITIES:  peripheral pulses unable to be palpated, no LE edema, No clubbing, cyanosis  NEUROLOGY: A&Ox2  SKIN: No rashes or lesions      LABS:                                  10.0   3.36  )-----------( 152      ( 20 Feb 2018 09:09 )             32.6     02-20    147<H>  |  100  |  20  ----------------------------<  131<H>  3.5   |  34<H>  |  1.03    Ca    10.0      20 Feb 2018 09:06  Phos  3.1     02-20  Mg     1.9     02-20    TPro  6.6  /  Alb  2.6<L>  /  TBili  0.5  /  DBili  x   /  AST  37  /  ALT  44  /  AlkPhos  119  02-20    RESPIRATORY  VENT:    ABG:     VBG:     RADIOLOGY & ADDITIONAL TESTS:    Imaging Personally Reviewed:    Consultant(s) Notes Reviewed:      Care Discussed with Consultants/Other Providers: Patient is a 89y old  Female who presents with a chief complaint of fall and altered mental status (18 Feb 2018 13:32)      SUBJECTIVE / OVERNIGHT EVENTS:  Weaned oxygen to 2L yesterday evening.  HTN overnight but resolved after lasix.  Continues to have SOB, worse with laying flat. Has a productive cough with clear sputum. She denies CP, abdominal pain, nausea, vomiting, diarrhea, constipation, LE edema.     Tele- afib rate 40-50s overnight    MEDICATIONS  (STANDING):  dextrose 5%. 1000 milliLiter(s) (50 mL/Hr) IV Continuous <Continuous>  dextrose 50% Injectable 12.5 Gram(s) IV Push once  dextrose 50% Injectable 25 Gram(s) IV Push once  dextrose 50% Injectable 25 Gram(s) IV Push once  furosemide   Injectable 80 milliGRAM(s) IV Push two times a day  insulin glargine Injectable (LANTUS) 8 Unit(s) SubCutaneous at bedtime  insulin lispro (HumaLOG) corrective regimen sliding scale   SubCutaneous three times a day before meals  insulin lispro (HumaLOG) corrective regimen sliding scale   SubCutaneous at bedtime  insulin lispro Injectable (HumaLOG) 5 Unit(s) SubCutaneous three times a day before meals  lacosamide 150 milliGRAM(s) Oral two times a day  ondansetron    Tablet 4 milliGRAM(s) Oral once    MEDICATIONS  (PRN):  acetaminophen   Tablet. 650 milliGRAM(s) Oral every 6 hours PRN mild to moderate pain  aluminum hydroxide/magnesium hydroxide/simethicone Suspension 30 milliLiter(s) Oral every 4 hours PRN Dyspepsia  aluminum hydroxide/magnesium hydroxide/simethicone Suspension 30 milliLiter(s) Oral every 6 hours PRN Dyspepsia  dextrose Gel 1 Dose(s) Oral once PRN Blood Glucose LESS THAN 70 milliGRAM(s)/deciliter  glucagon  Injectable 1 milliGRAM(s) IntraMuscular once PRN Glucose LESS THAN 70 milligrams/deciliter      CAPILLARY BLOOD GLUCOSE      POCT Blood Glucose.: 176 mg/dL (21 Feb 2018 07:19)  POCT Blood Glucose.: 205 mg/dL (20 Feb 2018 21:28)  POCT Blood Glucose.: 127 mg/dL (20 Feb 2018 16:36)  POCT Blood Glucose.: 114 mg/dL (20 Feb 2018 11:23)    I&O's Summary    20 Feb 2018 07:01  -  21 Feb 2018 07:00  --------------------------------------------------------  IN: 580 mL / OUT: 700 mL / NET: -120 mL      Vital Signs Last 24 Hrs  T(C): 37.2 (21 Feb 2018 04:23), Max: 37.2 (21 Feb 2018 04:23)  T(F): 99 (21 Feb 2018 04:23), Max: 99 (21 Feb 2018 04:23)  HR: 54 (21 Feb 2018 05:46) (44 - 79)  BP: 126/66 (21 Feb 2018 05:46) (126/66 - 169/75)  BP(mean): --  RR: 18 (21 Feb 2018 05:26) (18 - 20)  SpO2: 97% (21 Feb 2018 05:14) (94% - 99%)      PHYSICAL EXAM:  GENERAL: tachypneic, on 2L O2 nasal cannula  HEAD:  Atraumatic, Normocephalic  EYES: EOMI, conjunctiva and sclera clear  NECK: Supple, No JVD  CHEST/LUNG: crackles through lung fields up to mid lung, no wheezing  HEART: Regular rate and rhythm; Grade III systolic murmur loudest at right sternal border but heard throughout  ABDOMEN: Soft, Nontender, Nondistended; Bowel sounds present  EXTREMITIES:  peripheral pulses unable to be palpated, no LE edema, No clubbing, cyanosis  NEUROLOGY: A&Ox2  SKIN: No rashes or lesions      LABS:                                9.6    3.42  )-----------( 148      ( 21 Feb 2018 09:25 )             31.4     02-21    145  |  98  |  21  ----------------------------<  163<H>  3.2<L>   |  38<H>  |  1.17    Ca    9.8      21 Feb 2018 09:27  Phos  2.7     02-21  Mg     2.0     02-21    TPro  6.6  /  Alb  3.0<L>  /  TBili  0.4  /  DBili  x   /  AST  34  /  ALT  35  /  AlkPhos  113  02-21      RESPIRATORY  VENT:    ABG:     VBG:     RADIOLOGY & ADDITIONAL TESTS:    Imaging Personally Reviewed:    Consultant(s) Notes Reviewed:      Care Discussed with Consultants/Other Providers:

## 2018-02-22 LAB
ANION GAP SERPL CALC-SCNC: 15 MMOL/L — SIGNIFICANT CHANGE UP (ref 5–17)
BUN SERPL-MCNC: 20 MG/DL — SIGNIFICANT CHANGE UP (ref 7–23)
CALCIUM SERPL-MCNC: 9.5 MG/DL — SIGNIFICANT CHANGE UP (ref 8.4–10.5)
CHLORIDE SERPL-SCNC: 98 MMOL/L — SIGNIFICANT CHANGE UP (ref 96–108)
CO2 SERPL-SCNC: 33 MMOL/L — HIGH (ref 22–31)
CREAT SERPL-MCNC: 1.04 MG/DL — SIGNIFICANT CHANGE UP (ref 0.5–1.3)
GAS PNL BLDV: SIGNIFICANT CHANGE UP
GLUCOSE BLDC GLUCOMTR-MCNC: 176 MG/DL — HIGH (ref 70–99)
GLUCOSE BLDC GLUCOMTR-MCNC: 180 MG/DL — HIGH (ref 70–99)
GLUCOSE BLDC GLUCOMTR-MCNC: 182 MG/DL — HIGH (ref 70–99)
GLUCOSE BLDC GLUCOMTR-MCNC: 189 MG/DL — HIGH (ref 70–99)
GLUCOSE BLDC GLUCOMTR-MCNC: 196 MG/DL — HIGH (ref 70–99)
GLUCOSE BLDC GLUCOMTR-MCNC: 68 MG/DL — LOW (ref 70–99)
GLUCOSE SERPL-MCNC: 194 MG/DL — HIGH (ref 70–99)
HCT VFR BLD CALC: 33.6 % — LOW (ref 34.5–45)
HGB BLD-MCNC: 10.2 G/DL — LOW (ref 11.5–15.5)
MAGNESIUM SERPL-MCNC: 2 MG/DL — SIGNIFICANT CHANGE UP (ref 1.6–2.6)
MCHC RBC-ENTMCNC: 28.7 PG — SIGNIFICANT CHANGE UP (ref 27–34)
MCHC RBC-ENTMCNC: 30.4 GM/DL — LOW (ref 32–36)
MCV RBC AUTO: 94.6 FL — SIGNIFICANT CHANGE UP (ref 80–100)
PHOSPHATE SERPL-MCNC: 2.6 MG/DL — SIGNIFICANT CHANGE UP (ref 2.5–4.5)
PLATELET # BLD AUTO: 139 K/UL — LOW (ref 150–400)
POTASSIUM SERPL-MCNC: 3.9 MMOL/L — SIGNIFICANT CHANGE UP (ref 3.5–5.3)
POTASSIUM SERPL-SCNC: 3.9 MMOL/L — SIGNIFICANT CHANGE UP (ref 3.5–5.3)
RBC # BLD: 3.55 M/UL — LOW (ref 3.8–5.2)
RBC # FLD: 17.3 % — HIGH (ref 10.3–14.5)
SODIUM SERPL-SCNC: 146 MMOL/L — HIGH (ref 135–145)
WBC # BLD: 3.6 K/UL — LOW (ref 3.8–10.5)
WBC # FLD AUTO: 3.6 K/UL — LOW (ref 3.8–10.5)

## 2018-02-22 PROCEDURE — 99233 SBSQ HOSP IP/OBS HIGH 50: CPT | Mod: GC

## 2018-02-22 RX ORDER — LOSARTAN POTASSIUM 100 MG/1
100 TABLET, FILM COATED ORAL DAILY
Qty: 0 | Refills: 0 | Status: DISCONTINUED | OUTPATIENT
Start: 2018-02-22 | End: 2018-02-27

## 2018-02-22 RX ORDER — QUETIAPINE FUMARATE 200 MG/1
25 TABLET, FILM COATED ORAL ONCE
Qty: 0 | Refills: 0 | Status: COMPLETED | OUTPATIENT
Start: 2018-02-22 | End: 2018-02-22

## 2018-02-22 RX ADMIN — LACOSAMIDE 150 MILLIGRAM(S): 50 TABLET ORAL at 17:28

## 2018-02-22 RX ADMIN — QUETIAPINE FUMARATE 25 MILLIGRAM(S): 200 TABLET, FILM COATED ORAL at 10:38

## 2018-02-22 RX ADMIN — INSULIN GLARGINE 8 UNIT(S): 100 INJECTION, SOLUTION SUBCUTANEOUS at 21:45

## 2018-02-22 RX ADMIN — LACOSAMIDE 150 MILLIGRAM(S): 50 TABLET ORAL at 05:35

## 2018-02-22 RX ADMIN — Medication 30 MILLILITER(S): at 01:11

## 2018-02-22 RX ADMIN — LOSARTAN POTASSIUM 50 MILLIGRAM(S): 100 TABLET, FILM COATED ORAL at 05:27

## 2018-02-22 RX ADMIN — Medication 80 MILLIGRAM(S): at 03:42

## 2018-02-22 RX ADMIN — Medication 80 MILLIGRAM(S): at 18:33

## 2018-02-22 RX ADMIN — Medication 2: at 07:57

## 2018-02-22 RX ADMIN — Medication 2: at 12:10

## 2018-02-22 RX ADMIN — Medication 30 MILLILITER(S): at 08:30

## 2018-02-22 RX ADMIN — Medication 5 UNIT(S): at 07:57

## 2018-02-22 RX ADMIN — Medication 5 UNIT(S): at 12:10

## 2018-02-22 NOTE — PROGRESS NOTE ADULT - PROBLEM SELECTOR PLAN 7
resolved  - creatinine stable in the setting of diuresis   - renally dose meds  - avoid nephrotoxic agents

## 2018-02-22 NOTE — PROGRESS NOTE ADULT - ATTENDING COMMENTS
89 year old woman from nursing home for fall, altered mental status, recent evacuation of subdural hematoma with chronic slow atrial fibrillation off anticoagulation at least since SDH. Manifests signs of congestive heart failure. On exam stable blood pressure, mildly slow heart rate, prominent neck veins, rales at bases. Cardiac exam as above consistent with significant AS, no findings to suggest significant mitral stenosis.    Need diuresis for pulmonary vascular congestion, pulmonary hypertension. Doubt observation of mitral gradient on echo of clinical significance. May have low gradient severe AS, but based on overall clinical status this will need to be managed medically.

## 2018-02-22 NOTE — PROGRESS NOTE ADULT - PROBLEM SELECTOR PLAN 9
- As per conversation with both daughters --- patient is FULL CODE  - DVT PPx: SCDs     Dispo:  pending volume optimization and oxygen requirements. Per PT dispo to rehab, but pt prefers home with home PT.     Nuzhat Avalos, PGY-1   [phone]

## 2018-02-22 NOTE — PROGRESS NOTE ADULT - SUBJECTIVE AND OBJECTIVE BOX
Patient seen and examined at bedside.      Medications:  acetaminophen   Tablet. 650 milliGRAM(s) Oral every 6 hours PRN  aluminum hydroxide/magnesium hydroxide/simethicone Suspension 30 milliLiter(s) Oral every 4 hours PRN  aluminum hydroxide/magnesium hydroxide/simethicone Suspension 30 milliLiter(s) Oral every 6 hours PRN  dextrose 5%. 1000 milliLiter(s) IV Continuous <Continuous>  dextrose 50% Injectable 12.5 Gram(s) IV Push once  dextrose 50% Injectable 25 Gram(s) IV Push once  dextrose 50% Injectable 25 Gram(s) IV Push once  dextrose Gel 1 Dose(s) Oral once PRN  furosemide   Injectable 80 milliGRAM(s) IV Push two times a day  glucagon  Injectable 1 milliGRAM(s) IntraMuscular once PRN  insulin glargine Injectable (LANTUS) 8 Unit(s) SubCutaneous at bedtime  insulin lispro (HumaLOG) corrective regimen sliding scale   SubCutaneous three times a day before meals  insulin lispro (HumaLOG) corrective regimen sliding scale   SubCutaneous at bedtime  insulin lispro Injectable (HumaLOG) 5 Unit(s) SubCutaneous three times a day before meals  lacosamide 150 milliGRAM(s) Oral two times a day  losartan 50 milliGRAM(s) Oral daily  ondansetron    Tablet 4 milliGRAM(s) Oral once      PAST MEDICAL & SURGICAL HISTORY:  SDH (subdural hematoma)  Cervical cancer: Stage I per the daughter s/p hysterectomy april 2000 (complicated by: adhesions / pna)  Endocarditis: 2010 (treated w/ antibiotics in [facility])  Hypertension  Diabetes mellitus  Atrial fibrillation: on aspirin (last taken 1/2/2018)  H/O: hysterectomy  History of tracheostomy: april 2000 as a complication from her hysterectomy - developed pna and was unable to be weaned off the vent    REVIEW OF SYSTEMS:    CONSTITUTIONAL: No weakness, fevers or chills  EYES/ENT: No visual changes;  No vertigo or throat pain   NECK: No pain or stiffness  RESPIRATORY: No cough, wheezing, hemoptysis; +shortness of breath  CARDIOVASCULAR: No chest pain or palpitations  GASTROINTESTINAL: No abdominal or epigastric pain. No nausea, vomiting, or hematemesis; No diarrhea or constipation. No melena or hematochezia.  GENITOURINARY: No dysuria, frequency or hematuria  NEUROLOGICAL: No numbness or weakness  SKIN: No itching, rashes      Vitals:  T(F): 98 (02-22), Max: 98 (02-21)  HR: 46 (02-22) (46 - 56)  BP: 160/50 (02-22) (124/66 - 207/69)  RR: 22 (02-22)  SpO2: 95% (02-22)  I&O's Summary    21 Feb 2018 07:01  -  22 Feb 2018 07:00  --------------------------------------------------------  IN: 600 mL / OUT: 775 mL / NET: -175 mL    22 Feb 2018 07:01  -  22 Feb 2018 11:10  --------------------------------------------------------  IN: 0 mL / OUT: 200 mL / NET: -200 mL        Physical Exam:  Appearance: No acute distress; well appearing  Eyes: PERRL, EOMI, pink conjunctiva  HENT: Normal oral mucosa  Cardiovascular: irregular rhythm, bradycardic unable to appreciate S1, soft S2, harsh iii/vi Systolic murmur audible throughout precordium radiating to carotids/apex; c/w severe AS no rubs, or gallops; +JVD  Respiratory: Clear to auscultation bilaterally  Gastrointestinal: soft, non-tender, non-distended with normal bowel sounds  Musculoskeletal: No clubbing; no joint deformity; +pitting edema RLE    Neurologic: Non-focal  Lymphatic: No lymphadenopathy  Psychiatry: AAOx3, mood & affect appropriate  Skin: No rashes, ecchymoses, or cyanosis                          10.2   3.60  )-----------( 139      ( 22 Feb 2018 07:41 )             33.6     02-22    146<H>  |  98  |  20  ----------------------------<  194<H>  3.9   |  33<H>  |  1.04    Ca    9.5      22 Feb 2018 07:38  Phos  2.6     02-22  Mg     2.0     02-22    TPro  6.6  /  Alb  3.0<L>  /  TBili  0.4  /  DBili  x   /  AST  34  /  ALT  35  /  AlkPhos  113  02-21    Echo:  < from: Transthoracic Echocardiogram (02.21.18 @ 14:38) >  1. Calcified trileaflet aortic valve with decreased  opening. Peak transaortic valve gradient equals 55 mm Hg,  mean transaortic valve gradient equals 28 mm Hg, estimated  aortic valve area equals 0.7 sqcm (by continuity equation),  aortic valve velocity time integral equals 82 cm,  consistent with severe aortic stenosis. Minimal aortic  regurgitation.  2. Normal left ventricular systolic function.  3. The right ventricle is not well visualized; grossly  normal right ventricular systolic function.  4. Normal tricuspid valve. Mild-moderate tricuspid  regurgitation.  5. Estimated pulmonary artery systolic pressure equals 54  mm Hg, assuming right atrial pressure equals 15 mm Hg,  consistent with moderate pulmonary pressures.    < end of copied text >      Interpretation of Telemetry: afib, HR 40s-50s     Assessment     88 yo woman w/ hx of A-fib/flutter (not on AC previous, unclear why), mod AS DM, recent SDH s/p left craniotomy w/ evacuation 1/4/2018, cervical cancer s/p hysterectomy various years ago who presented again for recurrent mechanical fall.  In acute on chronic diastolic heart failure.  Likely hypertension (SBP 150s-160s mostly looking back) induced in the setting of not being optimized on her diuretic dose (only on 20 mg daily PO).  Also has valvulopathy (Mod-MS) which could contribute as well.  Clinically improving.  Cr slightly up-trended w/ worsening bicarb.      Plan  - continue IV furosemide 80 BID   - cont to monitor I/Os, UOP  - fluid restrict to 1.5L daily   - increase losartan to 100 mg daily  for better afterload reduction  - f/u TTE results.       Vinay Rodriguez MD   [facility] [id number] Patient seen and examined at bedside. Still w/ shortness of breath.  No CP, palpitations or any other concerning sx's.        Medications:  acetaminophen   Tablet. 650 milliGRAM(s) Oral every 6 hours PRN  aluminum hydroxide/magnesium hydroxide/simethicone Suspension 30 milliLiter(s) Oral every 4 hours PRN  aluminum hydroxide/magnesium hydroxide/simethicone Suspension 30 milliLiter(s) Oral every 6 hours PRN  dextrose 5%. 1000 milliLiter(s) IV Continuous <Continuous>  dextrose 50% Injectable 12.5 Gram(s) IV Push once  dextrose 50% Injectable 25 Gram(s) IV Push once  dextrose 50% Injectable 25 Gram(s) IV Push once  dextrose Gel 1 Dose(s) Oral once PRN  furosemide   Injectable 80 milliGRAM(s) IV Push two times a day  glucagon  Injectable 1 milliGRAM(s) IntraMuscular once PRN  insulin glargine Injectable (LANTUS) 8 Unit(s) SubCutaneous at bedtime  insulin lispro (HumaLOG) corrective regimen sliding scale   SubCutaneous three times a day before meals  insulin lispro (HumaLOG) corrective regimen sliding scale   SubCutaneous at bedtime  insulin lispro Injectable (HumaLOG) 5 Unit(s) SubCutaneous three times a day before meals  lacosamide 150 milliGRAM(s) Oral two times a day  losartan 50 milliGRAM(s) Oral daily  ondansetron    Tablet 4 milliGRAM(s) Oral once      PAST MEDICAL & SURGICAL HISTORY:  SDH (subdural hematoma)  Cervical cancer: Stage I per the daughter s/p hysterectomy april 2000 (complicated by: adhesions / pna)  Endocarditis: 2010 (treated w/ antibiotics in [facility])  Hypertension  Diabetes mellitus  Atrial fibrillation: on aspirin (last taken 1/2/2018)  H/O: hysterectomy  History of tracheostomy: april 2000 as a complication from her hysterectomy - developed pna and was unable to be weaned off the vent    REVIEW OF SYSTEMS:    CONSTITUTIONAL: No weakness, fevers or chills  EYES/ENT: No visual changes;  No vertigo or throat pain   NECK: No pain or stiffness  RESPIRATORY: No cough, wheezing, hemoptysis; +shortness of breath  CARDIOVASCULAR: No chest pain or palpitations  GASTROINTESTINAL: No abdominal or epigastric pain. No nausea, vomiting, or hematemesis; No diarrhea or constipation. No melena or hematochezia.  GENITOURINARY: No dysuria, frequency or hematuria  NEUROLOGICAL: No numbness or weakness  SKIN: No itching, rashes      Vitals:  T(F): 98 (02-22), Max: 98 (02-21)  HR: 46 (02-22) (46 - 56)  BP: 160/50 (02-22) (124/66 - 207/69)  RR: 22 (02-22)  SpO2: 95% (02-22)  I&O's Summary    21 Feb 2018 07:01  -  22 Feb 2018 07:00  --------------------------------------------------------  IN: 600 mL / OUT: 775 mL / NET: -175 mL    22 Feb 2018 07:01  -  22 Feb 2018 11:10  --------------------------------------------------------  IN: 0 mL / OUT: 200 mL / NET: -200 mL        Physical Exam:  Appearance: No acute distress; well appearing  Eyes: PERRL, EOMI, pink conjunctiva  HENT: Normal oral mucosa  Cardiovascular: irregular rhythm, bradycardic unable to appreciate S1, soft S2, harsh iii/vi Systolic murmur audible throughout precordium radiating to carotids/apex; c/w severe AS no rubs, or gallops; +JVD up to mid neck   Respiratory: Diffuse crackles bilaterally; no rhonchi/wheezing   Gastrointestinal: soft, non-tender, non-distended with normal bowel sounds  Musculoskeletal: No clubbing; no joint deformity; +pitting edema bilaterally    Neurologic: Non-focal  Lymphatic: No lymphadenopathy  Psychiatry: AAOx3, mood & affect appropriate  Skin: No rashes, ecchymoses, or cyanosis                          10.2   3.60  )-----------( 139      ( 22 Feb 2018 07:41 )             33.6     02-22    146<H>  |  98  |  20  ----------------------------<  194<H>  3.9   |  33<H>  |  1.04    Ca    9.5      22 Feb 2018 07:38  Phos  2.6     02-22  Mg     2.0     02-22    TPro  6.6  /  Alb  3.0<L>  /  TBili  0.4  /  DBili  x   /  AST  34  /  ALT  35  /  AlkPhos  113  02-21    Echo:  < from: Transthoracic Echocardiogram (02.21.18 @ 14:38) >  1. Calcified trileaflet aortic valve with decreased  opening. Peak transaortic valve gradient equals 55 mm Hg,  mean transaortic valve gradient equals 28 mm Hg, estimated  aortic valve area equals 0.7 sqcm (by continuity equation),  aortic valve velocity time integral equals 82 cm,  consistent with severe aortic stenosis. Minimal aortic  regurgitation.  2. Normal left ventricular systolic function.  3. The right ventricle is not well visualized; grossly  normal right ventricular systolic function.  4. Normal tricuspid valve. Mild-moderate tricuspid  regurgitation.  5. Estimated pulmonary artery systolic pressure equals 54  mm Hg, assuming right atrial pressure equals 15 mm Hg,  consistent with moderate pulmonary pressures.    < end of copied text >      Interpretation of Telemetry: afib, HR 40s-50s     Assessment     89 yo woman w/ hx of A-fib/flutter (not on AC previous, unclear why), mod AS DM, recent SDH s/p left craniotomy w/ evacuation 1/4/2018, cervical cancer s/p hysterectomy various years ago who presented again for recurrent mechanical fall.  In acute on chronic diastolic heart failure.  Likely hypertension (SBP 150s-160s mostly looking back) induced in the setting of not being optimized on her diuretic dose (only on 20 mg daily PO).  Also has valvulopathy (severe AS) which is contributing.  Not responding as well to IV diuresis (~ 700 cc out daily).  Not obtaining accurate weights (+3lbs from the 2/15?).  Spoke w/ nursing staff who will repeat weight this morning.  Also pt hypertensive (SBP up to 200s today).      Plan  - continue IV furosemide 80 BID  - told primary team to give metolazone 5 mg x 1 dose 30 minutes to 1 hour prior to PM IV lasix dose    - cont to monitor I/Os, UOP  - fluid restrict to 1.5L daily   - increase losartan to 100 mg daily  for better afterload reduction  - f/u TTE results.       Vinay Rodriguez MD   [facility] [id number] Patient seen and examined at bedside. Still w/ shortness of breath.  No CP, palpitations or any other concerning sx's.        Medications:  acetaminophen   Tablet. 650 milliGRAM(s) Oral every 6 hours PRN  aluminum hydroxide/magnesium hydroxide/simethicone Suspension 30 milliLiter(s) Oral every 4 hours PRN  aluminum hydroxide/magnesium hydroxide/simethicone Suspension 30 milliLiter(s) Oral every 6 hours PRN  dextrose 5%. 1000 milliLiter(s) IV Continuous <Continuous>  dextrose 50% Injectable 12.5 Gram(s) IV Push once  dextrose 50% Injectable 25 Gram(s) IV Push once  dextrose 50% Injectable 25 Gram(s) IV Push once  dextrose Gel 1 Dose(s) Oral once PRN  furosemide   Injectable 80 milliGRAM(s) IV Push two times a day  glucagon  Injectable 1 milliGRAM(s) IntraMuscular once PRN  insulin glargine Injectable (LANTUS) 8 Unit(s) SubCutaneous at bedtime  insulin lispro (HumaLOG) corrective regimen sliding scale   SubCutaneous three times a day before meals  insulin lispro (HumaLOG) corrective regimen sliding scale   SubCutaneous at bedtime  insulin lispro Injectable (HumaLOG) 5 Unit(s) SubCutaneous three times a day before meals  lacosamide 150 milliGRAM(s) Oral two times a day  losartan 50 milliGRAM(s) Oral daily  ondansetron    Tablet 4 milliGRAM(s) Oral once      PAST MEDICAL & SURGICAL HISTORY:  SDH (subdural hematoma)  Cervical cancer: Stage I per the daughter s/p hysterectomy april 2000 (complicated by: adhesions / pna)  Endocarditis: 2010 (treated w/ antibiotics in [facility])  Hypertension  Diabetes mellitus  Atrial fibrillation: on aspirin (last taken 1/2/2018)  H/O: hysterectomy  History of tracheostomy: april 2000 as a complication from her hysterectomy - developed pna and was unable to be weaned off the vent    REVIEW OF SYSTEMS:  CONSTITUTIONAL: No weakness, fevers or chills  EYES/ENT: No visual changes;  No vertigo or throat pain   NECK: No pain or stiffness  RESPIRATORY: No cough, wheezing, hemoptysis; +shortness of breath  CARDIOVASCULAR: No chest pain or palpitations  GASTROINTESTINAL: No abdominal or epigastric pain. No nausea, vomiting, or hematemesis; No diarrhea or constipation. No melena or hematochezia.  GENITOURINARY: No dysuria, frequency or hematuria  NEUROLOGICAL: No numbness or weakness  SKIN: No itching, rashes      Vitals:  T(F): 98 (02-22), Max: 98 (02-21)  HR: 46 (02-22) (46 - 56)  BP: 160/50 (02-22) (124/66 - 207/69)  RR: 22 (02-22)  SpO2: 95% (02-22)  I&O's Summary    21 Feb 2018 07:01  -  22 Feb 2018 07:00  --------------------------------------------------------  IN: 600 mL / OUT: 775 mL / NET: -175 mL    22 Feb 2018 07:01  -  22 Feb 2018 11:10  --------------------------------------------------------  IN: 0 mL / OUT: 200 mL / NET: -200 mL        Physical Exam:  Appearance: No acute distress; well appearing  Eyes: PERRL, EOMI, pink conjunctiva  HENT: Normal oral mucosa  Cardiovascular: irregular rhythm, bradycardic unable to appreciate S1, soft S2, harsh iii/vi Systolic murmur audible throughout precordium radiating to carotids/apex; c/w severe AS no rubs, or gallops; +JVD up to mid neck   Respiratory: Diffuse crackles bilaterally; no rhonchi/wheezing   Gastrointestinal: soft, non-tender, non-distended with normal bowel sounds  Musculoskeletal: No clubbing; no joint deformity; +pitting edema bilaterally    Neurologic: Non-focal  Lymphatic: No lymphadenopathy  Psychiatry: AAOx3, mood & affect appropriate  Skin: No rashes, ecchymoses, or cyanosis                          10.2   3.60  )-----------( 139      ( 22 Feb 2018 07:41 )             33.6     02-22    146<H>  |  98  |  20  ----------------------------<  194<H>  3.9   |  33<H>  |  1.04    Ca    9.5      22 Feb 2018 07:38  Phos  2.6     02-22  Mg     2.0     02-22    TPro  6.6  /  Alb  3.0<L>  /  TBili  0.4  /  DBili  x   /  AST  34  /  ALT  35  /  AlkPhos  113  02-21    Echo:  < from: Transthoracic Echocardiogram (02.21.18 @ 14:38) >  1. Calcified trileaflet aortic valve with decreased  opening. Peak transaortic valve gradient equals 55 mm Hg,  mean transaortic valve gradient equals 28 mm Hg, estimated  aortic valve area equals 0.7 sqcm (by continuity equation),  aortic valve velocity time integral equals 82 cm,  consistent with severe aortic stenosis. Minimal aortic  regurgitation.  2. Normal left ventricular systolic function.  3. The right ventricle is not well visualized; grossly  normal right ventricular systolic function.  4. Normal tricuspid valve. Mild-moderate tricuspid  regurgitation.  5. Estimated pulmonary artery systolic pressure equals 54  mm Hg, assuming right atrial pressure equals 15 mm Hg,  consistent with moderate pulmonary pressures.    < end of copied text >      Interpretation of Telemetry: afib, HR 40s-50s     Assessment     88 yo woman w/ hx of A-fib/flutter (not on AC previous, unclear why), mod AS DM, recent SDH s/p left craniotomy w/ evacuation 1/4/2018, cervical cancer s/p hysterectomy various years ago who presented again for recurrent mechanical fall.  In acute on chronic diastolic heart failure.  Likely hypertension (SBP 150s-160s mostly looking back) induced in the setting of not being optimized on her diuretic dose (only on 20 mg daily PO).  Also has valvulopathy (severe AS) which is contributing.  Not responding as well to IV diuresis (~ 700 cc out daily).  Not obtaining accurate weights (+3lbs from the 2/15?).  Spoke w/ nursing staff who will repeat weight this morning.  Also pt hypertensive (SBP up to 200s today).      Plan  - continue IV furosemide 80 BID  - told primary team to give metolazone 5 mg x 1 dose 30 minutes to 1 hour prior to PM IV lasix dose    - cont to monitor I/Os, UOP  - fluid restrict to 1.5L daily   - increase losartan to 100 mg daily  for better afterload reduction  - f/u TTE results.       Vinay Rodriguez MD   [facility] [id number]

## 2018-02-22 NOTE — PROGRESS NOTE ADULT - SUBJECTIVE AND OBJECTIVE BOX
Patient is a 89y old  Female who presents with a chief complaint of fall and altered mental status (18 Feb 2018 13:32)      SUBJECTIVE / OVERNIGHT EVENTS:  Overnight, SOB and tachypneic.  RR to 30s. 99% on 2L of oxygen.  BP was 200/84 and downtrended after receiving lasix.  Patient afebrile overnight.   This morning, patient confused, needed reorientation.  Per family, this is not the patient's baseline.    ROS unable to assess 2/2 to mental status and non cooperation.    Tele- afib rate 40-50s overnight    MEDICATIONS  (STANDING):  dextrose 5%. 1000 milliLiter(s) (50 mL/Hr) IV Continuous <Continuous>  dextrose 50% Injectable 12.5 Gram(s) IV Push once  dextrose 50% Injectable 25 Gram(s) IV Push once  dextrose 50% Injectable 25 Gram(s) IV Push once  furosemide   Injectable 80 milliGRAM(s) IV Push two times a day  insulin glargine Injectable (LANTUS) 8 Unit(s) SubCutaneous at bedtime  insulin lispro (HumaLOG) corrective regimen sliding scale   SubCutaneous three times a day before meals  insulin lispro (HumaLOG) corrective regimen sliding scale   SubCutaneous at bedtime  insulin lispro Injectable (HumaLOG) 5 Unit(s) SubCutaneous three times a day before meals  lacosamide 150 milliGRAM(s) Oral two times a day  losartan 50 milliGRAM(s) Oral daily  ondansetron    Tablet 4 milliGRAM(s) Oral once    MEDICATIONS  (PRN):  acetaminophen   Tablet. 650 milliGRAM(s) Oral every 6 hours PRN mild to moderate pain  aluminum hydroxide/magnesium hydroxide/simethicone Suspension 30 milliLiter(s) Oral every 4 hours PRN Dyspepsia  aluminum hydroxide/magnesium hydroxide/simethicone Suspension 30 milliLiter(s) Oral every 6 hours PRN Dyspepsia  dextrose Gel 1 Dose(s) Oral once PRN Blood Glucose LESS THAN 70 milliGRAM(s)/deciliter  glucagon  Injectable 1 milliGRAM(s) IntraMuscular once PRN Glucose LESS THAN 70 milligrams/deciliter        CAPILLARY BLOOD GLUCOSE      POCT Blood Glucose.: 196 mg/dL (22 Feb 2018 07:31)  POCT Blood Glucose.: 182 mg/dL (22 Feb 2018 03:23)  POCT Blood Glucose.: 162 mg/dL (21 Feb 2018 21:37)  POCT Blood Glucose.: 166 mg/dL (21 Feb 2018 16:10)  POCT Blood Glucose.: 206 mg/dL (21 Feb 2018 11:31)    Vital Signs Last 24 Hrs  T(C): 36.7 (22 Feb 2018 04:19), Max: 36.7 (21 Feb 2018 20:25)  T(F): 98 (22 Feb 2018 04:19), Max: 98 (21 Feb 2018 20:25)  HR: 46 (22 Feb 2018 04:43) (46 - 56)  BP: 160/50 (22 Feb 2018 04:43) (124/66 - 207/69)  BP(mean): --  RR: 22 (22 Feb 2018 04:43) (18 - 30)  SpO2: 95% (22 Feb 2018 04:43) (95% - 100%)      PHYSICAL EXAM:  GENERAL: tachypneic, on 2L O2 nasal cannula, confused, not allowing PCA to do fingerstick  HEAD:  Atraumatic, Normocephalic  EYES: EOMI, conjunctiva and sclera clear  NECK: Supple, No JVD  CHEST/LUNG: crackles through lung fields up to mid lung, no wheezing  HEART: Regular rate and rhythm; Grade III systolic murmur loudest at right sternal border but heard throughout  ABDOMEN: Soft, Nontender, Nondistended; Bowel sounds present  EXTREMITIES:  peripheral pulses unable to be palpated, No clubbing, cyanosis, 1+ LE edema  NEUROLOGY: A&Ox2  SKIN: No rashes or lesions      LABS:                                                    10.2   3.60  )-----------( 139      ( 22 Feb 2018 07:41 )             33.6     02-22    146<H>  |  98  |  20  ----------------------------<  194<H>  3.9   |  33<H>  |  1.04    Ca    9.5      22 Feb 2018 07:38  Phos  2.6     02-22  Mg     2.0     02-22    TPro  6.6  /  Alb  3.0<L>  /  TBili  0.4  /  DBili  x   /  AST  34  /  ALT  35  /  AlkPhos  113  02-21      RESPIRATORY  VENT:    ABG:     VBG:     RADIOLOGY & ADDITIONAL TESTS:    Imaging Personally Reviewed:    Consultant(s) Notes Reviewed:      Care Discussed with Consultants/Other Providers:

## 2018-02-22 NOTE — PROGRESS NOTE ADULT - PROBLEM SELECTOR PLAN 3
Resolved, patient has been afebrile and is s/p 7 days of zosyn  - spiked a fever on 2/14 with no found source: UA not convincing for UTI and pt s/p 3 day course of CTX, CXR: no focal consolidation, B/C from 2/14 NTD  - RUQ U/S obtained in setting of transaminitis with no evidence of stones, and no tenderness to suggest cholecystitis, however mild thickening and gallbladder sludge.

## 2018-02-22 NOTE — PROGRESS NOTE ADULT - PROBLEM SELECTOR PLAN 6
- fall at nursing facility with no reported loss of consciousness, as per daughter, has remained alert and oriented as per baseline  - CTH performed x 2, no evidence of worsening of the left subdural collection. No intervention planned by neurosurgery  - q4 neuro checks, fall precautions

## 2018-02-22 NOTE — PROGRESS NOTE ADULT - PROBLEM SELECTOR PLAN 1
Appears clinically like new heart failure.  Crackles throughout lungs up to mid lung fields, no LE edema currently.  Patient wean to 2 L oxygen yesterday but still tachypneic when laying flat.   - family refusing bet[facility]ourt for I/O -- > pt still volume overloaded, on 2 L NC.  - daily weights  - TTE in 1/2018: EF 75%, moderate MS and moderate AS  - TTE 2/21: EF 68%, severe AS  - cardiology consulted  - start losartan 50 PO daily per cards  - C/w lasix 80 IV BID Appears clinically like new heart failure.  Crackles throughout lungs up to mid lung fields, no LE edema currently.  Patient wean to 2 L oxygen yesterday but still tachypneic when laying flat.   - family refusing bet[facility]ourt for I/O -- > pt still volume overloaded, on 2 L NC.  - daily weights  - TTE in 1/2018: EF 75%, moderate MS and moderate AS  - TTE 2/21: EF 68%, severe AS; spoke with cards, patient poor candidate for TAVR because patient has hx of SDH and is not on AC.  - cardiology consulted: increase losartan to 100 daily  - C/w lasix 80 IV BID; give metalazone 5 mg prior to next dose of lasix

## 2018-02-22 NOTE — CHART NOTE - NSCHARTNOTEFT_GEN_A_CORE
Saw pt for tachypnea.  O2 sat 99% on 2L.  Per nurse pt usually shows signs of respiratory distress however now worse.  Pt was tachypneic to approx 30 and grunting with breaths.   BP also elevated to 200/84 which is not significantly elevated from previous BPs however still elevated.  Pt is due to start losartan today.  Anteriorly no wheezes and clear breath sounds.  Per nurse posteriorly bases have crackles.  Advised nurse to give lasix early and monitor vitals.  If pts BP and tachypnea do not improve the nurse will notify me and will consider further intervention. Saw pt for tachypnea.  O2 sat 99% on 2L.  Per nurse pt usually shows signs of respiratory distress however now worse.  Pt was tachypneic to approx 30 and grunting with breaths.   BP also elevated to 200/84 which is not significantly elevated from previous BPs however still elevated.  Pt is due to start losartan today.  Anteriorly no wheezes and clear breath sounds.  Per nurse posteriorly bases have crackles.  Advised nurse to give lasix early and monitor vitals.  If pts BP and tachypnea do not improve the nurse will notify me and will consider further intervention.  VBG ordered.

## 2018-02-23 LAB
ALBUMIN SERPL ELPH-MCNC: 3 G/DL — LOW (ref 3.3–5)
ALP SERPL-CCNC: 111 U/L — SIGNIFICANT CHANGE UP (ref 40–120)
ALT FLD-CCNC: 35 U/L — SIGNIFICANT CHANGE UP (ref 10–45)
ANION GAP SERPL CALC-SCNC: 14 MMOL/L — SIGNIFICANT CHANGE UP (ref 5–17)
AST SERPL-CCNC: 32 U/L — SIGNIFICANT CHANGE UP (ref 10–40)
BILIRUB SERPL-MCNC: 0.6 MG/DL — SIGNIFICANT CHANGE UP (ref 0.2–1.2)
BUN SERPL-MCNC: 22 MG/DL — SIGNIFICANT CHANGE UP (ref 7–23)
CALCIUM SERPL-MCNC: 10.2 MG/DL — SIGNIFICANT CHANGE UP (ref 8.4–10.5)
CHLORIDE SERPL-SCNC: 97 MMOL/L — SIGNIFICANT CHANGE UP (ref 96–108)
CO2 SERPL-SCNC: 34 MMOL/L — HIGH (ref 22–31)
CREAT SERPL-MCNC: 1.08 MG/DL — SIGNIFICANT CHANGE UP (ref 0.5–1.3)
GLUCOSE BLDC GLUCOMTR-MCNC: 175 MG/DL — HIGH (ref 70–99)
GLUCOSE BLDC GLUCOMTR-MCNC: 182 MG/DL — HIGH (ref 70–99)
GLUCOSE BLDC GLUCOMTR-MCNC: 189 MG/DL — HIGH (ref 70–99)
GLUCOSE BLDC GLUCOMTR-MCNC: 194 MG/DL — HIGH (ref 70–99)
GLUCOSE SERPL-MCNC: 168 MG/DL — HIGH (ref 70–99)
HCT VFR BLD CALC: 33.5 % — LOW (ref 34.5–45)
HGB BLD-MCNC: 10.3 G/DL — LOW (ref 11.5–15.5)
MAGNESIUM SERPL-MCNC: 2 MG/DL — SIGNIFICANT CHANGE UP (ref 1.6–2.6)
MCHC RBC-ENTMCNC: 28.9 PG — SIGNIFICANT CHANGE UP (ref 27–34)
MCHC RBC-ENTMCNC: 30.7 GM/DL — LOW (ref 32–36)
MCV RBC AUTO: 93.8 FL — SIGNIFICANT CHANGE UP (ref 80–100)
PHOSPHATE SERPL-MCNC: 2.6 MG/DL — SIGNIFICANT CHANGE UP (ref 2.5–4.5)
PLATELET # BLD AUTO: 126 K/UL — LOW (ref 150–400)
POTASSIUM SERPL-MCNC: 3.4 MMOL/L — LOW (ref 3.5–5.3)
POTASSIUM SERPL-SCNC: 3.4 MMOL/L — LOW (ref 3.5–5.3)
PROT SERPL-MCNC: 6.8 G/DL — SIGNIFICANT CHANGE UP (ref 6–8.3)
RBC # BLD: 3.57 M/UL — LOW (ref 3.8–5.2)
RBC # FLD: 17.5 % — HIGH (ref 10.3–14.5)
SODIUM SERPL-SCNC: 145 MMOL/L — SIGNIFICANT CHANGE UP (ref 135–145)
WBC # BLD: 3.16 K/UL — LOW (ref 3.8–10.5)
WBC # FLD AUTO: 3.16 K/UL — LOW (ref 3.8–10.5)

## 2018-02-23 PROCEDURE — 99233 SBSQ HOSP IP/OBS HIGH 50: CPT | Mod: GC

## 2018-02-23 PROCEDURE — 71045 X-RAY EXAM CHEST 1 VIEW: CPT | Mod: 26

## 2018-02-23 RX ORDER — POTASSIUM CHLORIDE 20 MEQ
40 PACKET (EA) ORAL ONCE
Qty: 0 | Refills: 0 | Status: COMPLETED | OUTPATIENT
Start: 2018-02-23 | End: 2018-02-23

## 2018-02-23 RX ADMIN — Medication 5 UNIT(S): at 07:48

## 2018-02-23 RX ADMIN — Medication 2: at 07:48

## 2018-02-23 RX ADMIN — LOSARTAN POTASSIUM 100 MILLIGRAM(S): 100 TABLET, FILM COATED ORAL at 06:17

## 2018-02-23 RX ADMIN — Medication 80 MILLIGRAM(S): at 18:21

## 2018-02-23 RX ADMIN — Medication 40 MILLIEQUIVALENT(S): at 12:11

## 2018-02-23 RX ADMIN — Medication 2: at 12:11

## 2018-02-23 RX ADMIN — LACOSAMIDE 150 MILLIGRAM(S): 50 TABLET ORAL at 06:18

## 2018-02-23 RX ADMIN — LACOSAMIDE 150 MILLIGRAM(S): 50 TABLET ORAL at 17:05

## 2018-02-23 RX ADMIN — Medication 80 MILLIGRAM(S): at 06:17

## 2018-02-23 RX ADMIN — Medication 2: at 16:48

## 2018-02-23 RX ADMIN — Medication 5 UNIT(S): at 16:48

## 2018-02-23 RX ADMIN — INSULIN GLARGINE 8 UNIT(S): 100 INJECTION, SOLUTION SUBCUTANEOUS at 21:45

## 2018-02-23 RX ADMIN — Medication 5 UNIT(S): at 12:12

## 2018-02-23 NOTE — PROGRESS NOTE ADULT - ATTENDING COMMENTS
Continuing with IV diuresis, eventual transition to Po regimen.   Patient and family again declined betancourt placement.   May benefit from palliative eval, as overall poor prognosis with continued medical management for severe AS.

## 2018-02-23 NOTE — PROGRESS NOTE ADULT - ATTENDING COMMENTS
89 year old woman from nursing home for fall, altered mental status, recent evacuation of subdural hematoma with chronic slow atrial fibrillation off anticoagulation at least since SDH. Manifests signs of congestive heart failure. On exam stable blood pressure, mildly slow heart rate, prominent neck veins, rales at bases. Cardiac exam as above consistent with significant AS, no findings to suggest significant mitral stenosis.      Responding to diuresis for pulmonary vascular congestion, pulmonary hypertension. Doubt observation of mitral gradient on echo of clinical significance. May have low gradient severe AS, but based on overall clinical status this will need to be managed medically.

## 2018-02-23 NOTE — PROGRESS NOTE ADULT - PROBLEM SELECTOR PLAN 1
Appears clinically like new heart failure.  Crackles throughout lungs up to mid lung fields, no LE edema currently.  Patient wean to 2 L oxygen yesterday but still tachypneic when laying flat.   - family refusing betancourt for I/O -- > pt still volume overloaded, on 2 L NC.  - daily weights  - TTE in 1/2018: EF 75%, moderate MS and moderate AS  - TTE 2/21: EF 68%, severe AS; spoke with cards, patient poor candidate for TAVR because patient has hx of SDH and is not on AC.  - s/p metolazone and /hr  - c/w losartan to 100 daily  - C/w lasix 80 IV BID

## 2018-02-23 NOTE — PROGRESS NOTE ADULT - SUBJECTIVE AND OBJECTIVE BOX
Patient seen and examined at bedside.  Still w/ dyspnea requiring NC (2-3L).  No other new/acute complaints.          Medications:  acetaminophen   Tablet. 650 milliGRAM(s) Oral every 6 hours PRN  aluminum hydroxide/magnesium hydroxide/simethicone Suspension 30 milliLiter(s) Oral every 4 hours PRN  aluminum hydroxide/magnesium hydroxide/simethicone Suspension 30 milliLiter(s) Oral every 6 hours PRN  dextrose 5%. 1000 milliLiter(s) IV Continuous <Continuous>  dextrose 50% Injectable 12.5 Gram(s) IV Push once  dextrose 50% Injectable 25 Gram(s) IV Push once  dextrose 50% Injectable 25 Gram(s) IV Push once  dextrose Gel 1 Dose(s) Oral once PRN  furosemide   Injectable 80 milliGRAM(s) IV Push two times a day  glucagon  Injectable 1 milliGRAM(s) IntraMuscular once PRN  insulin glargine Injectable (LANTUS) 8 Unit(s) SubCutaneous at bedtime  insulin lispro (HumaLOG) corrective regimen sliding scale   SubCutaneous three times a day before meals  insulin lispro (HumaLOG) corrective regimen sliding scale   SubCutaneous at bedtime  insulin lispro Injectable (HumaLOG) 5 Unit(s) SubCutaneous three times a day before meals  lacosamide 150 milliGRAM(s) Oral two times a day  losartan 100 milliGRAM(s) Oral daily  ondansetron    Tablet 4 milliGRAM(s) Oral once  potassium chloride   Solution 40 milliEquivalent(s) Oral once      PAST MEDICAL & SURGICAL HISTORY:  SDH (subdural hematoma)  Cervical cancer: Stage I per the daughter s/p hysterectomy april 2000 (complicated by: adhesions / pna)  Endocarditis: 2010 (treated w/ antibiotics in [facility])  Hypertension  Diabetes mellitus  Atrial fibrillation: on aspirin (last taken 1/2/2018)  H/O: hysterectomy  History of tracheostomy: april 2000 as a complication from her hysterectomy - developed pna and was unable to be weaned off the vent    REVIEW OF SYSTEMS:    CONSTITUTIONAL: No weakness, fevers or chills  EYES/ENT: No visual changes;  No vertigo or throat pain   NECK: No pain or stiffness  RESPIRATORY: No cough, wheezing, hemoptysis; +shortness of breath  CARDIOVASCULAR: No chest pain or palpitations  GASTROINTESTINAL: No abdominal or epigastric pain. No nausea, vomiting, or hematemesis; No diarrhea or constipation. No melena or hematochezia.  GENITOURINARY: No dysuria, frequency or hematuria  NEUROLOGICAL: No numbness or weakness  SKIN: No itching, rashes        Vitals:  T(F): 98.4 (02-23), Max: 98.4 (02-23)  HR: 59 (02-23) (46 - 59)  BP: 126/83 (02-23) (126/83 - 188/69)  RR: 18 (02-23)  SpO2: 100% (02-23)  I&O's Summary    22 Feb 2018 07:01  -  23 Feb 2018 07:00  --------------------------------------------------------  IN: 700 mL / OUT: 1550 mL / NET: -850 mL        Physical Exam:    Appearance: No acute distress; well appearing  Eyes: PERRL, EOMI, pink conjunctiva  HENT: Normal oral mucosa  Cardiovascular: irregular rhythm, bradycardic unable to appreciate S1, soft S2, harsh iii/vi Systolic murmur audible throughout precordium radiating to carotids/apex; c/w severe AS no rubs, or gallops; +JVD ~ 6 cm; improved    Respiratory: Diffuse crackles bilaterally; no rhonchi/wheezing   Gastrointestinal: soft, non-tender, non-distended with normal bowel sounds  Musculoskeletal: No clubbing; no joint deformity; +pitting edema bilaterally; however improved today     Neurologic: Non-focal  Lymphatic: No lymphadenopathy  Psychiatry: AAOx3, mood & affect appropriate  Skin: No rashes, ecchymoses, or cyanosis                          10.3   3.16  )-----------( 126      ( 23 Feb 2018 07:26 )             33.5     02-23    145  |  97  |  22  ----------------------------<  168<H>  3.4<L>   |  34<H>  |  1.08    Ca    10.2      23 Feb 2018 07:42  Phos  2.6     02-23  Mg     2.0     02-23    TPro  6.8  /  Alb  3.0<L>  /  TBili  0.6  /  DBili  x   /  AST  32  /  ALT  35  /  AlkPhos  111  02-23        Interpretation of Telemetry: a-fib 40s-60s     Assessment     89 yo woman w/ hx of A-fib/flutter (not on AC previous, unclear why), mod AS DM, recent SDH s/p left craniotomy w/ evacuation 1/4/2018, cervical cancer s/p hysterectomy various years ago who presented again for recurrent mechanical fall.  In acute on chronic diastolic heart failure.  Likely hypertension (SBP 150s-160s mostly looking back) induced in the setting of not being optimized on her diuretic dose (only on 20 mg daily PO).  Also has valvulopathy (severe AS) which is contributing.  Better response to diuresis today (-800 cc; UOP 1.5L).  pt hypertensive (SBP 170s- 200s today), however received higher dose of losartan PO (100 mg) w/ BP improvement (120s/70s).  Still not optimized from a volume status.      Plan  - continue IV furosemide 80 BID  - give another dose of PO metolazone 5 mg today 30 minutes to 1 hour prior to PM IV lasix dose    - replace K to maintain >4, keep mg>2   - cont to monitor I/Os, UOP  - fluid restrict to 1.5L daily   - cont losartan 100 mg daily for better afterload reduction      Vinay Rodriguez MD   [facility] [id number] Patient seen and examined at bedside.  Still w/ dyspnea requiring NC (2-3L).  No other new/acute complaints.          Medications:  acetaminophen   Tablet. 650 milliGRAM(s) Oral every 6 hours PRN  aluminum hydroxide/magnesium hydroxide/simethicone Suspension 30 milliLiter(s) Oral every 4 hours PRN  aluminum hydroxide/magnesium hydroxide/simethicone Suspension 30 milliLiter(s) Oral every 6 hours PRN  dextrose 5%. 1000 milliLiter(s) IV Continuous <Continuous>  dextrose 50% Injectable 12.5 Gram(s) IV Push once  dextrose 50% Injectable 25 Gram(s) IV Push once  dextrose 50% Injectable 25 Gram(s) IV Push once  dextrose Gel 1 Dose(s) Oral once PRN  furosemide   Injectable 80 milliGRAM(s) IV Push two times a day  glucagon  Injectable 1 milliGRAM(s) IntraMuscular once PRN  insulin glargine Injectable (LANTUS) 8 Unit(s) SubCutaneous at bedtime  insulin lispro (HumaLOG) corrective regimen sliding scale   SubCutaneous three times a day before meals  insulin lispro (HumaLOG) corrective regimen sliding scale   SubCutaneous at bedtime  insulin lispro Injectable (HumaLOG) 5 Unit(s) SubCutaneous three times a day before meals  lacosamide 150 milliGRAM(s) Oral two times a day  losartan 100 milliGRAM(s) Oral daily  ondansetron    Tablet 4 milliGRAM(s) Oral once  potassium chloride   Solution 40 milliEquivalent(s) Oral once      PAST MEDICAL & SURGICAL HISTORY:  SDH (subdural hematoma)  Cervical cancer: Stage I per the daughter s/p hysterectomy april 2000 (complicated by: adhesions / pna)  Endocarditis: 2010 (treated w/ antibiotics in [facility])  Hypertension  Diabetes mellitus  Atrial fibrillation: on aspirin (last taken 1/2/2018)  H/O: hysterectomy  History of tracheostomy: april 2000 as a complication from her hysterectomy - developed pna and was unable to be weaned off the vent    REVIEW OF SYSTEMS:  CONSTITUTIONAL: No weakness, fevers or chills  EYES/ENT: No visual changes;  No vertigo or throat pain   NECK: No pain or stiffness  RESPIRATORY: No cough, wheezing, hemoptysis; +shortness of breath  CARDIOVASCULAR: No chest pain or palpitations  GASTROINTESTINAL: No abdominal or epigastric pain. No nausea, vomiting, or hematemesis; No diarrhea or constipation. No melena or hematochezia.  GENITOURINARY: No dysuria, frequency or hematuria  NEUROLOGICAL: No numbness or weakness  SKIN: No itching, rashes        Vitals:  T(F): 98.4 (02-23), Max: 98.4 (02-23)  HR: 59 (02-23) (46 - 59)  BP: 126/83 (02-23) (126/83 - 188/69)  RR: 18 (02-23)  SpO2: 100% (02-23)  I&O's Summary    22 Feb 2018 07:01  -  23 Feb 2018 07:00  --------------------------------------------------------  IN: 700 mL / OUT: 1550 mL / NET: -850 mL        Physical Exam:    Appearance: No acute distress; well appearing  Eyes: PERRL, EOMI, pink conjunctiva  HENT: Normal oral mucosa  Cardiovascular: irregular rhythm, bradycardic unable to appreciate S1, soft S2, harsh iii/vi Systolic murmur audible throughout precordium radiating to carotids/apex; c/w severe AS no rubs, or gallops; +JVD ~ 6 cm; improved    Respiratory: Diffuse crackles bilaterally; no rhonchi/wheezing   Gastrointestinal: soft, non-tender, non-distended with normal bowel sounds  Musculoskeletal: No clubbing; no joint deformity; +pitting edema bilaterally; however improved today     Neurologic: Non-focal  Lymphatic: No lymphadenopathy  Psychiatry: AAOx3, mood & affect appropriate  Skin: No rashes, ecchymoses, or cyanosis                          10.3   3.16  )-----------( 126      ( 23 Feb 2018 07:26 )             33.5     02-23    145  |  97  |  22  ----------------------------<  168<H>  3.4<L>   |  34<H>  |  1.08    Ca    10.2      23 Feb 2018 07:42  Phos  2.6     02-23  Mg     2.0     02-23    TPro  6.8  /  Alb  3.0<L>  /  TBili  0.6  /  DBili  x   /  AST  32  /  ALT  35  /  AlkPhos  111  02-23        Interpretation of Telemetry: a-fib 40s-60s     Assessment     88 yo woman w/ hx of A-fib/flutter (not on AC previous, unclear why), mod AS DM, recent SDH s/p left craniotomy w/ evacuation 1/4/2018, cervical cancer s/p hysterectomy various years ago who presented again for recurrent mechanical fall.  In acute on chronic diastolic heart failure.  Likely hypertension (SBP 150s-160s mostly looking back) induced in the setting of not being optimized on her diuretic dose (only on 20 mg daily PO).  Also has valvulopathy (severe AS) which is contributing.  Better response to diuresis today (-800 cc; UOP 1.5L).  pt hypertensive (SBP 170s- 200s today), however received higher dose of losartan PO (100 mg) w/ BP improvement (120s/70s).  Still not optimized from a volume status.      Plan  - continue IV furosemide 80 BID  - give another dose of PO metolazone 5 mg today 30 minutes to 1 hour prior to PM IV lasix dose    - replace K to maintain >4, keep mg>2   - cont to monitor I/Os, UOP  - fluid restrict to 1.5L daily   - cont losartan 100 mg daily for better afterload reduction      Vinay Rodriguez MD   [facility] [id number]

## 2018-02-23 NOTE — PROGRESS NOTE ADULT - PROBLEM SELECTOR PLAN 9
- As per conversation with both daughters --- patient is FULL CODE  - DVT PPx: SCDs     Dispo:  pending volume optimization and oxygen requirements. Per PT dispo to rehab, but pt prefers home with home PT.     Nuzhat Avalos, PGY-1   [phone]

## 2018-02-23 NOTE — PROGRESS NOTE ADULT - SUBJECTIVE AND OBJECTIVE BOX
Patient is a 89y old  Female who presents with a chief complaint of fall and altered mental status (18 Feb 2018 13:32)      SUBJECTIVE / OVERNIGHT EVENTS:  Overnight, hypertensive to SBP 200s, downtrended to 170s.  Patient denies headaches, dizziness, lightheadedness.    Continues to have SOB when laying down.    Tele- afib rate 40-50s overnight    MEDICATIONS  (STANDING):  dextrose 5%. 1000 milliLiter(s) (50 mL/Hr) IV Continuous <Continuous>  dextrose 50% Injectable 12.5 Gram(s) IV Push once  dextrose 50% Injectable 25 Gram(s) IV Push once  dextrose 50% Injectable 25 Gram(s) IV Push once  furosemide   Injectable 80 milliGRAM(s) IV Push two times a day  insulin glargine Injectable (LANTUS) 8 Unit(s) SubCutaneous at bedtime  insulin lispro (HumaLOG) corrective regimen sliding scale   SubCutaneous three times a day before meals  insulin lispro (HumaLOG) corrective regimen sliding scale   SubCutaneous at bedtime  insulin lispro Injectable (HumaLOG) 5 Unit(s) SubCutaneous three times a day before meals  lacosamide 150 milliGRAM(s) Oral two times a day  losartan 100 milliGRAM(s) Oral daily  ondansetron    Tablet 4 milliGRAM(s) Oral once    MEDICATIONS  (PRN):  acetaminophen   Tablet. 650 milliGRAM(s) Oral every 6 hours PRN mild to moderate pain  aluminum hydroxide/magnesium hydroxide/simethicone Suspension 30 milliLiter(s) Oral every 4 hours PRN Dyspepsia  aluminum hydroxide/magnesium hydroxide/simethicone Suspension 30 milliLiter(s) Oral every 6 hours PRN Dyspepsia  dextrose Gel 1 Dose(s) Oral once PRN Blood Glucose LESS THAN 70 milliGRAM(s)/deciliter  glucagon  Injectable 1 milliGRAM(s) IntraMuscular once PRN Glucose LESS THAN 70 milligrams/deciliter      CAPILLARY BLOOD GLUCOSE      POCT Blood Glucose.: 189 mg/dL (23 Feb 2018 07:30)    Vital Signs Last 24 Hrs  T(C): 36.9 (23 Feb 2018 04:29), Max: 36.9 (23 Feb 2018 04:29)  T(F): 98.4 (23 Feb 2018 04:29), Max: 98.4 (23 Feb 2018 04:29)  HR: 59 (23 Feb 2018 06:10) (46 - 59)  BP: 174/62 (23 Feb 2018 06:48) (147/62 - 188/69)  BP(mean): --  RR: 18 (23 Feb 2018 04:29) (18 - 20)  SpO2: 100% (23 Feb 2018 04:29) (96% - 100%)      PHYSICAL EXAM:  GENERAL: tachypneic, on 2L O2 nasal cannula, confused, not allowing PCA to do fingerstick  HEAD:  Atraumatic, Normocephalic  EYES: EOMI, conjunctiva and sclera clear  NECK: Supple, No JVD  CHEST/LUNG: crackles through lung fields up to mid lung, no wheezing; no breath sounds on the left  HEART: Regular rate and rhythm; Grade III systolic murmur loudest at right sternal border but heard throughout  ABDOMEN: Soft, Nontender, Nondistended; Bowel sounds present  EXTREMITIES:  peripheral pulses unable to be palpated, No clubbing, cyanosis, 1+ LE edema  NEUROLOGY: A&Ox2  SKIN: No rashes or lesions      LABS:                   AM labs pending                                   10.2   3.60  )-----------( 139      ( 22 Feb 2018 07:41 )             33.6     02-22    146<H>  |  98  |  20  ----------------------------<  194<H>  3.9   |  33<H>  |  1.04    Ca    9.5      22 Feb 2018 07:38  Phos  2.6     02-22  Mg     2.0     02-22    TPro  6.6  /  Alb  3.0<L>  /  TBili  0.4  /  DBili  x   /  AST  34  /  ALT  35  /  AlkPhos  113  02-21    RESPIRATORY  VENT:    ABG:     VBG:     RADIOLOGY & ADDITIONAL TESTS:    Imaging Personally Reviewed:    Consultant(s) Notes Reviewed:      Care Discussed with Consultants/Other Providers:

## 2018-02-24 LAB
ANION GAP SERPL CALC-SCNC: 17 MMOL/L — SIGNIFICANT CHANGE UP (ref 5–17)
BUN SERPL-MCNC: 26 MG/DL — HIGH (ref 7–23)
CALCIUM SERPL-MCNC: 10.5 MG/DL — SIGNIFICANT CHANGE UP (ref 8.4–10.5)
CHLORIDE SERPL-SCNC: 93 MMOL/L — LOW (ref 96–108)
CO2 SERPL-SCNC: 32 MMOL/L — HIGH (ref 22–31)
CREAT SERPL-MCNC: 1.12 MG/DL — SIGNIFICANT CHANGE UP (ref 0.5–1.3)
GLUCOSE BLDC GLUCOMTR-MCNC: 124 MG/DL — HIGH (ref 70–99)
GLUCOSE BLDC GLUCOMTR-MCNC: 153 MG/DL — HIGH (ref 70–99)
GLUCOSE BLDC GLUCOMTR-MCNC: 177 MG/DL — HIGH (ref 70–99)
GLUCOSE BLDC GLUCOMTR-MCNC: 269 MG/DL — HIGH (ref 70–99)
GLUCOSE SERPL-MCNC: 171 MG/DL — HIGH (ref 70–99)
HCT VFR BLD CALC: 34 % — LOW (ref 34.5–45)
HGB BLD-MCNC: 10.7 G/DL — LOW (ref 11.5–15.5)
MAGNESIUM SERPL-MCNC: 2.1 MG/DL — SIGNIFICANT CHANGE UP (ref 1.6–2.6)
MCHC RBC-ENTMCNC: 29.4 PG — SIGNIFICANT CHANGE UP (ref 27–34)
MCHC RBC-ENTMCNC: 31.5 GM/DL — LOW (ref 32–36)
MCV RBC AUTO: 93.4 FL — SIGNIFICANT CHANGE UP (ref 80–100)
PHOSPHATE SERPL-MCNC: 3.2 MG/DL — SIGNIFICANT CHANGE UP (ref 2.5–4.5)
PLATELET # BLD AUTO: 136 K/UL — LOW (ref 150–400)
POTASSIUM SERPL-MCNC: 3.5 MMOL/L — SIGNIFICANT CHANGE UP (ref 3.5–5.3)
POTASSIUM SERPL-SCNC: 3.5 MMOL/L — SIGNIFICANT CHANGE UP (ref 3.5–5.3)
RBC # BLD: 3.64 M/UL — LOW (ref 3.8–5.2)
RBC # FLD: 17.7 % — HIGH (ref 10.3–14.5)
SODIUM SERPL-SCNC: 142 MMOL/L — SIGNIFICANT CHANGE UP (ref 135–145)
WBC # BLD: 3.52 K/UL — LOW (ref 3.8–10.5)
WBC # FLD AUTO: 3.52 K/UL — LOW (ref 3.8–10.5)

## 2018-02-24 PROCEDURE — 99233 SBSQ HOSP IP/OBS HIGH 50: CPT | Mod: GC

## 2018-02-24 RX ADMIN — Medication 6: at 12:20

## 2018-02-24 RX ADMIN — Medication 5 UNIT(S): at 17:10

## 2018-02-24 RX ADMIN — Medication 80 MILLIGRAM(S): at 05:11

## 2018-02-24 RX ADMIN — Medication 2: at 08:07

## 2018-02-24 RX ADMIN — LOSARTAN POTASSIUM 100 MILLIGRAM(S): 100 TABLET, FILM COATED ORAL at 05:12

## 2018-02-24 RX ADMIN — Medication 5 UNIT(S): at 12:20

## 2018-02-24 RX ADMIN — Medication 80 MILLIGRAM(S): at 17:10

## 2018-02-24 RX ADMIN — INSULIN GLARGINE 8 UNIT(S): 100 INJECTION, SOLUTION SUBCUTANEOUS at 21:37

## 2018-02-24 RX ADMIN — Medication 5 UNIT(S): at 08:07

## 2018-02-24 NOTE — PROGRESS NOTE ADULT - PROBLEM SELECTOR PLAN 2
Resolved  - etiology initially thought to be 2/2 congestive hepatopathy   - RUQ U/S not revealing of stones or liver pathology and exam unremarkable. However, sludge and mild gallbladder wall thickening.    - will continue to monitor, transaminases downtrending

## 2018-02-24 NOTE — PROGRESS NOTE ADULT - SUBJECTIVE AND OBJECTIVE BOX
Patient is a 89y old  Female who presents with a chief complaint of fall and altered mental status (18 Feb 2018 13:32)      SUBJECTIVE / OVERNIGHT EVENTS:  Overnight, hypertensive overnight, asymptomatic.  Resolved this AM. Continues to have SOB when layign down    Tele- afib rate 40-50s overnight    MEDICATIONS  (STANDING):  dextrose 5%. 1000 milliLiter(s) (50 mL/Hr) IV Continuous <Continuous>  dextrose 50% Injectable 12.5 Gram(s) IV Push once  dextrose 50% Injectable 25 Gram(s) IV Push once  dextrose 50% Injectable 25 Gram(s) IV Push once  furosemide   Injectable 80 milliGRAM(s) IV Push two times a day  insulin glargine Injectable (LANTUS) 8 Unit(s) SubCutaneous at bedtime  insulin lispro (HumaLOG) corrective regimen sliding scale   SubCutaneous three times a day before meals  insulin lispro (HumaLOG) corrective regimen sliding scale   SubCutaneous at bedtime  insulin lispro Injectable (HumaLOG) 5 Unit(s) SubCutaneous three times a day before meals  losartan 100 milliGRAM(s) Oral daily    MEDICATIONS  (PRN):  acetaminophen   Tablet. 650 milliGRAM(s) Oral every 6 hours PRN mild to moderate pain  aluminum hydroxide/magnesium hydroxide/simethicone Suspension 30 milliLiter(s) Oral every 4 hours PRN Dyspepsia  aluminum hydroxide/magnesium hydroxide/simethicone Suspension 30 milliLiter(s) Oral every 6 hours PRN Dyspepsia  dextrose Gel 1 Dose(s) Oral once PRN Blood Glucose LESS THAN 70 milliGRAM(s)/deciliter  glucagon  Injectable 1 milliGRAM(s) IntraMuscular once PRN Glucose LESS THAN 70 milligrams/deciliter      CAPILLARY BLOOD GLUCOSE    CAPILLARY BLOOD GLUCOSE      POCT Blood Glucose.: 177 mg/dL (24 Feb 2018 07:30)      Vital Signs Last 24 Hrs  T(C): 36.8 (24 Feb 2018 04:31), Max: 36.8 (23 Feb 2018 12:00)  T(F): 98.3 (24 Feb 2018 04:31), Max: 98.3 (23 Feb 2018 12:00)  HR: 48 (24 Feb 2018 05:09) (47 - 51)  BP: 143/58 (24 Feb 2018 05:09) (143/58 - 185/82)  BP(mean): --  RR: 18 (24 Feb 2018 04:31) (18 - 18)  SpO2: 100% (24 Feb 2018 04:31) (100% - 100%)    PHYSICAL EXAM:  GENERAL: tachypneic, on 4L O2 nasal cannula, lethargic  HEAD:  Atraumatic, Normocephalic  EYES: EOMI, conjunctiva and sclera clear  NECK: Supple, No JVD  CHEST/LUNG: crackles through lung fields up to mid lung, no wheezing  HEART: Regular rate and rhythm; Grade III systolic murmur loudest at right sternal border but heard throughout  ABDOMEN: Soft, Nontender, Nondistended; Bowel sounds present  EXTREMITIES:  peripheral pulses unable to be palpated, No clubbing, cyanosis, 1+ LE edema  NEUROLOGY: A&Ox2  SKIN: No rashes or lesions      LABS:                        10.7   3.52  )-----------( 136      ( 24 Feb 2018 06:52 )             34.0       02-23    145  |  97  |  22  ----------------------------<  168<H>  3.4<L>   |  34<H>  |  1.08    Ca    10.2      23 Feb 2018 07:42  Phos  2.6     02-23  Mg     2.0     02-23    TPro  6.8  /  Alb  3.0<L>  /  TBili  0.6  /  DBili  x   /  AST  32  /  ALT  35  /  AlkPhos  111  02-23           RESPIRATORY  VENT:    ABG:     VBG:     RADIOLOGY & ADDITIONAL TESTS:    Imaging Personally Reviewed:    Consultant(s) Notes Reviewed:      Care Discussed with Consultants/Other Providers:

## 2018-02-24 NOTE — PROGRESS NOTE ADULT - PROBLEM SELECTOR PLAN 9
- As per conversation with both daughters --- patient is FULL CODE  - DVT PPx: SCDs     Dispo:  pending volume optimization and oxygen requirements. Per PT dispo to rehab, but pt prefers home with home PT.     Nuzhat Avalos, PGY-1   [phone]

## 2018-02-24 NOTE — PROGRESS NOTE ADULT - PROBLEM SELECTOR PLAN 1
Appears clinically like new heart failure.  Crackles throughout lungs up to mid lung fields, no LE edema currently.  Patient wean to 2 L oxygen yesterday but still tachypneic when laying flat.   - family refusing betancourt for I/O -- > pt still volume overloaded, on 4 L NC.  - daily weights  - TTE in 1/2018: EF 75%, moderate MS and moderate AS  - TTE 2/21: EF 68%, severe AS; spoke with cards, patient poor candidate for TAVR because patient has hx of SDH and is not on AC.  - s/p metolazone and /hr  - c/w losartan to 100 daily  - C/w lasix 80 IV BID

## 2018-02-24 NOTE — PROGRESS NOTE ADULT - ATTENDING COMMENTS
Continuing with IV diuresis ( Lasix 80 mg IVP bid), eventual transition to PO regimen.   May benefit from palliative eval, as overall poor prognosis with continued medical management for severe AS.    Family is declining Palliative at this time.

## 2018-02-25 DIAGNOSIS — I50.9 HEART FAILURE, UNSPECIFIED: ICD-10-CM

## 2018-02-25 LAB
ANION GAP SERPL CALC-SCNC: 13 MMOL/L — SIGNIFICANT CHANGE UP (ref 5–17)
BUN SERPL-MCNC: 29 MG/DL — HIGH (ref 7–23)
CALCIUM SERPL-MCNC: 10.1 MG/DL — SIGNIFICANT CHANGE UP (ref 8.4–10.5)
CHLORIDE SERPL-SCNC: 93 MMOL/L — LOW (ref 96–108)
CO2 SERPL-SCNC: 33 MMOL/L — HIGH (ref 22–31)
CREAT SERPL-MCNC: 1.18 MG/DL — SIGNIFICANT CHANGE UP (ref 0.5–1.3)
GLUCOSE BLDC GLUCOMTR-MCNC: 100 MG/DL — HIGH (ref 70–99)
GLUCOSE BLDC GLUCOMTR-MCNC: 131 MG/DL — HIGH (ref 70–99)
GLUCOSE BLDC GLUCOMTR-MCNC: 170 MG/DL — HIGH (ref 70–99)
GLUCOSE BLDC GLUCOMTR-MCNC: 212 MG/DL — HIGH (ref 70–99)
GLUCOSE SERPL-MCNC: 157 MG/DL — HIGH (ref 70–99)
HCT VFR BLD CALC: 31.3 % — LOW (ref 34.5–45)
HGB BLD-MCNC: 9.8 G/DL — LOW (ref 11.5–15.5)
MAGNESIUM SERPL-MCNC: 2 MG/DL — SIGNIFICANT CHANGE UP (ref 1.6–2.6)
MCHC RBC-ENTMCNC: 29.5 PG — SIGNIFICANT CHANGE UP (ref 27–34)
MCHC RBC-ENTMCNC: 31.3 GM/DL — LOW (ref 32–36)
MCV RBC AUTO: 94.3 FL — SIGNIFICANT CHANGE UP (ref 80–100)
PHOSPHATE SERPL-MCNC: 3.3 MG/DL — SIGNIFICANT CHANGE UP (ref 2.5–4.5)
PLATELET # BLD AUTO: 126 K/UL — LOW (ref 150–400)
POTASSIUM SERPL-MCNC: 3.2 MMOL/L — LOW (ref 3.5–5.3)
POTASSIUM SERPL-SCNC: 3.2 MMOL/L — LOW (ref 3.5–5.3)
RBC # BLD: 3.32 M/UL — LOW (ref 3.8–5.2)
RBC # FLD: 17.2 % — HIGH (ref 10.3–14.5)
SODIUM SERPL-SCNC: 139 MMOL/L — SIGNIFICANT CHANGE UP (ref 135–145)
WBC # BLD: 3.8 K/UL — SIGNIFICANT CHANGE UP (ref 3.8–10.5)
WBC # FLD AUTO: 3.8 K/UL — SIGNIFICANT CHANGE UP (ref 3.8–10.5)

## 2018-02-25 PROCEDURE — 99233 SBSQ HOSP IP/OBS HIGH 50: CPT | Mod: GC

## 2018-02-25 RX ORDER — POTASSIUM CHLORIDE 20 MEQ
40 PACKET (EA) ORAL ONCE
Qty: 0 | Refills: 0 | Status: DISCONTINUED | OUTPATIENT
Start: 2018-02-25 | End: 2018-02-25

## 2018-02-25 RX ORDER — POTASSIUM CHLORIDE 20 MEQ
40 PACKET (EA) ORAL EVERY 4 HOURS
Qty: 0 | Refills: 0 | Status: COMPLETED | OUTPATIENT
Start: 2018-02-25 | End: 2018-02-25

## 2018-02-25 RX ADMIN — Medication 40 MILLIEQUIVALENT(S): at 16:51

## 2018-02-25 RX ADMIN — INSULIN GLARGINE 8 UNIT(S): 100 INJECTION, SOLUTION SUBCUTANEOUS at 21:53

## 2018-02-25 RX ADMIN — Medication 2: at 08:06

## 2018-02-25 RX ADMIN — Medication 80 MILLIGRAM(S): at 16:52

## 2018-02-25 RX ADMIN — Medication 80 MILLIGRAM(S): at 05:31

## 2018-02-25 RX ADMIN — Medication 5 UNIT(S): at 08:06

## 2018-02-25 RX ADMIN — Medication 5 UNIT(S): at 16:52

## 2018-02-25 RX ADMIN — LOSARTAN POTASSIUM 100 MILLIGRAM(S): 100 TABLET, FILM COATED ORAL at 05:32

## 2018-02-25 RX ADMIN — Medication 40 MILLIEQUIVALENT(S): at 19:33

## 2018-02-25 RX ADMIN — Medication 4: at 12:22

## 2018-02-25 RX ADMIN — Medication 5 UNIT(S): at 12:22

## 2018-02-25 NOTE — PROGRESS NOTE ADULT - PROBLEM SELECTOR PLAN 1
Admitted with respiratory failure   Crackles throughout lungs up to mid lung fields and unable to lie flat on admission    - daily weights  - No betancourt per family, attempt to maintain strict I/O despite no betancourt  - TTE in 1/2018: EF 75%, moderate MS and moderate AS  - TTE 2/21: EF 68%, severe AS; patient poor candidate for TAVR because patient has hx of SDH and is not on AC.  - s/p metolazone   - c/w losartan to 100 daily  - C/w lasix 80 IV BID  - CTM BMP, stable   - Volume overload significantly improved, now euvolemic   - Consider transitioning to po furosemide

## 2018-02-25 NOTE — PROGRESS NOTE ADULT - SUBJECTIVE AND OBJECTIVE BOX
Patient is a 89y old  Female who presents with a chief complaint of fall and altered mental status (18 Feb 2018 13:32)      INTERVAL HPI/OVERNIGHT EVENTS:  T(C): 36.3 (02-25-18 @ 11:35), Max: 37.2 (02-25-18 @ 04:15)  HR: 53 (02-25-18 @ 11:35) (44 - 53)  BP: 127/65 (02-25-18 @ 11:35) (127/65 - 167/60)  RR: 18 (02-25-18 @ 11:35) (18 - 19)  SpO2: 100% (02-25-18 @ 11:35) (99% - 100%)  Wt(kg): --  I&O's Summary    24 Feb 2018 07:01  -  25 Feb 2018 07:00  --------------------------------------------------------  IN: 680 mL / OUT: 600 mL / NET: 80 mL    25 Feb 2018 07:01  -  25 Feb 2018 13:24  --------------------------------------------------------  IN: 520 mL / OUT: 0 mL / NET: 520 mL        LABS:                        9.8    3.80  )-----------( 126      ( 25 Feb 2018 08:38 )             31.3     02-25    139  |  93<L>  |  29<H>  ----------------------------<  157<H>  3.2<L>   |  33<H>  |  1.18    Ca    10.1      25 Feb 2018 08:19  Phos  3.3     02-25  Mg     2.0     02-25          CAPILLARY BLOOD GLUCOSE      POCT Blood Glucose.: 212 mg/dL (25 Feb 2018 11:33)  POCT Blood Glucose.: 170 mg/dL (25 Feb 2018 07:37)  POCT Blood Glucose.: 153 mg/dL (24 Feb 2018 21:11)  POCT Blood Glucose.: 124 mg/dL (24 Feb 2018 16:36)          REVIEW OF SYSTEMS:  CONSTITUTIONAL: No fever, weight loss, or fatigue  EYES: No eye pain, visual disturbances, or discharge  ENMT:  No difficulty hearing, tinnitus, vertigo; No sinus or throat pain  NECK: No pain or stiffness  BREASTS: No pain, masses, or nipple discharge  RESPIRATORY: No cough, wheezing, chills or hemoptysis; No shortness of breath  CARDIOVASCULAR: No chest pain, palpitations, dizziness, or leg swelling  GASTROINTESTINAL: No abdominal or epigastric pain. No nausea, vomiting, or hematemesis; No diarrhea or constipation. No melena or hematochezia.  GENITOURINARY: No dysuria, frequency, hematuria, or incontinence  NEUROLOGICAL: No headaches, memory loss, loss of strength, numbness, or tremors  SKIN: No itching, burning, rashes, or lesions   LYMPH NODES: No enlarged glands  ENDOCRINE: No heat or cold intolerance; No hair loss  MUSCULOSKELETAL: No joint pain or swelling; No muscle, back, or extremity pain  PSYCHIATRIC: No depression, anxiety, mood swings, or difficulty sleeping  HEME/LYMPH: No easy bruising, or bleeding gums  ALLERY AND IMMUNOLOGIC: No hives or eczema    RADIOLOGY & ADDITIONAL TESTS:    Imaging Personally Reviewed:  [ ] YES  [ ] NO    Consultant(s) Notes Reviewed:  [ ] YES  [ ] NO    PHYSICAL EXAM:  GENERAL: NAD, well-groomed, well-developed  HEAD:  Atraumatic, Normocephalic  EYES: EOMI, PERRLA, conjunctiva and sclera clear  ENMT: No tonsillar erythema, exudates, or enlargement; Moist mucous membranes, Good dentition, No lesions  NECK: Supple, No JVD, Normal thyroid  NERVOUS SYSTEM:  Alert & Oriented X3, Good concentration; Motor Strength 5/5 B/L upper and lower extremities; DTRs 2+ intact and symmetric  CHEST/LUNG: Clear to percussion bilaterally; No rales, rhonchi, wheezing, or rubs  HEART: Regular rate and rhythm; No murmurs, rubs, or gallops  ABDOMEN: Soft, Nontender, Nondistended; Bowel sounds present  EXTREMITIES:  2+ Peripheral Pulses, No clubbing, cyanosis, or edema  LYMPH: No lymphadenopathy noted  SKIN: No rashes or lesions    Care Discussed with Consultants/Other Providers [ ] YES  [ ] NO Patient is a 89y old  Female who presents with a chief complaint of fall and altered mental status (18 Feb 2018 13:32)      INTERVAL HPI/OVERNIGHT EVENTS:  No overnight events, No new complaints.  Attempt to wean patient off 02, when on RA, desaturates to 70s, mid 90s on 2L NC, downtitrated from 4 to 2L NC.    T(C): 36.3 (02-25-18 @ 11:35), Max: 37.2 (02-25-18 @ 04:15)  HR: 53 (02-25-18 @ 11:35) (44 - 53)  BP: 127/65 (02-25-18 @ 11:35) (127/65 - 167/60)  RR: 18 (02-25-18 @ 11:35) (18 - 19)  SpO2: 100% (02-25-18 @ 11:35) (99% - 100%)  Wt(kg): --  I&O's Summary    24 Feb 2018 07:01  -  25 Feb 2018 07:00  --------------------------------------------------------  IN: 680 mL / OUT: 600 mL / NET: 80 mL    25 Feb 2018 07:01  -  25 Feb 2018 13:24  --------------------------------------------------------  IN: 520 mL / OUT: 0 mL / NET: 520 mL        LABS:                        9.8    3.80  )-----------( 126      ( 25 Feb 2018 08:38 )             31.3     02-25    139  |  93<L>  |  29<H>  ----------------------------<  157<H>  3.2<L>   |  33<H>  |  1.18    Ca    10.1      25 Feb 2018 08:19  Phos  3.3     02-25  Mg     2.0     02-25          CAPILLARY BLOOD GLUCOSE      POCT Blood Glucose.: 212 mg/dL (25 Feb 2018 11:33)  POCT Blood Glucose.: 170 mg/dL (25 Feb 2018 07:37)  POCT Blood Glucose.: 153 mg/dL (24 Feb 2018 21:11)  POCT Blood Glucose.: 124 mg/dL (24 Feb 2018 16:36)    REVIEW OF SYSTEMS:  CONSTITUTIONAL: No fever, weight loss, or fatigue  EYES: No eye pain, visual disturbances, or discharge  ENMT:  No difficulty hearing, tinnitus, vertigo; No sinus or throat pain  NECK: No pain or stiffness  RESPIRATORY: No cough, wheezing, chills or hemoptysis; No SOB, O2 dependent   CARDIOVASCULAR: No chest pain, palpitations, dizziness  GASTROINTESTINAL: No abdominal or epigastric pain. No nausea, vomiting, or hematemesis; No diarrhea or constipation. No melena or hematochezia.  GENITOURINARY: No dysuria, frequency, hematuria, or incontinence  NEUROLOGICAL: No headaches, no dizziness  SKIN: No itching, burning, rashes, or lesions   MUSCULOSKELETAL: No joint pain or swelling; No muscle, back, or extremity pain  PSYCHIATRIC: No depression, anxiety  HEME/LYMPH: No easy bruising, or bleeding gums    RADIOLOGY & ADDITIONAL TESTS:    Imaging Personally Reviewed:  [ ] YES  [X ] NO  No recent imaging     Consultant(s) Notes Reviewed:  [X ] YES  [ ] NO    PHYSICAL EXAM:  GENERAL: NAD, well-groomed, well-developed  HEAD:  Atraumatic, Normocephalic  EYES: EOMI, PERRLA, conjunctiva and sclera clear  ENMT: No tonsillar erythema, exudates, MMM  NECK: Supple, No JVD  NERVOUS SYSTEM:  Alert & Oriented X3, No focal deficits  CHEST/LUNG: Bibasilar rales, rhonchi, wheezing, or rubs  HEART: Regular rate and rhythm; No murmurs, rubs, or gallops  ABDOMEN: Soft, Nontender, Nondistended; Bowel sounds present  EXTREMITIES:  No peripheral edema  SKIN: No rashes or lesions    Care Discussed with Consultants/Other Providers [ ] YES  [X ] NO

## 2018-02-25 NOTE — PROGRESS NOTE ADULT - PROBLEM SELECTOR PLAN 9
- DVT PPx: SCDs   - Full code     Dispo:  pending volume optimization and oxygen requirements. Per PT dispo to rehab, but pt prefers home with home PT, if home, dispo with home 02 as patient desaturates to 70s on RA.    Nuzhat Avalos, PGY-1   [phone]

## 2018-02-25 NOTE — PROGRESS NOTE ADULT - PROBLEM SELECTOR PLAN 3
Resolved, patient has been afebrile and is s/p 7 days of zosyn  - spiked a fever on 2/14 with no found source: UA not convincing for UTI and pt s/p 3 day course of CTX, CXR: no focal consolidation, B/C from 2/14 NTD  - RUQ U/S obtained in setting of transaminitis with no evidence of stones, and no tenderness to suggest cholecystitis, however mild thickening and gallbladder sludge.  - Patient now afebrile, ctm fever curve and cbc

## 2018-02-25 NOTE — PROGRESS NOTE ADULT - PROBLEM SELECTOR PLAN 6
- fall at nursing facility with no reported loss of consciousness, as per daughter, has remained alert and oriented as per baseline  - CTH performed x 2, no evidence of worsening of the left subdural collection. No intervention planned by neurosurgery  - fall and aspiration precautions

## 2018-02-25 NOTE — PROGRESS NOTE ADULT - PROBLEM SELECTOR PLAN 5
- resolved, AAO x 2-3 and mentating well  - s/p fall with CTH not showing any evidence of acute ICH, and with decreased overall left subdural collection  -c/b dementia and advanced age

## 2018-02-25 NOTE — PROGRESS NOTE ADULT - PROBLEM SELECTOR PROBLEM 8
Advanced care planning/counseling discussion
Atrial fibrillation
Advanced care planning/counseling discussion
Atrial fibrillation
Advanced care planning/counseling discussion
Advanced care planning/counseling discussion
Atrial fibrillation

## 2018-02-25 NOTE — PROGRESS NOTE ADULT - PROBLEM SELECTOR PROBLEM 9
Advanced care planning/counseling discussion

## 2018-02-26 LAB
ANION GAP SERPL CALC-SCNC: 16 MMOL/L — SIGNIFICANT CHANGE UP (ref 5–17)
BUN SERPL-MCNC: 31 MG/DL — HIGH (ref 7–23)
CALCIUM SERPL-MCNC: 10.3 MG/DL — SIGNIFICANT CHANGE UP (ref 8.4–10.5)
CHLORIDE SERPL-SCNC: 92 MMOL/L — LOW (ref 96–108)
CO2 SERPL-SCNC: 32 MMOL/L — HIGH (ref 22–31)
CREAT SERPL-MCNC: 1.28 MG/DL — SIGNIFICANT CHANGE UP (ref 0.5–1.3)
GLUCOSE BLDC GLUCOMTR-MCNC: 109 MG/DL — HIGH (ref 70–99)
GLUCOSE BLDC GLUCOMTR-MCNC: 146 MG/DL — HIGH (ref 70–99)
GLUCOSE BLDC GLUCOMTR-MCNC: 156 MG/DL — HIGH (ref 70–99)
GLUCOSE BLDC GLUCOMTR-MCNC: 161 MG/DL — HIGH (ref 70–99)
GLUCOSE BLDC GLUCOMTR-MCNC: 166 MG/DL — HIGH (ref 70–99)
GLUCOSE SERPL-MCNC: 139 MG/DL — HIGH (ref 70–99)
MAGNESIUM SERPL-MCNC: 2.1 MG/DL — SIGNIFICANT CHANGE UP (ref 1.6–2.6)
PHOSPHATE SERPL-MCNC: 3.4 MG/DL — SIGNIFICANT CHANGE UP (ref 2.5–4.5)
POTASSIUM SERPL-MCNC: 3.8 MMOL/L — SIGNIFICANT CHANGE UP (ref 3.5–5.3)
POTASSIUM SERPL-SCNC: 3.8 MMOL/L — SIGNIFICANT CHANGE UP (ref 3.5–5.3)
SODIUM SERPL-SCNC: 140 MMOL/L — SIGNIFICANT CHANGE UP (ref 135–145)

## 2018-02-26 PROCEDURE — 99233 SBSQ HOSP IP/OBS HIGH 50: CPT | Mod: GC

## 2018-02-26 PROCEDURE — 99233 SBSQ HOSP IP/OBS HIGH 50: CPT

## 2018-02-26 RX ORDER — FUROSEMIDE 40 MG
80 TABLET ORAL
Qty: 0 | Refills: 0 | Status: DISCONTINUED | OUTPATIENT
Start: 2018-02-27 | End: 2018-02-27

## 2018-02-26 RX ORDER — FUROSEMIDE 40 MG
80 TABLET ORAL ONCE
Qty: 0 | Refills: 0 | Status: COMPLETED | OUTPATIENT
Start: 2018-02-26 | End: 2018-02-26

## 2018-02-26 RX ADMIN — Medication 5 UNIT(S): at 16:37

## 2018-02-26 RX ADMIN — Medication 80 MILLIGRAM(S): at 17:12

## 2018-02-26 RX ADMIN — Medication 80 MILLIGRAM(S): at 05:51

## 2018-02-26 RX ADMIN — Medication 2: at 07:59

## 2018-02-26 RX ADMIN — Medication 5 UNIT(S): at 08:00

## 2018-02-26 RX ADMIN — INSULIN GLARGINE 8 UNIT(S): 100 INJECTION, SOLUTION SUBCUTANEOUS at 22:15

## 2018-02-26 RX ADMIN — Medication 5 UNIT(S): at 12:25

## 2018-02-26 RX ADMIN — LOSARTAN POTASSIUM 100 MILLIGRAM(S): 100 TABLET, FILM COATED ORAL at 05:51

## 2018-02-26 NOTE — PROGRESS NOTE ADULT - SUBJECTIVE AND OBJECTIVE BOX
Patient seen and examined at bedside.  Breathing improved; currently on 2L NC; attempting to be weaned down to 1L.  Otherwise no other new/acute complaints.          Medications:  acetaminophen   Tablet. 650 milliGRAM(s) Oral every 6 hours PRN  aluminum hydroxide/magnesium hydroxide/simethicone Suspension 30 milliLiter(s) Oral every 4 hours PRN  aluminum hydroxide/magnesium hydroxide/simethicone Suspension 30 milliLiter(s) Oral every 6 hours PRN  dextrose 5%. 1000 milliLiter(s) IV Continuous <Continuous>  dextrose 50% Injectable 12.5 Gram(s) IV Push once  dextrose 50% Injectable 25 Gram(s) IV Push once  dextrose 50% Injectable 25 Gram(s) IV Push once  dextrose Gel 1 Dose(s) Oral once PRN  furosemide   Injectable 80 milliGRAM(s) IV Push two times a day  glucagon  Injectable 1 milliGRAM(s) IntraMuscular once PRN  insulin glargine Injectable (LANTUS) 8 Unit(s) SubCutaneous at bedtime  insulin lispro (HumaLOG) corrective regimen sliding scale   SubCutaneous three times a day before meals  insulin lispro (HumaLOG) corrective regimen sliding scale   SubCutaneous at bedtime  insulin lispro Injectable (HumaLOG) 5 Unit(s) SubCutaneous three times a day before meals  losartan 100 milliGRAM(s) Oral daily      PAST MEDICAL & SURGICAL HISTORY:  SDH (subdural hematoma)  Cervical cancer: Stage I per the daughter s/p hysterectomy april 2000 (complicated by: adhesions / pna)  Endocarditis: 2010 (treated w/ antibiotics in [facility])  Hypertension  Diabetes mellitus  Atrial fibrillation: on aspirin (last taken 1/2/2018)  H/O: hysterectomy  History of tracheostomy: april 2000 as a complication from her hysterectomy - developed pna and was unable to be weaned off the vent    REVIEW OF SYSTEMS:    CONSTITUTIONAL: No weakness, fevers or chills  EYES/ENT: No visual changes;  No vertigo or throat pain   NECK: No pain or stiffness  RESPIRATORY: No cough, wheezing, hemoptysis; No shortness of breath  CARDIOVASCULAR: No chest pain or palpitations  GASTROINTESTINAL: No abdominal or epigastric pain. No nausea, vomiting, or hematemesis; No diarrhea or constipation. No melena or hematochezia.  GENITOURINARY: No dysuria, frequency or hematuria  NEUROLOGICAL: No numbness or weakness  SKIN: No itching, rashes      Vitals:  T(F): 97.7 (02-26), Max: 97.8 (02-25)  HR: 47 (02-26) (45 - 53)  BP: 126/50 (02-26) (126/50 - 148/71)  RR: 17 (02-26)  SpO2: 100% (02-26)  I&O's Summary    25 Feb 2018 07:01  -  26 Feb 2018 07:00  --------------------------------------------------------  IN: 720 mL / OUT: 1185 mL / NET: -465 mL    26 Feb 2018 07:01  -  26 Feb 2018 10:36  --------------------------------------------------------  IN: 220 mL / OUT: 700 mL / NET: -480 mL        Physical Exam:  Appearance: No acute distress; well appearing  Eyes: PERRL, EOMI, pink conjunctiva  HENT: Normal oral mucosa  Cardiovascular: RRR, S1, S2, harsh iii/vi systolic murmur audible throughout the precordium, radiating to the apex no rubs, or gallops; JVD ~ 3 cm   Respiratory: Scant scattered crackles w/ no wheezing/rhonchi   Gastrointestinal: soft, non-tender, non-distended with normal bowel sounds  Musculoskeletal: No clubbing; no joint deformity; slight pitting edema; much improved   Neurologic: Non-focal  Lymphatic: No lymphadenopathy  Psychiatry: AAOx3, mood & affect appropriate  Skin: No rashes, ecchymoses, or cyanosis                          9.8    3.80  )-----------( 126      ( 25 Feb 2018 08:38 )             31.3     02-26    140  |  92<L>  |  31<H>  ----------------------------<  139<H>  3.8   |  32<H>  |  1.28    Ca    10.3      26 Feb 2018 07:27  Phos  3.4     02-26  Mg     2.1     02-26      Interpretation of Telemetry: aifb 40s-50s       Assessment     89 yo woman w/ hx of A-fib/flutter (not on AC previous, unclear why), mod AS DM, recent SDH s/p left craniotomy w/ evacuation 1/4/2018, cervical cancer s/p hysterectomy various years ago who presented again for recurrent mechanical fall.  In acute on chronic diastolic heart failure.  Likely hypertension (SBP 150s-160s mostly looking back) induced in the setting of not being optimized on her diuretic dose (only on 20 mg daily PO).  Also has valvulopathy (severe AS) which is contributing.  Has diuresed well last week w/ IV lasix as well as metolazone.  BP much improved w/ PO losartan.      Plan  - continue IV furosemide 80 BID for today; transition to PO lasix 80 mg BID tomorrow   - replace K to maintain >4, keep mg>2   - cont to monitor I/Os, UOP  - attempt to wean NC    - fluid restrict to 1.5L daily   - cont losartan 100 mg daily    Vinay Rodriguez MD   [place] [facility] [id number] Patient seen and examined at bedside.  Breathing improved; currently on 2L NC; attempting to be weaned down to 1L.  Otherwise no other new/acute complaints.          Medications:  acetaminophen   Tablet. 650 milliGRAM(s) Oral every 6 hours PRN  aluminum hydroxide/magnesium hydroxide/simethicone Suspension 30 milliLiter(s) Oral every 4 hours PRN  aluminum hydroxide/magnesium hydroxide/simethicone Suspension 30 milliLiter(s) Oral every 6 hours PRN  dextrose 5%. 1000 milliLiter(s) IV Continuous <Continuous>  dextrose 50% Injectable 12.5 Gram(s) IV Push once  dextrose 50% Injectable 25 Gram(s) IV Push once  dextrose 50% Injectable 25 Gram(s) IV Push once  dextrose Gel 1 Dose(s) Oral once PRN  furosemide   Injectable 80 milliGRAM(s) IV Push two times a day  glucagon  Injectable 1 milliGRAM(s) IntraMuscular once PRN  insulin glargine Injectable (LANTUS) 8 Unit(s) SubCutaneous at bedtime  insulin lispro (HumaLOG) corrective regimen sliding scale   SubCutaneous three times a day before meals  insulin lispro (HumaLOG) corrective regimen sliding scale   SubCutaneous at bedtime  insulin lispro Injectable (HumaLOG) 5 Unit(s) SubCutaneous three times a day before meals  losartan 100 milliGRAM(s) Oral daily      PAST MEDICAL & SURGICAL HISTORY:  SDH (subdural hematoma)  Cervical cancer: Stage I per the daughter s/p hysterectomy april 2000 (complicated by: adhesions / pna)  Endocarditis: 2010 (treated w/ antibiotics in [facility])  Hypertension  Diabetes mellitus  Atrial fibrillation: on aspirin (last taken 1/2/2018)  H/O: hysterectomy  History of tracheostomy: april 2000 as a complication from her hysterectomy - developed pna and was unable to be weaned off the vent    REVIEW OF SYSTEMS:    CONSTITUTIONAL: No weakness, fevers or chills  EYES/ENT: No visual changes;  No vertigo or throat pain   NECK: No pain or stiffness  RESPIRATORY: No cough, wheezing, hemoptysis; No shortness of breath  CARDIOVASCULAR: No chest pain or palpitations  GASTROINTESTINAL: No abdominal or epigastric pain. No nausea, vomiting, or hematemesis; No diarrhea or constipation. No melena or hematochezia.  GENITOURINARY: No dysuria, frequency or hematuria  NEUROLOGICAL: No numbness or weakness  SKIN: No itching, rashes      Vitals:  T(F): 97.7 (02-26), Max: 97.8 (02-25)  HR: 47 (02-26) (45 - 53)  BP: 126/50 (02-26) (126/50 - 148/71)  RR: 17 (02-26)  SpO2: 100% (02-26)    Physical Exam:  Appearance: No acute distress; well appearing  Eyes: PERRL, EOMI, pink conjunctiva  HENT: Normal oral mucosa  Cardiovascular: RRR, S1, S2, harsh iii/vi systolic murmur audible throughout the precordium, radiating to the apex no rubs, or gallops; JVD ~ 3 cm   Respiratory: Scant scattered crackles w/ no wheezing/rhonchi   Gastrointestinal: soft, non-tender, non-distended with normal bowel sounds  Musculoskeletal: No clubbing; no joint deformity; slight pitting edema; much improved   Neurologic: Non-focal  Lymphatic: No lymphadenopathy  Psychiatry: AAOx3, mood & affect appropriate  Skin: No rashes, ecchymoses, or cyanosis    Interpretation of Telemetry: KAVIN Dye 40s-50s     I&O's Summary    25 Feb 2018 07:01  -  26 Feb 2018 07:00  --------------------------------------------------------  IN: 720 mL / OUT: 1185 mL / NET: -465 mL    26 Feb 2018 07:01  -  26 Feb 2018 10:36  --------------------------------------------------------  IN: 220 mL / OUT: 700 mL / NET: -480 mL      LABS:                        9.8    3.80  )-----------( 126      ( 25 Feb 2018 08:38 )             31.3     02-26    140  |  92<L>  |  31<H>  ----------------------------<  139<H>  3.8   |  32<H>  |  1.28    Ca    10.3      26 Feb 2018 07:27  Phos  3.4     02-26  Mg     2.1     02-26      ASSESSMENT:    88 yo woman w/ hx of A-fib/flutter (not on AC previous, unclear why), mod AS DM, recent SDH s/p left craniotomy w/ evacuation 1/4/2018  Presented again for recurrent mechanical fall, in acute on chronic diastolic heart failure on admission.  Likely hypertension (SBP 150s-160s mostly looking back) induced in the setting of not being optimized on her diuretic dose (only on 20 mg daily PO); also has valvulopathy (severe AS) which is contributing.    Has diuresed well last week w/ IV lasix as well as metolazone.  BP much improved w/ PO losartan.      REC:  - continue IV furosemide 80 BID for today; transition to PO furosemide 80 mg BID tomorrow   - replace K to maintain >4, keep mg>2   - cont to monitor I/Os, UOP  - attempt to wean NC    - fluid restrict to 1.5L daily   - cont losartan 100 mg daily    Vinay Rodriguez MD   [place] [facility] [id number]

## 2018-02-26 NOTE — PROGRESS NOTE ADULT - ATTENDING COMMENTS
Transition to oral lasix today.   Tentative d/c tomorrow home, pending on clinical course.   Family declined rehab placement.

## 2018-02-26 NOTE — PROGRESS NOTE ADULT - PROBLEM SELECTOR PLAN 1
Admitted with respiratory failure   Crackles throughout lungs up to mid lung fields and unable to lie flat on admission    - daily weights  - No betancourt per family, attempt to maintain strict I/O despite no betancourt  - TTE in 1/2018: EF 75%, moderate MS and moderate AS  - TTE 2/21: EF 68%, severe AS; patient poor candidate for TAVR because patient has hx of SDH and is not on AC.  - s/p metolazone   - c/w losartan to 100 daily  - C/w lasix 80 IV BID  - CTM BMP, stable   - Volume overload significantly improved  - Consider transitioning to po furosemide  - f/u cardio reccs Admitted with respiratory failure   Crackles throughout lungs up to mid lung fields and unable to lie flat on admission    - daily weights  - No betancourt per family, attempt to maintain strict I/O despite no betancourt  - TTE in 1/2018: EF 75%, moderate MS and moderate AS  - TTE 2/21: EF 68%, severe AS; patient poor candidate for TAVR because patient has hx of SDH and is not on AC.  - s/p metolazone   - c/w losartan to 100 daily  - C/w lasix 80 IV BID  - CTM BMP, stable   - would like to transition to a PO lasix regimen, will f/u with cards  - f/u cardio reccs Admitted with respiratory failure   Crackles throughout lungs up to mid lung fields and unable to lie flat on admission    - daily weights  - No betancourt per family, attempt to maintain strict I/O despite no betancourt  - TTE in 1/2018: EF 75%, moderate MS and moderate AS  - TTE 2/21: EF 68%, severe AS; patient poor candidate for TAVR because patient has hx of SDH and is not on AC.  - s/p metolazone   - c/w losartan to 100 daily  - C/w lasix 80 IV BID, can transition to lasix 80 PO BID tomorrow per cards  - CTM BMP, stable   - would like to transition to a PO lasix regimen, will f/u with cards  - f/u cardio reccs  - I have examined the patient and the patient requires home oxygen due to low sats 2/2 to acute on chronic heart failure.  Patient desaturates to 70s on RA. Patient has acute on chronic heart failure. Patient is in a chronic stable state. Admitted with respiratory failure   Crackles throughout lungs up to mid lung fields and unable to lie flat on admission    - daily weights  - No betancourt per family, attempt to maintain strict I/O despite no betancourt  - TTE in 1/2018: EF 75%, moderate MS and moderate AS  - TTE 2/21: EF 68%, severe AS; patient poor candidate for TAVR because patient has hx of SDH and is not on AC.  - s/p metolazone   - c/w losartan to 100 daily  - C/w lasix 80 IV BID, can transition to lasix 80 PO BID tomorrow per cards  - CTM BMP, stable   - would like to transition to a PO lasix regimen, will f/u with cards  - f/u cardio reccs  - I have examined the patient and the patient requires home oxygen due to low sats 2/2 to acute on chronic heart failure.  Patient desaturates to 70% on RA. Patient has acute on chronic heart failure. Patient is in a chronic stable state.

## 2018-02-26 NOTE — PROGRESS NOTE ADULT - SUBJECTIVE AND OBJECTIVE BOX
Patient is a 89y old  Female who presents with a chief complaint of fall and altered mental status (18 Feb 2018 13:32)      SUBJECTIVE / OVERNIGHT EVENTS:  No overnight events.  Patient notes that sob is improving.      MEDICATIONS  (STANDING):  dextrose 5%. 1000 milliLiter(s) (50 mL/Hr) IV Continuous <Continuous>  dextrose 50% Injectable 12.5 Gram(s) IV Push once  dextrose 50% Injectable 25 Gram(s) IV Push once  dextrose 50% Injectable 25 Gram(s) IV Push once  furosemide   Injectable 80 milliGRAM(s) IV Push two times a day  insulin glargine Injectable (LANTUS) 8 Unit(s) SubCutaneous at bedtime  insulin lispro (HumaLOG) corrective regimen sliding scale   SubCutaneous three times a day before meals  insulin lispro (HumaLOG) corrective regimen sliding scale   SubCutaneous at bedtime  insulin lispro Injectable (HumaLOG) 5 Unit(s) SubCutaneous three times a day before meals  losartan 100 milliGRAM(s) Oral daily    MEDICATIONS  (PRN):  acetaminophen   Tablet. 650 milliGRAM(s) Oral every 6 hours PRN mild to moderate pain  aluminum hydroxide/magnesium hydroxide/simethicone Suspension 30 milliLiter(s) Oral every 4 hours PRN Dyspepsia  aluminum hydroxide/magnesium hydroxide/simethicone Suspension 30 milliLiter(s) Oral every 6 hours PRN Dyspepsia  dextrose Gel 1 Dose(s) Oral once PRN Blood Glucose LESS THAN 70 milliGRAM(s)/deciliter  glucagon  Injectable 1 milliGRAM(s) IntraMuscular once PRN Glucose LESS THAN 70 milligrams/deciliter      I&O's Summary    25 Feb 2018 07:01  -  26 Feb 2018 07:00  --------------------------------------------------------  IN: 720 mL / OUT: 1185 mL / NET: -465 mL    26 Feb 2018 07:01  -  26 Feb 2018 08:38  --------------------------------------------------------  IN: 0 mL / OUT: 500 mL / NET: -500 mL    Vital Signs Last 24 Hrs  T(C): 36.5 (26 Feb 2018 04:12), Max: 36.6 (25 Feb 2018 20:47)  T(F): 97.7 (26 Feb 2018 04:12), Max: 97.8 (25 Feb 2018 20:47)  HR: 47 (26 Feb 2018 05:46) (45 - 53)  BP: 126/50 (26 Feb 2018 05:46) (126/50 - 148/71)  BP(mean): --  RR: 17 (26 Feb 2018 08:00) (17 - 19)  SpO2: 100% (26 Feb 2018 05:46) (100% - 100%)    PHYSICAL EXAM:  GENERAL: on 4L O2 nasal cannula, lethargic  HEAD:  Atraumatic, Normocephalic  EYES: EOMI, conjunctiva and sclera clear  NECK: Supple, No JVD  CHEST/LUNG: crackles through lung fields up to mid lung, no wheezing  HEART: Regular rate and rhythm; Grade III systolic murmur loudest at right sternal border but heard throughout  ABDOMEN: Soft, Nontender, Nondistended; Bowel sounds present  EXTREMITIES:  peripheral pulses unable to be palpated, No clubbing, cyanosis, 1+ LE edema  NEUROLOGY: A&Ox2  SKIN: No rashes or lesions      LABS:                                        9.8    3.80  )-----------( 126      ( 25 Feb 2018 08:38 )             31.3       02-26    140  |  92<L>  |  31<H>  ----------------------------<  139<H>  3.8   |  32<H>  |  1.28    Ca    10.3      26 Feb 2018 07:27  Phos  3.4     02-26  Mg     2.1     02-26             RESPIRATORY  VENT:    ABG:     VBG:     RADIOLOGY & ADDITIONAL TESTS:    Imaging Personally Reviewed:    Consultant(s) Notes Reviewed:      Care Discussed with Consultants/Other Providers:

## 2018-02-26 NOTE — CHART NOTE - NSCHARTNOTEFT_GEN_A_CORE
Nutrition Follow-up     88 yo woman w/ hx of A-fib/flutter (not on AC previous, unclear why), mod AS DM, recent SDH S/P left craniotomy w/ evacuation 2018  Presented again for recurrent mechanical fall, in acute on chronic diastolic heart failure on admission. Pt diuresed well last week per cardiology.     Source: Patient [x]    Family [x]     other [X] Comprehensive review of medical records     Diet : Soft, low sodium, 1500ml fluid restriction, Glucerna x2/day.       No reports of N+V. Last BM . Pt's daughter reports pt's po intake is now improved on soft diet (% of meals). Pt also drinks Glucerna x2/day. Pt would like to try Strawberry flavor Glucerna, no other food preferences at this time.      PO intake:  [x]   %      Source for PO intake [x] Patient [x] family [x] chart [ ] staff [ ] other     Enteral /Parenteral Nutrition: n/a       Current Weight: Pt's wt appears stable since last week, : 156.5 pounds -> : 156 pounds.       Pertinent Medications: MEDICATIONS  (STANDING):  dextrose 5%. 1000 milliLiter(s) (50 mL/Hr) IV Continuous <Continuous>  dextrose 50% Injectable 12.5 Gram(s) IV Push once  dextrose 50% Injectable 25 Gram(s) IV Push once  dextrose 50% Injectable 25 Gram(s) IV Push once  furosemide   Injectable 80 milliGRAM(s) IV Push once  insulin glargine Injectable (LANTUS) 8 Unit(s) SubCutaneous at bedtime  insulin lispro (HumaLOG) corrective regimen sliding scale   SubCutaneous three times a day before meals  insulin lispro (HumaLOG) corrective regimen sliding scale   SubCutaneous at bedtime  insulin lispro Injectable (HumaLOG) 5 Unit(s) SubCutaneous three times a day before meals  losartan 100 milliGRAM(s) Oral daily    MEDICATIONS  (PRN):  acetaminophen   Tablet. 650 milliGRAM(s) Oral every 6 hours PRN mild to moderate pain  aluminum hydroxide/magnesium hydroxide/simethicone Suspension 30 milliLiter(s) Oral every 4 hours PRN Dyspepsia  aluminum hydroxide/magnesium hydroxide/simethicone Suspension 30 milliLiter(s) Oral every 6 hours PRN Dyspepsia  dextrose Gel 1 Dose(s) Oral once PRN Blood Glucose LESS THAN 70 milliGRAM(s)/deciliter  glucagon  Injectable 1 milliGRAM(s) IntraMuscular once PRN Glucose LESS THAN 70 milligrams/deciliter    Pertinent Labs:   Na140 mmol/L Glu 139 mg/dL<H> K+ 3.8 mmol/L Cr  1.28 mg/dL BUN 31 mg/dL<H> Phos 3.4 mg/dL Alb n/a   PAB n/a     PCOT B/25-: 100-212mg/dL     Skin: 1+ dependent, winnie. LE edema, skin intact     Estimated Needs:   [x] no change since previous assessment  [ ] recalculated:       Previous Nutrition Diagnosis:      [x]Inadequate Oral Intake         Nutrition Diagnosis is [x] much improved on soft diet with Glucerna supplements.          New Nutrition Diagnosis: [x] not applicable        Interventions:   Continue Glucerna x2/day. Consider adding Consistent Carbohydrate to diet order.     Recommend  1) Continue Glucerna x2/day.   2) Encourage po intake with nutrient dense foods.   3) Provide food preferences as able.   4) Monitor weight, lab values, skin, po intake and GI tolerance.      Monitoring and Evaluation:   follow up per protocol    RD to remain available for further nutritional interventions as indicated.   Avani Taylor, MS, RD, CDN #[phone]

## 2018-02-26 NOTE — PROGRESS NOTE ADULT - PROBLEM SELECTOR PLAN 2
- hx of bradycardia on recent admission with metoprolol discontinued prior to discharge  - remains bradycardic with rates into the 50s, and asymptomatic: mentating well with appropriate pressures    - seen by EP with no emergent planned intervention

## 2018-02-26 NOTE — PROGRESS NOTE ADULT - ASSESSMENT
89 year old female with PMHx of A-fib not on AC 2/2 subdural hematoma, T2DM, HTN, and cervical cancer s/p hysterectomy who presents after a fall with encephalopathy from outside nursing facility.  Patient now in acute decompensated heart failure 2/2 to severe AS demonstrated on TTE.

## 2018-02-27 VITALS
OXYGEN SATURATION: 100 % | SYSTOLIC BLOOD PRESSURE: 141 MMHG | RESPIRATION RATE: 18 BRPM | DIASTOLIC BLOOD PRESSURE: 67 MMHG | HEART RATE: 54 BPM | TEMPERATURE: 98 F

## 2018-02-27 LAB
ANION GAP SERPL CALC-SCNC: 14 MMOL/L — SIGNIFICANT CHANGE UP (ref 5–17)
BUN SERPL-MCNC: 32 MG/DL — HIGH (ref 7–23)
CALCIUM SERPL-MCNC: 10.2 MG/DL — SIGNIFICANT CHANGE UP (ref 8.4–10.5)
CHLORIDE SERPL-SCNC: 92 MMOL/L — LOW (ref 96–108)
CO2 SERPL-SCNC: 32 MMOL/L — HIGH (ref 22–31)
CREAT SERPL-MCNC: 1.23 MG/DL — SIGNIFICANT CHANGE UP (ref 0.5–1.3)
GLUCOSE BLDC GLUCOMTR-MCNC: 154 MG/DL — HIGH (ref 70–99)
GLUCOSE BLDC GLUCOMTR-MCNC: 161 MG/DL — HIGH (ref 70–99)
GLUCOSE BLDC GLUCOMTR-MCNC: 192 MG/DL — HIGH (ref 70–99)
GLUCOSE SERPL-MCNC: 139 MG/DL — HIGH (ref 70–99)
HCT VFR BLD CALC: 32.2 % — LOW (ref 34.5–45)
HGB BLD-MCNC: 10.3 G/DL — LOW (ref 11.5–15.5)
MAGNESIUM SERPL-MCNC: 2.2 MG/DL — SIGNIFICANT CHANGE UP (ref 1.6–2.6)
MCHC RBC-ENTMCNC: 29 PG — SIGNIFICANT CHANGE UP (ref 27–34)
MCHC RBC-ENTMCNC: 32 GM/DL — SIGNIFICANT CHANGE UP (ref 32–36)
MCV RBC AUTO: 90.7 FL — SIGNIFICANT CHANGE UP (ref 80–100)
PHOSPHATE SERPL-MCNC: 3.1 MG/DL — SIGNIFICANT CHANGE UP (ref 2.5–4.5)
PLATELET # BLD AUTO: 129 K/UL — LOW (ref 150–400)
POTASSIUM SERPL-MCNC: 4 MMOL/L — SIGNIFICANT CHANGE UP (ref 3.5–5.3)
POTASSIUM SERPL-SCNC: 4 MMOL/L — SIGNIFICANT CHANGE UP (ref 3.5–5.3)
RBC # BLD: 3.55 M/UL — LOW (ref 3.8–5.2)
RBC # FLD: 17.1 % — HIGH (ref 10.3–14.5)
SODIUM SERPL-SCNC: 138 MMOL/L — SIGNIFICANT CHANGE UP (ref 135–145)
WBC # BLD: 3.5 K/UL — LOW (ref 3.8–10.5)
WBC # FLD AUTO: 3.5 K/UL — LOW (ref 3.8–10.5)

## 2018-02-27 PROCEDURE — 82553 CREATINE MB FRACTION: CPT

## 2018-02-27 PROCEDURE — 87798 DETECT AGENT NOS DNA AMP: CPT

## 2018-02-27 PROCEDURE — 84100 ASSAY OF PHOSPHORUS: CPT

## 2018-02-27 PROCEDURE — 85610 PROTHROMBIN TIME: CPT

## 2018-02-27 PROCEDURE — 81001 URINALYSIS AUTO W/SCOPE: CPT

## 2018-02-27 PROCEDURE — 80048 BASIC METABOLIC PNL TOTAL CA: CPT

## 2018-02-27 PROCEDURE — 86901 BLOOD TYPING SEROLOGIC RH(D): CPT

## 2018-02-27 PROCEDURE — 82962 GLUCOSE BLOOD TEST: CPT

## 2018-02-27 PROCEDURE — 94640 AIRWAY INHALATION TREATMENT: CPT

## 2018-02-27 PROCEDURE — 84484 ASSAY OF TROPONIN QUANT: CPT

## 2018-02-27 PROCEDURE — 85730 THROMBOPLASTIN TIME PARTIAL: CPT

## 2018-02-27 PROCEDURE — 86850 RBC ANTIBODY SCREEN: CPT

## 2018-02-27 PROCEDURE — 99285 EMERGENCY DEPT VISIT HI MDM: CPT | Mod: 25

## 2018-02-27 PROCEDURE — 71045 X-RAY EXAM CHEST 1 VIEW: CPT

## 2018-02-27 PROCEDURE — 86900 BLOOD TYPING SEROLOGIC ABO: CPT

## 2018-02-27 PROCEDURE — 80053 COMPREHEN METABOLIC PANEL: CPT

## 2018-02-27 PROCEDURE — 93306 TTE W/DOPPLER COMPLETE: CPT

## 2018-02-27 PROCEDURE — 99232 SBSQ HOSP IP/OBS MODERATE 35: CPT | Mod: GC

## 2018-02-27 PROCEDURE — 84132 ASSAY OF SERUM POTASSIUM: CPT

## 2018-02-27 PROCEDURE — 97116 GAIT TRAINING THERAPY: CPT

## 2018-02-27 PROCEDURE — 87186 SC STD MICRODIL/AGAR DIL: CPT

## 2018-02-27 PROCEDURE — 87086 URINE CULTURE/COLONY COUNT: CPT

## 2018-02-27 PROCEDURE — 82947 ASSAY GLUCOSE BLOOD QUANT: CPT

## 2018-02-27 PROCEDURE — 82330 ASSAY OF CALCIUM: CPT

## 2018-02-27 PROCEDURE — 87486 CHLMYD PNEUM DNA AMP PROBE: CPT

## 2018-02-27 PROCEDURE — 76705 ECHO EXAM OF ABDOMEN: CPT

## 2018-02-27 PROCEDURE — 82550 ASSAY OF CK (CPK): CPT

## 2018-02-27 PROCEDURE — 82803 BLOOD GASES ANY COMBINATION: CPT

## 2018-02-27 PROCEDURE — 72170 X-RAY EXAM OF PELVIS: CPT

## 2018-02-27 PROCEDURE — 96374 THER/PROPH/DIAG INJ IV PUSH: CPT

## 2018-02-27 PROCEDURE — 84443 ASSAY THYROID STIM HORMONE: CPT

## 2018-02-27 PROCEDURE — 82565 ASSAY OF CREATININE: CPT

## 2018-02-27 PROCEDURE — 93005 ELECTROCARDIOGRAM TRACING: CPT

## 2018-02-27 PROCEDURE — 97530 THERAPEUTIC ACTIVITIES: CPT

## 2018-02-27 PROCEDURE — 87633 RESP VIRUS 12-25 TARGETS: CPT

## 2018-02-27 PROCEDURE — 97162 PT EVAL MOD COMPLEX 30 MIN: CPT

## 2018-02-27 PROCEDURE — C9254: CPT

## 2018-02-27 PROCEDURE — 87040 BLOOD CULTURE FOR BACTERIA: CPT

## 2018-02-27 PROCEDURE — 93970 EXTREMITY STUDY: CPT

## 2018-02-27 PROCEDURE — 72125 CT NECK SPINE W/O DYE: CPT

## 2018-02-27 PROCEDURE — 85014 HEMATOCRIT: CPT

## 2018-02-27 PROCEDURE — 99239 HOSP IP/OBS DSCHRG MGMT >30: CPT

## 2018-02-27 PROCEDURE — 82435 ASSAY OF BLOOD CHLORIDE: CPT

## 2018-02-27 PROCEDURE — 83605 ASSAY OF LACTIC ACID: CPT

## 2018-02-27 PROCEDURE — 83735 ASSAY OF MAGNESIUM: CPT

## 2018-02-27 PROCEDURE — 70450 CT HEAD/BRAIN W/O DYE: CPT

## 2018-02-27 PROCEDURE — 85027 COMPLETE CBC AUTOMATED: CPT

## 2018-02-27 PROCEDURE — 83930 ASSAY OF BLOOD OSMOLALITY: CPT

## 2018-02-27 PROCEDURE — 87581 M.PNEUMON DNA AMP PROBE: CPT

## 2018-02-27 PROCEDURE — 84295 ASSAY OF SERUM SODIUM: CPT

## 2018-02-27 RX ORDER — INSULIN GLARGINE 100 [IU]/ML
8 INJECTION, SOLUTION SUBCUTANEOUS
Qty: 0 | Refills: 0 | COMMUNITY
Start: 2018-02-27

## 2018-02-27 RX ORDER — FUROSEMIDE 40 MG
1 TABLET ORAL
Qty: 28 | Refills: 0 | OUTPATIENT
Start: 2018-02-27 | End: 2018-03-12

## 2018-02-27 RX ORDER — LOSARTAN POTASSIUM 100 MG/1
1 TABLET, FILM COATED ORAL
Qty: 14 | Refills: 0 | OUTPATIENT
Start: 2018-02-27 | End: 2018-03-12

## 2018-02-27 RX ORDER — LEVETIRACETAM 250 MG/1
500 TABLET, FILM COATED ORAL
Qty: 0 | Refills: 0 | COMMUNITY

## 2018-02-27 RX ORDER — INSULIN LISPRO 100/ML
5 VIAL (ML) SUBCUTANEOUS
Qty: 0 | Refills: 0 | COMMUNITY

## 2018-02-27 RX ORDER — LEVETIRACETAM 250 MG/1
0 TABLET, FILM COATED ORAL
Qty: 0 | Refills: 0 | COMMUNITY

## 2018-02-27 RX ADMIN — Medication 5 UNIT(S): at 12:14

## 2018-02-27 RX ADMIN — Medication 2: at 08:23

## 2018-02-27 RX ADMIN — LOSARTAN POTASSIUM 100 MILLIGRAM(S): 100 TABLET, FILM COATED ORAL at 05:55

## 2018-02-27 RX ADMIN — Medication 80 MILLIGRAM(S): at 16:54

## 2018-02-27 RX ADMIN — Medication 2: at 16:54

## 2018-02-27 RX ADMIN — Medication 5 UNIT(S): at 16:54

## 2018-02-27 RX ADMIN — Medication 80 MILLIGRAM(S): at 05:55

## 2018-02-27 RX ADMIN — Medication 2: at 12:14

## 2018-02-27 RX ADMIN — Medication 5 UNIT(S): at 08:23

## 2018-02-27 NOTE — PROGRESS NOTE ADULT - SUBJECTIVE AND OBJECTIVE BOX
Patient seen and examined at bedside.  Breathing improved; currently on 2L NC.  Otherwise no other new/acute complaints.          Medications:  acetaminophen   Tablet. 650 milliGRAM(s) Oral every 6 hours PRN  aluminum hydroxide/magnesium hydroxide/simethicone Suspension 30 milliLiter(s) Oral every 4 hours PRN  aluminum hydroxide/magnesium hydroxide/simethicone Suspension 30 milliLiter(s) Oral every 6 hours PRN  dextrose 5%. 1000 milliLiter(s) IV Continuous <Continuous>  dextrose 50% Injectable 12.5 Gram(s) IV Push once  dextrose 50% Injectable 25 Gram(s) IV Push once  dextrose 50% Injectable 25 Gram(s) IV Push once  dextrose Gel 1 Dose(s) Oral once PRN  furosemide   Injectable 80 milliGRAM(s) IV Push two times a day  glucagon  Injectable 1 milliGRAM(s) IntraMuscular once PRN  insulin glargine Injectable (LANTUS) 8 Unit(s) SubCutaneous at bedtime  insulin lispro (HumaLOG) corrective regimen sliding scale   SubCutaneous three times a day before meals  insulin lispro (HumaLOG) corrective regimen sliding scale   SubCutaneous at bedtime  insulin lispro Injectable (HumaLOG) 5 Unit(s) SubCutaneous three times a day before meals  losartan 100 milliGRAM(s) Oral daily      PAST MEDICAL & SURGICAL HISTORY:  SDH (subdural hematoma)  Cervical cancer: Stage I per the daughter s/p hysterectomy april 2000 (complicated by: adhesions / pna)  Endocarditis: 2010 (treated w/ antibiotics in [facility])  Hypertension  Diabetes mellitus  Atrial fibrillation: on aspirin (last taken 1/2/2018)  H/O: hysterectomy  History of tracheostomy: april 2000 as a complication from her hysterectomy - developed pna and was unable to be weaned off the vent    REVIEW OF SYSTEMS:    CONSTITUTIONAL: No weakness, fevers or chills  EYES/ENT: No visual changes;  No vertigo or throat pain   NECK: No pain or stiffness  RESPIRATORY: No cough, wheezing, hemoptysis; No shortness of breath  CARDIOVASCULAR: No chest pain or palpitations  GASTROINTESTINAL: No abdominal or epigastric pain. No nausea, vomiting, or hematemesis; No diarrhea or constipation. No melena or hematochezia.  GENITOURINARY: No dysuria, frequency or hematuria  NEUROLOGICAL: No numbness or weakness  SKIN: No itching, rashes      Vitals:  T(F): 97.7 (02-26), Max: 97.8 (02-25)  HR: 47 (02-26) (45 - 53)  BP: 126/50 (02-26) (126/50 - 148/71)  RR: 17 (02-26)  SpO2: 100% (02-26)    Physical Exam:  Appearance: No acute distress; well appearing  Eyes: PERRL, EOMI, pink conjunctiva  HENT: Normal oral mucosa  Cardiovascular: RRR, S1, S2, harsh iii/vi systolic murmur audible throughout the precordium, radiating to the apex no rubs, or gallops; JVD ~ 3 cm   Respiratory: Scant scattered crackles w/ no wheezing/rhonchi   Gastrointestinal: soft, non-tender, non-distended with normal bowel sounds  Musculoskeletal: No clubbing; no joint deformity; slight pitting edema; much improved   Neurologic: Non-focal  Lymphatic: No lymphadenopathy  Psychiatry: AAOx3, mood & affect appropriate  Skin: No rashes, ecchymoses, or cyanosis    Interpretation of Telemetry: KAVIN Dye 40s-50s     I&O's Summary    25 Feb 2018 07:01  -  26 Feb 2018 07:00  --------------------------------------------------------  IN: 720 mL / OUT: 1185 mL / NET: -465 mL    26 Feb 2018 07:01  -  26 Feb 2018 10:36  --------------------------------------------------------  IN: 220 mL / OUT: 700 mL / NET: -480 mL      LABS:                        9.8    3.80  )-----------( 126      ( 25 Feb 2018 08:38 )             31.3     02-26    140  |  92<L>  |  31<H>  ----------------------------<  139<H>  3.8   |  32<H>  |  1.28    Ca    10.3      26 Feb 2018 07:27  Phos  3.4     02-26  Mg     2.1     02-26      ASSESSMENT:    89 yo woman w/ hx of A-fib/flutter (not on AC previous, unclear why), mod AS DM, recent SDH s/p left craniotomy w/ evacuation 1/4/2018. Presented again for recurrent mechanical fall, in acute on chronic diastolic heart failure on admission.  Likely hypertension (SBP 150s-160s mostly looking back) induced in the setting of not being optimized on her diuretic dose (only on 20 mg daily PO); also has valvulopathy (severe AS) which is contributing.  Has diuresed well last week w/ IV lasix as well as metolazone; now on PO lasix.  BP much improved w/ PO losartan.      REC:  - cont PO furosemide 80 mg BID, losartan 100 mg daily   - replace K to maintain >4, keep mg>2   - cont to monitor I/Os, UOP, fluid restrict to 1.5L daily   - no cardiac contraindications for discharge today w/ home O2 (seems like 2L NC is her baseline)     Vinay Rodriguez MD   [place] [facility] [id number] Patient seen and examined at bedside.  Breathing improved; currently on 2L NC.  Otherwise no other new/acute complaints.        Medications:  acetaminophen   Tablet. 650 milliGRAM(s) Oral every 6 hours PRN  aluminum hydroxide/magnesium hydroxide/simethicone Suspension 30 milliLiter(s) Oral every 4 hours PRN  aluminum hydroxide/magnesium hydroxide/simethicone Suspension 30 milliLiter(s) Oral every 6 hours PRN  dextrose 5%. 1000 milliLiter(s) IV Continuous <Continuous>  dextrose 50% Injectable 12.5 Gram(s) IV Push once  dextrose 50% Injectable 25 Gram(s) IV Push once  dextrose 50% Injectable 25 Gram(s) IV Push once  dextrose Gel 1 Dose(s) Oral once PRN  furosemide   Injectable 80 milliGRAM(s) IV Push two times a day  glucagon  Injectable 1 milliGRAM(s) IntraMuscular once PRN  insulin glargine Injectable (LANTUS) 8 Unit(s) SubCutaneous at bedtime  insulin lispro (HumaLOG) corrective regimen sliding scale   SubCutaneous three times a day before meals  insulin lispro (HumaLOG) corrective regimen sliding scale   SubCutaneous at bedtime  insulin lispro Injectable (HumaLOG) 5 Unit(s) SubCutaneous three times a day before meals  losartan 100 milliGRAM(s) Oral daily    PAST MEDICAL & SURGICAL HISTORY:  SDH (subdural hematoma)  Cervical cancer: Stage I per the daughter s/p hysterectomy april 2000 (complicated by: adhesions / pna)  Endocarditis: 2010 (treated w/ antibiotics in [facility])  Hypertension  Diabetes mellitus  Atrial fibrillation: on aspirin (last taken 1/2/2018)  H/O: hysterectomy  History of tracheostomy: april 2000 as a complication from her hysterectomy - developed pna and was unable to be weaned off the vent    REVIEW OF SYSTEMS:    CONSTITUTIONAL: No weakness, fevers or chills  EYES/ENT: No visual changes;  No vertigo or throat pain   NECK: No pain or stiffness  RESPIRATORY: No cough, wheezing, hemoptysis; No shortness of breath  CARDIOVASCULAR: No chest pain or palpitations  GASTROINTESTINAL: No abdominal or epigastric pain. No nausea, vomiting, or hematemesis; No diarrhea or constipation. No melena or hematochezia.  GENITOURINARY: No dysuria, frequency or hematuria  NEUROLOGICAL: No numbness or weakness  SKIN: No itching, rashes      Vitals:  T(F): 97.7 (02-26), Max: 97.8 (02-25)  HR: 47 (02-26) (45 - 53)  BP: 126/50 (02-26) (126/50 - 148/71)  RR: 17 (02-26)  SpO2: 100% (02-26)    Physical Exam:  Appearance: No acute distress; well appearing  Eyes: PERRL, EOMI, pink conjunctiva  HENT: Normal oral mucosa  Cardiovascular: RRR, S1, S2, harsh iii/vi systolic murmur audible throughout the precordium, radiating to the apex no rubs, or gallops; JVD ~ 3 cm   Respiratory: Scant scattered crackles w/ no wheezing/rhonchi   Gastrointestinal: soft, non-tender, non-distended with normal bowel sounds  Musculoskeletal: No clubbing; no joint deformity; slight pitting edema; much improved   Neurologic: Non-focal  Lymphatic: No lymphadenopathy  Psychiatry: AAOx3, mood & affect appropriate  Skin: No rashes, ecchymoses, or cyanosis    Interpretation of Telemetry: KAVIN Dye 40s-50s     I&O's Summary    25 Feb 2018 07:01  -  26 Feb 2018 07:00  --------------------------------------------------------  IN: 720 mL / OUT: 1185 mL / NET: -465 mL    26 Feb 2018 07:01  -  26 Feb 2018 10:36  --------------------------------------------------------  IN: 220 mL / OUT: 700 mL / NET: -480 mL      LABS:                        9.8    3.80  )-----------( 126      ( 25 Feb 2018 08:38 )             31.3     02-26    140  |  92<L>  |  31<H>  ----------------------------<  139<H>  3.8   |  32<H>  |  1.28    Ca    10.3      26 Feb 2018 07:27  Phos  3.4     02-26  Mg     2.1     02-26      ASSESSMENT:    88 yo woman w/ hx of A-fib/flutter (not on AC previous, unclear why), mod AS DM, recent SDH s/p left craniotomy w/ evacuation 1/4/2018. Presented again for recurrent mechanical fall, in acute on chronic diastolic heart failure on admission.  Likely hypertension (SBP 150s-160s mostly looking back) induced, in the setting of not being optimized on her diuretic dose (only on 20 mg daily PO); also has valvulopathy (severe AS) which is contributing.  Has diuresed well last week w/ IV Lasix as well as metolazone; now on PO Lasix.  BP much improved w/ PO losartan.      REC:  - cont PO Lasix 80 mg BID, losartan 100 mg daily   - replace K to maintain >4, keep mg>2   - cont to monitor I/Os, UOP, fluid restrict to 1.5L daily   - no cardiac contraindications for discharge today w/ home O2 (seems like 2L NC is her baseline)     Vinay Rodriguez MD   [place] [facility] [id number]      Clark Sethi M.D.  Cardiology Consult Service  [phone] or [phone]

## 2018-02-27 NOTE — PROGRESS NOTE ADULT - ATTENDING COMMENTS
Patient on oral lasix, to be continued on discharge.   F/U with cardiology as an outpatient.  Home O2 has been set up.   D/C home today.      55 minutes spent coordinating discharge

## 2018-02-27 NOTE — PROGRESS NOTE ADULT - PROBLEM SELECTOR PROBLEM 5
CRISTHIAN (acute kidney injury)
Encephalopathy
Elevated liver enzymes
CRISTHIAN (acute kidney injury)
CRISTHIAN (acute kidney injury)
Encephalopathy
CRISTHIAN (acute kidney injury)
Encephalopathy
Encephalopathy
SDH (subdural hematoma)
Encephalopathy
Encephalopathy

## 2018-02-27 NOTE — PROGRESS NOTE ADULT - PROBLEM SELECTOR PROBLEM 7
Atrial fibrillation
CRISTHIAN (acute kidney injury)
Advanced care planning/counseling discussion
Advanced care planning/counseling discussion
Atrial fibrillation
CRISTHIAN (acute kidney injury)
Advanced care planning/counseling discussion
Atrial fibrillation
Atrial fibrillation
CRISTHIAN (acute kidney injury)
CRISTHIAN (acute kidney injury)
Elevated liver enzymes
CRISTHIAN (acute kidney injury)
CRISTHIAN (acute kidney injury)

## 2018-02-27 NOTE — PROGRESS NOTE ADULT - PROBLEM SELECTOR PROBLEM 1
Acute heart failure, unspecified heart failure type
Fever of unknown origin
SOB (shortness of breath)
Acute heart failure, unspecified heart failure type
Encephalopathy
Fever of unknown origin
SOB (shortness of breath)
Encephalopathy
Acute heart failure, unspecified heart failure type
Encephalopathy

## 2018-02-27 NOTE — PROGRESS NOTE ADULT - NSHPATTENDINGPLANDISCUSS_GEN_ALL_CORE
to reach Cardiology Attending call during weekdays [facility] [phone].
cardiology fellow, Dr. KAVIN Rodriguez; patient seen and examined.  Hx., exam and labs as above.  I agree with the assessment and recommendations.
cardiology fellow, Dr. KAVIN Rodriguez; patient seen and examined.  Hx., exam and labs as above.  I agree with the assessment and recommendations.
to reach Cardiology Attending call during weekdays [facility] [phone].

## 2018-02-27 NOTE — PROGRESS NOTE ADULT - PROBLEM SELECTOR PROBLEM 4
CRISTHIAN (acute kidney injury)
Bradycardia
SDH (subdural hematoma)
SDH (subdural hematoma)
Bradycardia
Hyponatremia
SDH (subdural hematoma)
SDH (subdural hematoma)
Bradycardia
Bradycardia
Hyponatremia
Bradycardia

## 2018-02-27 NOTE — PROGRESS NOTE ADULT - PROBLEM SELECTOR PLAN 7
- DVT PPx: SCDs   - Full code     Dispo:  pending volume optimization and oxygen requirements. Per PT dispo to rehab, but pt prefers home with home PT, if home, dispo with home 02 as patient desaturates to 70% at rest on RA.    Patient desaturates to 70% at rest on RA. Patient has acute on chronic heart failure. Patient is in a chronic stable state.    Nuzhat Avalos, PGY-1   [phone]

## 2018-02-27 NOTE — PROGRESS NOTE ADULT - PROBLEM SELECTOR PLAN 4
- hx of bradycardia on recent admission with metoprolol discontinued prior to discharge  - remains bradycardic with rates into the 50s, and asymptomatic: mentating well with appropriate pressures    - seen by EP with no emergent planned intervention
- new onset hyponatremia: likely 2/2 SIADH in the setting of right hip pain and nausea  - urine studies pending collection
- creatinine continues to trend down, etiology 2/2 fluid overload with improvement of creatinine on diuresis   - renally dose meds  - avoid nephrotoxic agents
- fall at nursing facility this morning with no reported loss of conciousness, as per daughter, has remained alert and oriented as per baseline  - CTH performed x 2, no evidence of worsening of the left subdural collection. No intervention planned by NSVIDA   - q4 neuro checks, fall precautions  - family wants patient to go home - - will go home with home PT services
- fall at nursing facility with no reported loss of consciousness, as per daughter, has remained alert and oriented as per baseline  - CTH performed x 2, no evidence of worsening of the left subdural collection. No intervention planned by neurosurgery  - fall and aspiration precautions
- hx of bradycardia on recent admission with metoprolol discontinued prior to discharge  - remains bradycardic with rates into the 50s, and asymptomatic: mentating well with appropriate pressures    - seen by EP with no emergent planned intervention
- fall at nursing facility this morning with no reported loss of conciousness, as per daughter, has remained alert and oriented as per baseline  - CTH performed x 2, no evidence of worsening of the left subdural collection. No intervention planned by NSVIDA   - q4 neuro checks, fall precautions  - family wants patient to go home - - will go home with home PT services
- fall at nursing facility with no reported loss of consciousness, as per daughter, has remained alert and oriented as per baseline  - CTH performed x 2, no evidence of worsening of the left subdural collection. No intervention planned by neurosurgery  - fall and aspiration precautions
- hx of bradycardia on recent admission with metoprolol discontinued prior to discharge  - remains bradycardic with rates into the 50s, and asymptomatic: mentating well with appropriate pressures    - seen by EP with no emergent planned intervention
- resolved with Na of 139 today - - 9 point correction  - mentation much improved  - urine studies never collected 2/2 urinary incontinence but likely 2/2 SIADH from pain and nausea
- hx of bradycardia on recent admission with metoprolol discontinued prior to discharge  - remains bradycardic with rates into the 50s, and asymptomatic: mentating well with appropriate pressures    - seen by EP with no emergent planned intervention
- hx of bradycardia on recent admission with metoprolol discontinued prior to discharge  - remains bradycardic with rates into the 50s, and asymptomatic: mentating well with appropriate pressures    - seen by EP with no emergent planned intervention

## 2018-02-27 NOTE — PROGRESS NOTE ADULT - SUBJECTIVE AND OBJECTIVE BOX
Patient is a 89y old  Female who presents with a chief complaint of fall and altered mental status (18 Feb 2018 13:32)      SUBJECTIVE / OVERNIGHT EVENTS:  No overnight events.  Patient notes that sob is improving.      MEDICATIONS  (STANDING):  dextrose 5%. 1000 milliLiter(s) (50 mL/Hr) IV Continuous <Continuous>  dextrose 50% Injectable 12.5 Gram(s) IV Push once  dextrose 50% Injectable 25 Gram(s) IV Push once  dextrose 50% Injectable 25 Gram(s) IV Push once  furosemide    Tablet 80 milliGRAM(s) Oral two times a day  insulin glargine Injectable (LANTUS) 8 Unit(s) SubCutaneous at bedtime  insulin lispro (HumaLOG) corrective regimen sliding scale   SubCutaneous three times a day before meals  insulin lispro (HumaLOG) corrective regimen sliding scale   SubCutaneous at bedtime  insulin lispro Injectable (HumaLOG) 5 Unit(s) SubCutaneous three times a day before meals  losartan 100 milliGRAM(s) Oral daily    MEDICATIONS  (PRN):  acetaminophen   Tablet. 650 milliGRAM(s) Oral every 6 hours PRN mild to moderate pain  aluminum hydroxide/magnesium hydroxide/simethicone Suspension 30 milliLiter(s) Oral every 4 hours PRN Dyspepsia  aluminum hydroxide/magnesium hydroxide/simethicone Suspension 30 milliLiter(s) Oral every 6 hours PRN Dyspepsia  dextrose Gel 1 Dose(s) Oral once PRN Blood Glucose LESS THAN 70 milliGRAM(s)/deciliter  glucagon  Injectable 1 milliGRAM(s) IntraMuscular once PRN Glucose LESS THAN 70 milligrams/deciliter    I&O's Summary    26 Feb 2018 07:01  -  27 Feb 2018 07:00  --------------------------------------------------------  IN: 790 mL / OUT: 1200 mL / NET: -410 mL    27 Feb 2018 07:01  -  27 Feb 2018 10:29  --------------------------------------------------------  IN: 280 mL / OUT: 0 mL / NET: 280 mL      Vital Signs Last 24 Hrs  T(C): 36.4 (27 Feb 2018 04:17), Max: 36.8 (26 Feb 2018 20:37)  T(F): 97.5 (27 Feb 2018 04:17), Max: 98.2 (26 Feb 2018 20:37)  HR: 54 (27 Feb 2018 08:00) (47 - 75)  BP: 127/49 (27 Feb 2018 05:47) (109/68 - 150/65)  BP(mean): --  RR: 19 (27 Feb 2018 04:17) (18 - 19)  SpO2: 100% (27 Feb 2018 04:17) (100% - 100%)    PHYSICAL EXAM:  GENERAL: on 2L O2 nasal cannula, lethargic  HEAD:  Atraumatic, Normocephalic  EYES: EOMI, conjunctiva and sclera clear  NECK: Supple, No JVD  CHEST/LUNG: crackles through lung fields up to mid lung, no wheezing  HEART: Regular rate and rhythm; Grade III systolic murmur loudest at right sternal border but heard throughout  ABDOMEN: Soft, Nontender, Nondistended; Bowel sounds present  EXTREMITIES:  peripheral pulses unable to be palpated, No clubbing, cyanosis, 1+ LE edema  NEUROLOGY: A&Ox2  SKIN: No rashes or lesions      LABS:             AM BMP pending                        10.3   3.50  )-----------( 129      ( 27 Feb 2018 07:49 )             32.2     02-26    140  |  92<L>  |  31<H>  ----------------------------<  139<H>  3.8   |  32<H>  |  1.28    Ca    10.3      26 Feb 2018 07:27  Phos  3.4     02-26  Mg     2.1     02-26                 RESPIRATORY  VENT:    ABG:     VBG:     RADIOLOGY & ADDITIONAL TESTS:    Imaging Personally Reviewed:    Consultant(s) Notes Reviewed:      Care Discussed with Consultants/Other Providers: Patient is a 89y old  Female who presents with a chief complaint of fall and altered mental status (18 Feb 2018 13:32)      SUBJECTIVE / OVERNIGHT EVENTS:  No overnight events.  Patient notes that sob is improving.      MEDICATIONS  (STANDING):  dextrose 5%. 1000 milliLiter(s) (50 mL/Hr) IV Continuous <Continuous>  dextrose 50% Injectable 12.5 Gram(s) IV Push once  dextrose 50% Injectable 25 Gram(s) IV Push once  dextrose 50% Injectable 25 Gram(s) IV Push once  furosemide    Tablet 80 milliGRAM(s) Oral two times a day  insulin glargine Injectable (LANTUS) 8 Unit(s) SubCutaneous at bedtime  insulin lispro (HumaLOG) corrective regimen sliding scale   SubCutaneous three times a day before meals  insulin lispro (HumaLOG) corrective regimen sliding scale   SubCutaneous at bedtime  insulin lispro Injectable (HumaLOG) 5 Unit(s) SubCutaneous three times a day before meals  losartan 100 milliGRAM(s) Oral daily    MEDICATIONS  (PRN):  acetaminophen   Tablet. 650 milliGRAM(s) Oral every 6 hours PRN mild to moderate pain  aluminum hydroxide/magnesium hydroxide/simethicone Suspension 30 milliLiter(s) Oral every 4 hours PRN Dyspepsia  aluminum hydroxide/magnesium hydroxide/simethicone Suspension 30 milliLiter(s) Oral every 6 hours PRN Dyspepsia  dextrose Gel 1 Dose(s) Oral once PRN Blood Glucose LESS THAN 70 milliGRAM(s)/deciliter  glucagon  Injectable 1 milliGRAM(s) IntraMuscular once PRN Glucose LESS THAN 70 milligrams/deciliter    I&O's Summary    26 Feb 2018 07:01  -  27 Feb 2018 07:00  --------------------------------------------------------  IN: 790 mL / OUT: 1200 mL / NET: -410 mL    27 Feb 2018 07:01  -  27 Feb 2018 10:29  --------------------------------------------------------  IN: 280 mL / OUT: 0 mL / NET: 280 mL      Vital Signs Last 24 Hrs  T(C): 36.4 (27 Feb 2018 04:17), Max: 36.8 (26 Feb 2018 20:37)  T(F): 97.5 (27 Feb 2018 04:17), Max: 98.2 (26 Feb 2018 20:37)  HR: 54 (27 Feb 2018 08:00) (47 - 75)  BP: 127/49 (27 Feb 2018 05:47) (109/68 - 150/65)  BP(mean): --  RR: 19 (27 Feb 2018 04:17) (18 - 19)  SpO2: 100% (27 Feb 2018 04:17) (100% - 100%)    PHYSICAL EXAM:  GENERAL: on 2L O2 nasal cannula, lethargic  HEAD:  Atraumatic, Normocephalic  EYES: EOMI, conjunctiva and sclera clear  NECK: Supple, No JVD  CHEST/LUNG: crackles through lung fields up to mid lung, no wheezing  HEART: Regular rate and rhythm; Grade III systolic murmur loudest at right sternal border but heard throughout  ABDOMEN: Soft, Nontender, Nondistended; Bowel sounds present  EXTREMITIES:  peripheral pulses unable to be palpated, No clubbing, cyanosis, 1+ LE edema  NEUROLOGY: A&Ox2  SKIN: No rashes or lesions      LABS:                              10.3   3.50  )-----------( 129      ( 27 Feb 2018 07:49 )             32.2     02-27    138  |  92<L>  |  32<H>  ----------------------------<  139<H>  4.0   |  32<H>  |  1.23    Ca    10.2      27 Feb 2018 09:26  Phos  3.1     02-27  Mg     2.2     02-27                   RESPIRATORY  VENT:    ABG:     VBG:     RADIOLOGY & ADDITIONAL TESTS:    Imaging Personally Reviewed:    Consultant(s) Notes Reviewed:      Care Discussed with Consultants/Other Providers:

## 2018-02-27 NOTE — PROGRESS NOTE ADULT - PROBLEM SELECTOR PLAN 1
Admitted with respiratory failure   Crackles throughout lungs up to mid lung fields and unable to lie flat on admission    - daily weights  - No betancourt per family, attempt to maintain strict I/O despite no betancourt  - TTE in 1/2018: EF 75%, moderate MS and moderate AS  - TTE 2/21: EF 68%, severe AS; patient poor candidate for TAVR because patient has hx of SDH and is not on AC.  - s/p metolazone   - c/w losartan to 100 daily  - C/w lasix 80 IV BID, can transition to lasix 80 PO BID tomorrow per cards  - CTM BMP, stable   - would like to transition to a PO lasix regimen, will f/u with cards  - f/u cardio reccs  - I have examined the patient and the patient requires home oxygen due to low sats 2/2 to acute on chronic heart failure.  Patient desaturates to 70% at rest on RA. Patient has acute on chronic heart failure. Patient is in a chronic stable state.

## 2018-02-27 NOTE — PROGRESS NOTE ADULT - PROBLEM SELECTOR PROBLEM 6
Elevated liver enzymes
SDH (subdural hematoma)
Atrial fibrillation
Atrial fibrillation
Elevated liver enzymes
SDH (subdural hematoma)
Atrial fibrillation
CRISTHIAN (acute kidney injury)
Elevated liver enzymes
Elevated liver enzymes
SDH (subdural hematoma)

## 2018-02-27 NOTE — PROGRESS NOTE ADULT - PROVIDER SPECIALTY LIST ADULT
Cardiology
Electrophysiology
Internal Medicine

## 2018-02-28 RX ORDER — LACOSAMIDE 50 MG/1
1 TABLET ORAL
Qty: 30 | Refills: 0 | OUTPATIENT
Start: 2018-02-28 | End: 2018-03-29

## 2018-05-01 ENCOUNTER — OUTPATIENT (OUTPATIENT)
Dept: OUTPATIENT SERVICES | Facility: HOSPITAL | Age: 83
LOS: 1 days | End: 2018-05-01
Payer: MEDICAID

## 2018-05-01 DIAGNOSIS — Z90.710 ACQUIRED ABSENCE OF BOTH CERVIX AND UTERUS: Chronic | ICD-10-CM

## 2018-05-01 DIAGNOSIS — Z98.890 OTHER SPECIFIED POSTPROCEDURAL STATES: Chronic | ICD-10-CM

## 2018-05-01 PROCEDURE — G9001: CPT

## 2018-05-06 DIAGNOSIS — R69 ILLNESS, UNSPECIFIED: ICD-10-CM

## 2018-07-09 NOTE — ED ADULT TRIAGE NOTE - MODE OF ARRIVAL
"Subjective:       Patient ID: Sameera Gordon is a 51 y.o. female.    Chief Complaint: Sinusitis (follow up)    Sinusitis   Pertinent negatives include no congestion or sneezing.      Patient had right maxillary sinusitis and bilateral ethmoid sinusitis, confirmed on CT imaging last month. This was treated with Augmentin, oral & nasal steroid, and nasal rinsing.  She returns today for recheck. She reports significant improvement. Within days of initiating antibiotics and steroid, she felt "lifted" from the heaviness she was experiencing. She states she did not realize "just how sick" she was until she began improving on treatment. No further facial pain.     Review of Systems   Constitutional: Negative.    HENT: Negative for congestion, rhinorrhea and sneezing.    Eyes: Negative.    Respiratory: Negative.    Cardiovascular: Negative.    Gastrointestinal: Negative.    Musculoskeletal: Negative.    Skin: Negative.    Neurological: Negative.    Hematological: Negative.    Psychiatric/Behavioral: Negative.        Objective:      Physical Exam   Constitutional: She is oriented to person, place, and time. Vital signs are normal. She appears well-developed and well-nourished. She is cooperative. She does not appear ill. No distress.   HENT:   Head: Normocephalic and atraumatic.   Right Ear: Hearing, tympanic membrane, external ear and ear canal normal. Tympanic membrane is not erythematous. No middle ear effusion.   Left Ear: Hearing, tympanic membrane, external ear and ear canal normal. Tympanic membrane is not erythematous.  No middle ear effusion.   Nose: No mucosal edema or rhinorrhea. Right sinus exhibits no maxillary sinus tenderness and no frontal sinus tenderness. Left sinus exhibits no maxillary sinus tenderness and no frontal sinus tenderness.   Mouth/Throat: Uvula is midline, oropharynx is clear and moist and mucous membranes are normal. Mucous membranes are not pale, not dry and not cyanotic. No oral lesions. " No oropharyngeal exudate, posterior oropharyngeal edema or posterior oropharyngeal erythema. Tonsils are 0 on the right. Tonsils are 0 on the left.   Eyes: Conjunctivae, EOM and lids are normal. Pupils are equal, round, and reactive to light. Right eye exhibits no discharge. Left eye exhibits no discharge. No scleral icterus.   Neck: Trachea normal and normal range of motion. Neck supple. No tracheal deviation present. No thyroid mass and no thyromegaly present.   Cardiovascular: Normal rate.    Pulmonary/Chest: Effort normal. No stridor. No respiratory distress. She has no wheezes.   Musculoskeletal: Normal range of motion.   Lymphadenopathy:        Head (right side): No submental, no submandibular, no tonsillar, no preauricular and no posterior auricular adenopathy present.        Head (left side): No submental, no submandibular, no tonsillar, no preauricular and no posterior auricular adenopathy present.     She has no cervical adenopathy.        Right cervical: No superficial cervical and no posterior cervical adenopathy present.       Left cervical: No superficial cervical and no posterior cervical adenopathy present.   Neurological: She is alert and oriented to person, place, and time. She has normal strength. Coordination and gait normal.   Skin: Skin is warm, dry and intact. No lesion and no rash noted. She is not diaphoretic. No cyanosis. No pallor.   Psychiatric: She has a normal mood and affect. Her speech is normal and behavior is normal. Judgment and thought content normal. Cognition and memory are normal.   Nursing note and vitals reviewed.      Assessment:     Sinusitis clinically resolved      Plan:     Discussed no need to repeat imaging, and imaging can lag behind clinical improvement.    Discussed prevention of relapse by continuing fluticasone and nasal saline rinsing.   Return to clinic as needed for further ENT concerns     EMS

## 2018-07-16 PROBLEM — C53.9 MALIGNANT NEOPLASM OF CERVIX UTERI, UNSPECIFIED: Chronic | Status: ACTIVE | Noted: 2018-01-04

## 2018-09-26 NOTE — ED ADULT NURSE NOTE - NS ED NURSE PATIENT LEFT UNIT TIME
THANK YOU FROM YOUR CARE TEAM    Our staff would like to THANK YOU for choosing [facility]. Our goal is to always provide you with the best of care and we continue to look for better ways to improve the services we provide.     You may receive a survey in the mail with questions specific to your encounter with our clinic. Should you receive a survey, please take a few minutes to rate your experience.   Our providers and staff value your feedback.  Thank you in advance for your time and participation.     We appreciate the opportunity to partner with you to meet your health care needs. THANK YOU, again, for choosing us to be your care team.     Medical Assistant: Moira  Provider: Mukesh Li MD  Care Coordinator:  ANABEL Gunn    [facility]  [address] [place] [address], [address]  [place] [id number]  Phone:  [phone]  Fax:  [phone]    Jen Javier, Manager Clinic operations  [email or web address]     [phone]                                            ...    Iris Bernabe    DURING YOUR VISIT TODAY        INJECTION    An injection has been ordered for you.  The order has been sent to your insurance company for pre-authorization.    Dr. Li will do the injection in the [facility], [address] of the [address] [facility] [address] at [address] [place] [address].     On the Day of Procedure:  · Take your usual medications.  · Make sure you take your blood pressure and/or heart medicines.     You have been given a copy of the Steroid Injections handout (WebMD 10/10/17) that contains general information regarding such injections.  Please call if you have any questions.        
01:52

## 2019-05-31 NOTE — DIETITIAN INITIAL EVALUATION ADULT. - WEIGHT IN KG
Body Location Override (Optional - Billing Will Still Be Based On Selected Body Map Location If Applicable): left thigh Add 15979 Cpt? (Important Note: In 2017 The Use Of 52921 Is Being Tracked By Cms To Determine Future Global Period Reimbursement For Global Periods): yes Detail Level: Simple 72.1

## 2019-06-16 NOTE — PROGRESS NOTE ADULT - PROBLEM SELECTOR PLAN 9
Female Urogenital HPI





- General


Chief complaint: Urogenital


Stated complaint: FEMALE , BLEEDING


Time Seen by Provider: 19 10:16


Source: patient, RN notes reviewed, old records reviewed


Mode of arrival: ambulatory


Limitations: no limitations





- History of Present Illness


Initial comments: 





Patient is a 46-year-old female, with history of breast cancer, currently on 

letrozole.  She presents emergency today with concerns for vaginal bleeding.  

She reports that she started have some lower abdominal cramping and noticed 

severe heavy vaginal bleeding this morning around 4 AM.  Patient states the 

bleeding has linus since that time.  She states that she's not had a menstrual 

period in over a year.  Patient states that her oncologist is Dr. Bourgeois.  She 

does see Dr. dye.  She reports that she's had no dysuria, or changes in 

stools.





- Related Data


                                Home Medications











 Medication  Instructions  Recorded  Confirmed


 


Multivitamins, Thera [Multivitamin 1 tab PO DAILY 12/15/17 12/27/18





(formulary)]   











                                    Allergies











Allergy/AdvReac Type Severity Reaction Status Date / Time


 


No Known Allergies Allergy   Verified 19 10:11














Review of Systems


ROS Statement: 


Those systems with pertinent positive or pertinent negative responses have been 

documented in the HPI.





ROS Other: All systems not noted in ROS Statement are negative.





Past Medical History


Past Medical History: Cancer


Additional Past Medical History / Comment(s): BREAST


History of Any Multi-Drug Resistant Organisms: None Reported


Past Surgical History: Breast Surgery,  Section, Tubal Ligation


Additional Past Surgical History / Comment(s): LEFT MASTECTOMY


Past Anesthesia/Blood Transfusion Reactions: No Reported Reaction


Past Psychological History: No Psychological Hx Reported


Smoking Status: Current every day smoker


Past Alcohol Use History: None Reported


Past Drug Use History: None Reported





General Exam





- General Exam Comments


Initial Comments: 





46-year-old female.  Alert and oriented.  No distress.


Limitations: no limitations


General appearance: alert, in no apparent distress


Head exam: Present: atraumatic, normocephalic, normal inspection


Eye exam: Present: normal appearance, PERRL, EOMI.  Absent: scleral icterus, 

conjunctival injection, periorbital swelling


ENT exam: Present: normal exam, mucous membranes moist


Neck exam: Present: normal inspection


Respiratory exam: Present: normal lung sounds bilaterally.  Absent: respiratory 

distress, wheezes, rales, rhonchi, stridor


Cardiovascular Exam: Present: regular rate, normal rhythm, normal heart sounds. 

Absent: systolic murmur, diastolic murmur, rubs, gallop, clicks


GI/Abdominal exam: Present: soft


External exam: Present: normal external exam


Speculum exam: Present: vaginal bleeding.  Absent: normal speculum exam


By manual exam: Present: normal by manual exam.  Absent: cervical motion 

tenderness, adnexal tenderness, adnexal mass


Extremities exam: Present: normal inspection, full ROM, normal capillary refill.

 Absent: tenderness, pedal edema, joint swelling, calf tenderness


Back exam: Present: normal inspection


Neurological exam: Present: alert, oriented X3, CN II-XII intact


Psychiatric exam: Present: normal affect, normal mood


Skin exam: Present: warm





Course


                                   Vital Signs











  19





  10:08


 


Temperature 98.1 F


 


Pulse Rate 90


 


Respiratory 20





Rate 


 


Blood Pressure 134/91


 


O2 Sat by Pulse 96





Oximetry 














Medical Decision Making





- Medical Decision Making





This is a 46-year-old female presents emergency room today for 1 day of vaginal 

bleeding.  Has a history of breast cancers, currently have much resolved for 

breast cancer treatment and hormonal induced menopause.  Patient reports that 

she started to have the bleeding heavily this morning.  No prior bleeding she's 

also on Depo-Provera.  Patient reports she follows with Dr. Bourgeois and Dr. Baker.

 This time patient's blood work was reviewed and unremarkable.  Stable 

hemoglobin.  Ultrasound shows evidence of a thickened endometrium likely related

to hormone use cannot rule out endometrial cancer without biopsy.  I discussed 

the findings with the Patient and advised that she follow-up with Dr. Baker for 

a biopsy and further treatment.  Patient states that his feel better after 

Toradol.  She still complained of some cramping consistent with heavy menstrual 

cycle.  We'll discharge the Patient does have a started pack for Ultram for 

cramping as well as Motrin and Tylenol and ice for pain.





- Lab Data


Result diagrams: 


                                 19 11:00





                                 19 11:00


                                   Lab Results











  19 Range/Units





  11:00 11:00 11:00 


 


WBC  9.3    (3.8-10.6)  k/uL


 


RBC  4.82    (3.80-5.40)  m/uL


 


Hgb  13.7    (11.4-16.0)  gm/dL


 


Hct  43.4    (34.0-46.0)  %


 


MCV  90.1    (80.0-100.0)  fL


 


MCH  28.4    (25.0-35.0)  pg


 


MCHC  31.5    (31.0-37.0)  g/dL


 


RDW  13.1    (11.5-15.5)  %


 


Plt Count  367    (150-450)  k/uL


 


Neutrophils %  52    %


 


Lymphocytes %  36    %


 


Monocytes %  7    %


 


Eosinophils %  2    %


 


Basophils %  0    %


 


Neutrophils #  4.9    (1.3-7.7)  k/uL


 


Lymphocytes #  3.3    (1.0-4.8)  k/uL


 


Monocytes #  0.6    (0-1.0)  k/uL


 


Eosinophils #  0.2    (0-0.7)  k/uL


 


Basophils #  0.0    (0-0.2)  k/uL


 


PT    9.9  (9.0-12.0)  sec


 


INR    0.9  (<1.2)  


 


APTT    24.8  (22.0-30.0)  sec


 


Sodium   139   (137-145)  mmol/L


 


Potassium   4.3   (3.5-5.1)  mmol/L


 


Chloride   108 H   ()  mmol/L


 


Carbon Dioxide   26   (22-30)  mmol/L


 


Anion Gap   5   mmol/L


 


BUN   20 H   (7-17)  mg/dL


 


Creatinine   0.69   (0.52-1.04)  mg/dL


 


Est GFR (CKD-EPI)AfAm   >90   (>60 ml/min/1.73 sqM)  


 


Est GFR (CKD-EPI)NonAf   >90   (>60 ml/min/1.73 sqM)  


 


Glucose   105 H   (74-99)  mg/dL


 


Calcium   9.1   (8.4-10.2)  mg/dL


 


Total Bilirubin   0.6   (0.2-1.3)  mg/dL


 


AST   22   (14-36)  U/L


 


ALT   25   (9-52)  U/L


 


Alkaline Phosphatase   84   ()  U/L


 


Total Protein   6.9   (6.3-8.2)  g/dL


 


Albumin   4.0   (3.5-5.0)  g/dL


 


Urine HCG, Qual     (Not Detectd)  














  19 Range/Units





  11:00 


 


WBC   (3.8-10.6)  k/uL


 


RBC   (3.80-5.40)  m/uL


 


Hgb   (11.4-16.0)  gm/dL


 


Hct   (34.0-46.0)  %


 


MCV   (80.0-100.0)  fL


 


MCH   (25.0-35.0)  pg


 


MCHC   (31.0-37.0)  g/dL


 


RDW   (11.5-15.5)  %


 


Plt Count   (150-450)  k/uL


 


Neutrophils %   %


 


Lymphocytes %   %


 


Monocytes %   %


 


Eosinophils %   %


 


Basophils %   %


 


Neutrophils #   (1.3-7.7)  k/uL


 


Lymphocytes #   (1.0-4.8)  k/uL


 


Monocytes #   (0-1.0)  k/uL


 


Eosinophils #   (0-0.7)  k/uL


 


Basophils #   (0-0.2)  k/uL


 


PT   (9.0-12.0)  sec


 


INR   (<1.2)  


 


APTT   (22.0-30.0)  sec


 


Sodium   (137-145)  mmol/L


 


Potassium   (3.5-5.1)  mmol/L


 


Chloride   ()  mmol/L


 


Carbon Dioxide   (22-30)  mmol/L


 


Anion Gap   mmol/L


 


BUN   (7-17)  mg/dL


 


Creatinine   (0.52-1.04)  mg/dL


 


Est GFR (CKD-EPI)AfAm   (>60 ml/min/1.73 sqM)  


 


Est GFR (CKD-EPI)NonAf   (>60 ml/min/1.73 sqM)  


 


Glucose   (74-99)  mg/dL


 


Calcium   (8.4-10.2)  mg/dL


 


Total Bilirubin   (0.2-1.3)  mg/dL


 


AST   (14-36)  U/L


 


ALT   (9-52)  U/L


 


Alkaline Phosphatase   ()  U/L


 


Total Protein   (6.3-8.2)  g/dL


 


Albumin   (3.5-5.0)  g/dL


 


Urine HCG, Qual  Not Detected  (Not Detectd)  














Disposition


Clinical Impression: 


 Abnormal uterine bleeding, Pelvic cramping





Disposition: HOME SELF-CARE


Condition: Good


Instructions (If sedation given, give patient instructions):  Dysfunctional 

Uterine Bleeding (ED)


Additional Instructions: 


Patient has an close follow-up with your oncologist as well as OB/GYN.  Return 

to emergency department if any alarming signs or symptoms occur.


Is patient prescribed a controlled substance at d/c from ED?: No


Referrals: 


Farideh Shepard MD [Primary Care Provider] - 1-2 days


Willow Baker MD [STAFF PHYSICIAN] - 1-2 days


Ag Bourgeois MD [STAFF PHYSICIAN] - 1-2 days


Time of Disposition: 13:13 - As per conversation with both daughters --- patient is FULL CODE  - DVT PPx: SCDs     Dispo:  pending volume optimization and oxygen requirements. Per PT rehab, but pt prefers home with home PT.     Nuzhat Avalos, PGY-1   [phone]

## 2019-06-28 NOTE — PATIENT PROFILE ADULT. - NS PRO OT REFERRAL QUES 1 YN
no

## 2020-02-24 NOTE — ED PROVIDER NOTE - CRITICAL CARE PROVIDED
Clinic hours for Dr. Ramon:  Monday 8:20am - 4:30pm  Tuesday 8:20am - 4:30pm  Wednesday 8:20am - 4:30pm  Thursday 8:20am - 4:30pm  Friday           Not in    If you need a refill on your prescription please call your pharmacy and let them know. Please be proactive and call before your medication runs out. The pharmacy will then contact us for the refill. Please allow 24-48 hours for the refill to be processed.     If your physician has ordered additional laboratory or radiology testing the results will be discussed at your next visit. This will allow you the opportunity to go over the results in person with your provider. If your results require immediate intervention, you will be contacted sooner by phone call.     You may be receiving a patient satisfaction survey in the mail. Please take the time to complete, as your feedback is very important to us. We strive to make your experience exceptional and your comments help us with that goal. We look forward to hearing from you.       Recommend to stay off methimazole.  Follow-up in 5 months is advised.  Blood test TSH with reflex free T4 one week before follow-up.  Advise to consider radioactive iodine therapy if hyperthyroidism persists.  Repeat thyroid ultrasound 2 weeks before follow-up appointment.    
additional history taking/interpretation of diagnostic studies/consultation with other physicians/conducted a detailed discussion of DNR status/consult w/ pt's family directly relating to pts condition/direct patient care (not related to procedure)/documentation

## 2021-02-18 NOTE — OCCUPATIONAL THERAPY INITIAL EVALUATION ADULT - ASSISTIVE DEVICE FOR TRANSFER: SIT/STAND, REHAB EVAL
rolling walker Advancement Flap (Single) Text: The defect edges were debeveled with a #15 scalpel blade.  Given the location of the defect and the proximity to free margins a single advancement flap was deemed most appropriate.  Using a sterile surgical marker, an appropriate advancement flap was drawn incorporating the defect and placing the expected incisions within the relaxed skin tension lines where possible.    The area thus outlined was incised deep to adipose tissue with a #15 scalpel blade.  The skin margins were undermined to an appropriate distance in all directions utilizing iris scissors.

## 2022-02-18 NOTE — DIETITIAN INITIAL EVALUATION ADULT. - WEIGHT IN LBS
States he has chf and was told to come to Ed for admission, has bilat leg swelling and some shortness of breath  
159

## 2022-03-21 NOTE — PHYSICAL THERAPY INITIAL EVALUATION ADULT - BED MOBILITY LIMITATIONS, REHAB EVAL
Patient Education     Prediabetes  You have been diagnosed with prediabetes. This means that the level of sugar (glucose) in your blood is too high. If you have prediabetes, you are at risk for developing type 2 diabetes. Type 2 diabetes is diagnosed when the level of glucose in the blood reaches a certain high level. With prediabetes, it hasn’t reached this point yet. But it's higher than normal. It is vital to make lifestyle changes to lower your blood sugar, improve your health, and prevent diabetes.       Why worry about prediabetes?  Prediabetes is a condition where the body’s cells have trouble using glucose in the blood for energy. As a result, too much glucose stays in the blood. This can affect how your heart and blood vessels work. Without changes in diet and lifestyle, the problem can get worse. Once you have type 2 diabetes, it's ongoing (chronic). It needs to be managed for the rest of your life. Diabetes can harm the body and your health by damaging organs, such as your eyes and kidneys. It makes you more likely to have heart disease. And it can damage nerves and blood vessels.   Who is a risk for prediabetes?  The exact cause of prediabetes is not clear. But certain risk factors make a person more likely to have it. These include:   · A family history of type 2 diabetes  · Being overweight  · Being older than age 45  · Have high blood pressure or high cholesterol   · Having had gestational diabetes  · Not being physically active  · Being ,  American, , , , or   Diagnosing prediabetes  Prediabetes may have no symptoms. Or you may have some of the symptoms of diabetes (see below). The diagnosis is made with a blood test. You may have 1 or more of these blood tests:    · Fasting glucose test. Blood is taken and tested after you have fasted (not eaten) for at least 8 hours. A normal test result is 99 milligrams per deciliter (mg/dL)  or lower. Prediabetes is 100 mg/dL to 125 mg/dL. Diabetes is 126 mg/dL or higher.  · Glucose tolerance test. Your blood sugar is measured before and after you drink a very sugary liquid. A normal test result is 139 milligrams per deciliter (mg/dL) or lower. Prediabetes is 140 mg/dL to 199 mg/dL. Diabetes is 200 mg/dL or higher.  · Hemoglobin A1c (HbA1c). Your HbA1c is normal if it is below 5.7%. Prediabetes is 5.7% to 6.4%. Diabetes is 6.5% or higher.   Treating prediabetes  The best way to treat prediabetes is to lose at least 5% to 7% of your current weight and be more physically active by getting at least 150 minutes a week of physical activity (at least 30 minutes daily.) When sitting for long periods of time, get up for short sessions of light activity every 30 minutes. These changes help the body’s cells use blood sugar better. Even a small amount of weight loss can help. Work with your healthcare provider to make a plan to eat well and be more active. Keep in mind that small changes can add up. Other changes in your lifestyle (or even taking certain medicines, such as metformin) may make you less likely to develop diabetes. Your provider can talk with you about these. Stopping smoking will decrease your risk of developing diabetes. Don't use e-cigarettes or vaping products. But you may gain some weight if you are not careful.   Ask your healthcare provider for a referral to a lifestyle intervention program. This program will help you get to and stay at a 7% weight loss and increase physical activity.   Follow-up  If not treated, prediabetes can turn into diabetes. This is a serious health condition. Take steps to stop this from happening. Follow the treatment plan you have been given. You may have your blood glucose tested again in about 12 to 18 months.   Diabetes symptoms   Let your healthcare provider know if you have any of the following:  · Always feel very tired  · Feel very thirsty or hungry much of  the time  · Have to urinate often  · Lose weight for no reason  · Feel numbness or tingling in your fingers or toes  · Have cuts or bruises that don’t seem to heal  · Have blurry vision  Jabari last reviewed this educational content on 6/1/2019 © 2000-2020 [employer]. [address] [place] [address], [place] [address]. All rights reserved. This information is not intended as a substitute for professional medical care. Always follow your healthcare professional's instructions.         Patient Education     Understanding Carbohydrates    A car needs the right type of fuel to run. And you need the right kind of food to function. To keep your energy level up, your body needs food that has carbohydrates. But carbs raise blood sugar levels higher and faster than other kinds of food. Your dietitian will work with you to figure out the amount of carbs you need. Carbs come in 3 types: starches, sugars, and fiber.  Starches  Starches are found in grains, some vegetables, and beans. Grain products include bread, pasta, cereal, and tortillas. Starchy vegetables include potatoes, peas, corn, lima beans, yams, and squash. Kidney beans, malave beans, black beans, garbanzo beans, and lentils also have starches.  Sugars  Sugars are found naturally in many foods. Or they can be added. Foods that contain natural sugar include fruits and fruit juices, dairy products, honey, and molasses. Added sugars are found in most desserts, processed foods, candy, regular soda, and fruit drinks. These are very helpful to treat low blood sugar (hypoglycemia). They give you sugar quickly. Try to keep at least 15 to 20 grams of these simple sugars with you at all times. Eat or drink these if you start to have symptoms of low blood sugar.  Fiber  Fiber comes from plant foods. Your body can't digest most fiber. Instead of raising blood sugar levels like other carbs, fiber stops blood sugar from rising too fast. Fiber is found in fruits,  vegetables, whole grains, beans, peas, and many nuts.  Carb counting  Keep track of the amount of carbs you eat. This can help you keep the right balance of carbs, physical activity, and medicine. The amount of carbs you need will be different from what other people need. How much you need depends on many things. These include your health, the medicines you take, and how active you are. Your healthcare team will help you figure out the right amount of carbs for you. You may start with 45 to 60 grams of carbs per meal, depending on your case. Carb counting is a system that helps you keep track of the carbohydrates you eat at each meal.  Carbs come from a variety of foods. These include grains, starchy vegetables, fruit, milk, beans, and snack foods. You can either count carbohydrate grams or carbohydrate servings. When you count carbohydrate servings, 1 carbohydrate serving = 15 grams of carbohydrates.  Here are some examples of foods that have about 15 grams of carbohydrates (1 serving of carbohydrates):  · 1/2 cup of canned or frozen fruit  · A small piece of fresh fruit (4 ounces)  · 1 slice of bread  · 1/2 cup of oatmeal  · 1/3 cup of rice  · 4 to 6 crackers  · 1/2 English muffin  · 1/2 cup of black beans  · 1/4 of a large baked potato (3 ounces)  · 2/3 cup of plain fat-free yogurt  · 1 cup of soup  · 1/2 cup of casserole  · 6 chicken nuggets  · 2-inch-square brownie or cake without frosting  · 2 small cookies  · 1/2 cup of ice cream or sherbet  Carb counting is easier when food labels are available. Look at the label to see how many grams of total carbohydrates per serving the food contains. Then you can figure out how much you should eat. If your food doesn't have a nutrition label, you should be able to get an idea how many carbs there are per serving by using a book or website.  Two very important lines to look at on the label are the serving size and the total carbohydrate amount per serving. Here are some  tips for using food labels to count your carbs:  · Check the serving size. The information on the label is based on that serving size. If you eat more than the listed serving size, you may have to double or triple the other information on the label.   · Check the total grams of carbohydrates. Total carbohydrate from the label includes sugar, starch, and fiber. Be sure to use the total carbohydrate number and not sugar alone.  · Know how many grams of carbs you can have.  Be familiar with the matching portion sizes.  · Compare labels. Compare the labels of different products. Look at serving sizes and total carbs to find the products that work best for you.   · Don't forget protein and fat. With the focus on carb counting, it might be easy to forget protein and fat in your meals. Don't forget to include sources of protein and healthy fat to balance your meals. Also watch how much salt (sodium) you eat. This is especially true if you have high blood pressure. If you have diabetes, limit the amount of sodium to less than 2,300 mg a day.  It’s also important to be consistent with the amount of carbs and time you eat when taking a fixed dose of diabetes medicine. Work with your healthcare provider or dietitian if you need more help. He or she can help you keep track of your carbs. He or she can also help you figure out how many grams of carbs you should have.  Jabari last reviewed this educational content on 9/1/2018 © 2000-2020 [employer]. [address] [place] [address], [place] [address]. All rights reserved. This information is not intended as a substitute for professional medical care. Always follow your healthcare professional's instructions.            decreased ability to use legs for bridging/pushing/decreased ability to use arms for pushing/pulling

## 2022-07-11 NOTE — PROGRESS NOTE ADULT - PROBLEM SELECTOR PLAN 6
Pt stated she wants help for ETOH.  Pt last drank a couple of hours ago, pt stated she drank a lot.  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  Pt stated she has been drinking about once a week since then.  Pt very teraful during triage.   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

## 2022-08-16 NOTE — PROGRESS NOTE ADULT - PROBLEM SELECTOR PROBLEM 2
Elevated liver enzymes Methotrexate Pregnancy And Lactation Text: This medication is Pregnancy Category X and is known to cause fetal harm. This medication is excreted in breast milk.

## 2022-09-23 NOTE — DISCHARGE NOTE ADULT - NS DC ANGIO PCI YN
9/22/2022  ICookie MD, saw and evaluated the patient  I have discussed the patient with the resident/non-physician practitioner and agree with the resident's/non-physician practitioner's findings, Plan of Care, and MDM as documented in the resident's/non-physician practitioner's note, except where noted  All available labs and Radiology studies were reviewed  I was present for key portions of any procedure(s) performed by the resident/non-physician practitioner and I was immediately available to provide assistance  At this point I agree with the current assessment done in the Emergency Department  I have conducted an independent evaluation of this patient a history and physical is as follows:  Pt caught l leg with  blade  Pt sustained lac to L inferior thigh PE: good nv from 2 0 cm lac to anterior thigh MDM: will repair    ED Course         Critical Care Time  Procedures no

## 2022-10-25 NOTE — ED ADULT NURSE NOTE - NS ED NOTE  TALK SOMEONE YN
No Asc Procedure Text (B): After obtaining clear surgical margins the patient was sent to an ASC for surgical repair.  The patient understands they will receive post-surgical care and follow-up from the ASC physician.

## 2022-12-02 NOTE — DISCHARGE NOTE ADULT - ABILITY TO HEAR (WITH HEARING AID OR HEARING APPLIANCE IF NORMALLY USED):
Mildly to Moderately Impaired: difficulty hearing in some environments or speaker may need to increase volume or speak distinctly
70

## 2023-03-08 NOTE — PROGRESS NOTE ADULT - PROBLEM SELECTOR PLAN 8
arthritis - As per conversation with both daughters - - patient is FULL CODE  - DVT PPx: SCDs     Dispo: Febrile yesterday with workup essentially unremarkable for an infection. Increased work of breathing today, concerning of possible PE ? - - > off of AC in the setting of SDH and presentation for fall. Not a candidate for acute intervention. Will discuss with family.     Anneliese Robles  PGY1  [phone]

## 2024-09-03 NOTE — BRIEF OPERATIVE NOTE - PRE-OP DX
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
Render In Strict Bullet Format?: No
Detail Level: Zone
Continue Regimen: Terbinafine
Plan: Pt will call if no improvement

## 2025-03-28 NOTE — PROGRESS NOTE ADULT - PROBLEM SELECTOR PLAN 7
No - DVT PPx: SCDs   - Full code     Dispo:  pending volume optimization and oxygen requirements. Per PT dispo to rehab, but pt prefers home with home PT, if home, dispo with home 02 as patient desaturates to 70s on RA.    Nuzhat Avalos, PGY-1   [phone] - DVT PPx: SCDs   - Full code     Dispo:  pending volume optimization and oxygen requirements. Per PT dispo to rehab, but pt prefers home with home PT, if home, dispo with home 02 as patient desaturates to 70s on RA.    Patient desaturates to 70s on RA. Patient has acute on chronic heart failure. Patient is in a chronic stable state.    Nuzhat Avalos, PGY-1   [phone] - DVT PPx: SCDs   - Full code     Dispo:  pending volume optimization and oxygen requirements. Per PT dispo to rehab, but pt prefers home with home PT, if home, dispo with home 02 as patient desaturates to 70s on RA.    Patient desaturates to 70% on RA. Patient has acute on chronic heart failure. Patient is in a chronic stable state.    Nuzhat Avalos, PGY-1   [phone] Yes...

## 2025-05-02 NOTE — PHYSICAL THERAPY INITIAL EVALUATION ADULT - IMPAIRED TRANSFERS: SIT/STAND, REHAB EVAL
impaired postural control/impaired balance/decreased strength/narrow base of support
03-May-2025 01:09